# Patient Record
Sex: FEMALE | Race: WHITE | NOT HISPANIC OR LATINO
[De-identification: names, ages, dates, MRNs, and addresses within clinical notes are randomized per-mention and may not be internally consistent; named-entity substitution may affect disease eponyms.]

---

## 2018-06-21 ENCOUNTER — APPOINTMENT (OUTPATIENT)
Dept: GASTROENTEROLOGY | Facility: CLINIC | Age: 63
End: 2018-06-21
Payer: COMMERCIAL

## 2018-06-21 VITALS
BODY MASS INDEX: 35.56 KG/M2 | TEMPERATURE: 98 F | RESPIRATION RATE: 15 BRPM | WEIGHT: 216 LBS | HEIGHT: 65.2 IN | SYSTOLIC BLOOD PRESSURE: 120 MMHG | OXYGEN SATURATION: 98 % | DIASTOLIC BLOOD PRESSURE: 72 MMHG | HEART RATE: 85 BPM

## 2018-06-21 DIAGNOSIS — Z87.891 PERSONAL HISTORY OF NICOTINE DEPENDENCE: ICD-10-CM

## 2018-06-21 DIAGNOSIS — Z83.49 FAMILY HISTORY OF OTHER ENDOCRINE, NUTRITIONAL AND METABOLIC DISEASES: ICD-10-CM

## 2018-06-21 PROCEDURE — 99205 OFFICE O/P NEW HI 60 MIN: CPT

## 2018-06-22 ENCOUNTER — RX RENEWAL (OUTPATIENT)
Age: 63
End: 2018-06-22

## 2018-06-25 ENCOUNTER — RX RENEWAL (OUTPATIENT)
Age: 63
End: 2018-06-25

## 2018-07-03 LAB
ALBUMIN SERPL ELPH-MCNC: 3.3 G/DL
ALP BLD-CCNC: 282 U/L
ALT SERPL-CCNC: 55 U/L
ANION GAP SERPL CALC-SCNC: 15 MMOL/L
AST SERPL-CCNC: 64 U/L
BASOPHILS # BLD AUTO: 0.03 K/UL
BASOPHILS NFR BLD AUTO: 0.2 %
BILIRUB SERPL-MCNC: 1 MG/DL
BUN SERPL-MCNC: 17 MG/DL
CALCIUM SERPL-MCNC: 9.9 MG/DL
CHLORIDE SERPL-SCNC: 102 MMOL/L
CO2 SERPL-SCNC: 24 MMOL/L
CREAT SERPL-MCNC: 0.81 MG/DL
EOSINOPHIL # BLD AUTO: 0.27 K/UL
EOSINOPHIL NFR BLD AUTO: 1.9 %
HCT VFR BLD CALC: 44.8 %
HGB BLD-MCNC: 13.8 G/DL
IMM GRANULOCYTES NFR BLD AUTO: 0.3 %
INR PPP: 1.16 RATIO
LYMPHOCYTES # BLD AUTO: 4.26 K/UL
LYMPHOCYTES NFR BLD AUTO: 30 %
MAN DIFF?: NORMAL
MCHC RBC-ENTMCNC: 29.6 PG
MCHC RBC-ENTMCNC: 30.8 GM/DL
MCV RBC AUTO: 95.9 FL
MONOCYTES # BLD AUTO: 1.15 K/UL
MONOCYTES NFR BLD AUTO: 8.1 %
NEUTROPHILS # BLD AUTO: 8.46 K/UL
NEUTROPHILS NFR BLD AUTO: 59.5 %
PLATELET # BLD AUTO: 293 K/UL
POTASSIUM SERPL-SCNC: 4.9 MMOL/L
PROT SERPL-MCNC: 7.1 G/DL
PT BLD: 13.2 SEC
RBC # BLD: 4.67 M/UL
RBC # FLD: 15.8 %
SODIUM SERPL-SCNC: 141 MMOL/L
TACROLIMUS SERPL-MCNC: 9.3 NG/ML
TSH SERPL-ACNC: 0.32 UIU/ML
TTG IGA SER IA-ACNC: 6.7 UNITS
TTG IGA SER-ACNC: NEGATIVE
TTG IGG SER IA-ACNC: <5 UNITS
TTG IGG SER IA-ACNC: NEGATIVE
WBC # FLD AUTO: 14.21 K/UL

## 2018-07-27 ENCOUNTER — OUTPATIENT (OUTPATIENT)
Dept: OUTPATIENT SERVICES | Facility: HOSPITAL | Age: 63
LOS: 1 days | End: 2018-07-27
Payer: COMMERCIAL

## 2018-07-27 VITALS
HEART RATE: 101 BPM | RESPIRATION RATE: 16 BRPM | OXYGEN SATURATION: 99 % | TEMPERATURE: 99 F | SYSTOLIC BLOOD PRESSURE: 140 MMHG | WEIGHT: 214.95 LBS | HEIGHT: 62 IN | DIASTOLIC BLOOD PRESSURE: 80 MMHG

## 2018-07-27 DIAGNOSIS — G47.33 OBSTRUCTIVE SLEEP APNEA (ADULT) (PEDIATRIC): ICD-10-CM

## 2018-07-27 DIAGNOSIS — Z98.84 BARIATRIC SURGERY STATUS: Chronic | ICD-10-CM

## 2018-07-27 DIAGNOSIS — Z94.4 LIVER TRANSPLANT STATUS: Chronic | ICD-10-CM

## 2018-07-27 DIAGNOSIS — R60.9 EDEMA, UNSPECIFIED: ICD-10-CM

## 2018-07-27 DIAGNOSIS — I10 ESSENTIAL (PRIMARY) HYPERTENSION: ICD-10-CM

## 2018-07-27 DIAGNOSIS — Z95.828 PRESENCE OF OTHER VASCULAR IMPLANTS AND GRAFTS: Chronic | ICD-10-CM

## 2018-07-27 DIAGNOSIS — I48.91 UNSPECIFIED ATRIAL FIBRILLATION: ICD-10-CM

## 2018-07-27 DIAGNOSIS — K21.9 GASTRO-ESOPHAGEAL REFLUX DISEASE WITHOUT ESOPHAGITIS: ICD-10-CM

## 2018-07-27 DIAGNOSIS — Z94.4 LIVER TRANSPLANT STATUS: ICD-10-CM

## 2018-07-27 DIAGNOSIS — M11.20 OTHER CHONDROCALCINOSIS, UNSPECIFIED SITE: Chronic | ICD-10-CM

## 2018-07-27 DIAGNOSIS — Z98.890 OTHER SPECIFIED POSTPROCEDURAL STATES: Chronic | ICD-10-CM

## 2018-07-27 LAB
ALBUMIN SERPL ELPH-MCNC: 3.4 G/DL — SIGNIFICANT CHANGE UP (ref 3.3–5)
ALP SERPL-CCNC: 395 U/L — HIGH (ref 40–120)
ALT FLD-CCNC: 63 U/L — HIGH (ref 4–33)
AST SERPL-CCNC: 91 U/L — HIGH (ref 4–32)
BILIRUB SERPL-MCNC: 0.8 MG/DL — SIGNIFICANT CHANGE UP (ref 0.2–1.2)
BUN SERPL-MCNC: 17 MG/DL — SIGNIFICANT CHANGE UP (ref 7–23)
CALCIUM SERPL-MCNC: 9.6 MG/DL — SIGNIFICANT CHANGE UP (ref 8.4–10.5)
CHLORIDE SERPL-SCNC: 100 MMOL/L — SIGNIFICANT CHANGE UP (ref 98–107)
CO2 SERPL-SCNC: 24 MMOL/L — SIGNIFICANT CHANGE UP (ref 22–31)
CREAT SERPL-MCNC: 0.89 MG/DL — SIGNIFICANT CHANGE UP (ref 0.5–1.3)
GLUCOSE SERPL-MCNC: 111 MG/DL — HIGH (ref 70–99)
HCG SERPL-ACNC: 16.27 MIU/ML — SIGNIFICANT CHANGE UP
HCT VFR BLD CALC: 44 % — SIGNIFICANT CHANGE UP (ref 34.5–45)
HGB BLD-MCNC: 14.2 G/DL — SIGNIFICANT CHANGE UP (ref 11.5–15.5)
MCHC RBC-ENTMCNC: 30.3 PG — SIGNIFICANT CHANGE UP (ref 27–34)
MCHC RBC-ENTMCNC: 32.3 % — SIGNIFICANT CHANGE UP (ref 32–36)
MCV RBC AUTO: 93.8 FL — SIGNIFICANT CHANGE UP (ref 80–100)
NRBC # FLD: 0 — SIGNIFICANT CHANGE UP
PLATELET # BLD AUTO: 297 K/UL — SIGNIFICANT CHANGE UP (ref 150–400)
PMV BLD: 10.6 FL — SIGNIFICANT CHANGE UP (ref 7–13)
POTASSIUM SERPL-MCNC: 3.4 MMOL/L — LOW (ref 3.5–5.3)
POTASSIUM SERPL-SCNC: 3.4 MMOL/L — LOW (ref 3.5–5.3)
PROT SERPL-MCNC: 7.3 G/DL — SIGNIFICANT CHANGE UP (ref 6–8.3)
RBC # BLD: 4.69 M/UL — SIGNIFICANT CHANGE UP (ref 3.8–5.2)
RBC # FLD: 14 % — SIGNIFICANT CHANGE UP (ref 10.3–14.5)
SODIUM SERPL-SCNC: 138 MMOL/L — SIGNIFICANT CHANGE UP (ref 135–145)
WBC # BLD: 14.34 K/UL — HIGH (ref 3.8–10.5)
WBC # FLD AUTO: 14.34 K/UL — HIGH (ref 3.8–10.5)

## 2018-07-27 PROCEDURE — 93010 ELECTROCARDIOGRAM REPORT: CPT

## 2018-07-27 NOTE — H&P PST ADULT - NEGATIVE GASTROINTESTINAL SYMPTOMS
no change in bowel habits/no hematochezia/no hiccoughs/no melena/no jaundice/no nausea/no steatorrhea/no diarrhea/no flatulence/no vomiting/no constipation/no abdominal pain

## 2018-07-27 NOTE — H&P PST ADULT - FEMALE-SPECIFIC SYMPTOMS
irregular menses/LMP 1 month ago spotting/LMP 1 month ago, spotting between period stated/irregular menses

## 2018-07-27 NOTE — H&P PST ADULT - NEGATIVE FEMALE-SPECIFIC SYMPTOMS
no vaginal discharge/no menorrhagia/no abnormal vaginal bleeding/no spotting no vaginal discharge/no menorrhagia/no abnormal vaginal bleeding

## 2018-07-27 NOTE — H&P PST ADULT - NSANTHOSAYNRD_GEN_A_CORE
never tested/No. HA screening performed.  STOP BANG Legend: 0-2 = LOW Risk; 3-4 = INTERMEDIATE Risk; 5-8 = HIGH Risk

## 2018-07-27 NOTE — H&P PST ADULT - PROBLEM SELECTOR PLAN 1
Pt is scheduled for a colonoscopy, endoscopy anesthesia on 8/3/18. Preop instructions provided including NPO status, no NSAIDs or otc meds, continue ppi for gi prophylaxis. verbalized understanding.

## 2018-07-27 NOTE — H&P PST ADULT - NEGATIVE CARDIOVASCULAR SYMPTOMS
no palpitations/no dyspnea on exertion/no orthopnea/no chest pain/no claudication/no paroxysmal nocturnal dyspnea

## 2018-07-27 NOTE — H&P PST ADULT - NEGATIVE PSYCHIATRIC SYMPTOMS
no paranoia/no auditory hallucinations/no memory loss/no agitation/no visual hallucinations/no hyperactivity/no meds, sad about divorce./no mood swings/no suicidal ideation

## 2018-07-27 NOTE — H&P PST ADULT - PSH
[place] filter in place    H/O laparoscopic adjustable gastric banding  10 years ago  H/O prior ablation treatment  1/2018  Pseudogout  left shoulder and elbow  S/P liver transplant  9/2007

## 2018-07-27 NOTE — H&P PST ADULT - PMH
Afib    Autoimmune hepatitis    DVT (deep venous thrombosis)  LLE  Edema    GERD (gastroesophageal reflux disease)    HLD (hyperlipidemia)    HTN (hypertension)    Liver disease    Skin callus  SCC

## 2018-07-27 NOTE — H&P PST ADULT - PROBLEM SELECTOR PLAN 2
Pt on Eliquis , EP specialist Dr Byrd instructed to stop on 7/31/18, document in chart.  Dr Draper stated no other evaluation necessary.

## 2018-07-27 NOTE — H&P PST ADULT - NEGATIVE NEUROLOGICAL SYMPTOMS
no paresthesias/no syncope/no headache/no transient paralysis/no tremors/no vertigo/no loss of sensation/no facial palsy/no generalized seizures/no weakness/no confusion/no focal seizures/no loss of consciousness/no hemiparesis

## 2018-07-27 NOTE — H&P PST ADULT - NEGATIVE ALLERGY TYPES
no outdoor environmental allergies/no indoor environmental allergies/no reactions to animals/no reactions to insect bites/no reactions to medicines/no reactions to food

## 2018-07-27 NOTE — H&P PST ADULT - NEGATIVE GENERAL GENITOURINARY SYMPTOMS
no hematuria/no flank pain R/no bladder infections/normal libido/no flank pain L/no urine discoloration/no incontinence/no nocturia/no renal colic/no dysuria/no urinary hesitancy/no gas in urine

## 2018-07-27 NOTE — H&P PST ADULT - RS GEN PE MLT RESP DETAILS PC
no rhonchi/no wheezes/clear to auscultation bilaterally/no rales/airway patent/breath sounds equal/respirations non-labored/no chest wall tenderness/no subcutaneous emphysema/good air movement/no intercostal retractions

## 2018-07-27 NOTE — H&P PST ADULT - NEGATIVE BREAST SYMPTOMS
no nipple discharge L/no breast lump L/no breast tenderness L/no breast lump R/no nipple discharge R

## 2018-07-27 NOTE — H&P PST ADULT - HEALTH CARE MAINTENANCE
colonoscopy many years ago   sees ep specialist: every 3-6 months  sees pcp q 6 months and PE q year

## 2018-07-27 NOTE — H&P PST ADULT - NEGATIVE MUSCULOSKELETAL SYMPTOMS
no muscle cramps/no muscle weakness/no neck pain/no leg pain L/no joint swelling/no myalgia/no stiffness/no arm pain L/no arm pain R/no leg pain R

## 2018-07-27 NOTE — H&P PST ADULT - HISTORY OF PRESENT ILLNESS
64y/o female w/ a pmh of Afib htn, hld, liver disease S/P Liver transplant , GERD and edema. Presents to PST with a preop dx of liver transplant status ad to be evaluated for a scheduled colonoscopy endoscopy anesthesia on 8/3/18. Pt states planned procedures to be routine since liver transplant, will also check bands and if needed to be re adjusted will be perform. Denies any complaints at this time.

## 2018-07-27 NOTE — H&P PST ADULT - NEGATIVE GENERAL SYMPTOMS
no anorexia/no malaise/no weight loss/no polyphagia/no fever/no fatigue/no chills/no weight gain/no polyuria/no polydipsia

## 2018-08-01 DIAGNOSIS — Z94.4 LIVER TRANSPLANT STATUS: ICD-10-CM

## 2018-08-03 ENCOUNTER — APPOINTMENT (OUTPATIENT)
Dept: GASTROENTEROLOGY | Facility: HOSPITAL | Age: 63
End: 2018-08-03
Payer: COMMERCIAL

## 2018-08-03 ENCOUNTER — RESULT REVIEW (OUTPATIENT)
Age: 63
End: 2018-08-03

## 2018-08-03 ENCOUNTER — OUTPATIENT (OUTPATIENT)
Dept: OUTPATIENT SERVICES | Facility: HOSPITAL | Age: 63
LOS: 1 days | Discharge: HOME | End: 2018-08-03
Payer: COMMERCIAL

## 2018-08-03 DIAGNOSIS — Z95.828 PRESENCE OF OTHER VASCULAR IMPLANTS AND GRAFTS: Chronic | ICD-10-CM

## 2018-08-03 DIAGNOSIS — Z94.4 LIVER TRANSPLANT STATUS: ICD-10-CM

## 2018-08-03 DIAGNOSIS — M11.20 OTHER CHONDROCALCINOSIS, UNSPECIFIED SITE: Chronic | ICD-10-CM

## 2018-08-03 DIAGNOSIS — Z98.84 BARIATRIC SURGERY STATUS: Chronic | ICD-10-CM

## 2018-08-03 DIAGNOSIS — Z98.890 OTHER SPECIFIED POSTPROCEDURAL STATES: Chronic | ICD-10-CM

## 2018-08-03 DIAGNOSIS — Z94.4 LIVER TRANSPLANT STATUS: Chronic | ICD-10-CM

## 2018-08-03 PROCEDURE — 88305 TISSUE EXAM BY PATHOLOGIST: CPT | Mod: 26

## 2018-08-03 PROCEDURE — 45380 COLONOSCOPY AND BIOPSY: CPT | Mod: 59

## 2018-08-03 PROCEDURE — 45385 COLONOSCOPY W/LESION REMOVAL: CPT

## 2018-08-03 PROCEDURE — 43251 EGD REMOVE LESION SNARE: CPT

## 2018-08-09 LAB — SURGICAL PATHOLOGY STUDY: SIGNIFICANT CHANGE UP

## 2018-08-30 ENCOUNTER — CLINICAL ADVICE (OUTPATIENT)
Age: 63
End: 2018-08-30

## 2018-09-10 PROBLEM — I10 ESSENTIAL (PRIMARY) HYPERTENSION: Chronic | Status: ACTIVE | Noted: 2018-07-27

## 2018-09-10 PROBLEM — I82.409 ACUTE EMBOLISM AND THROMBOSIS OF UNSPECIFIED DEEP VEINS OF UNSPECIFIED LOWER EXTREMITY: Chronic | Status: ACTIVE | Noted: 2018-07-27

## 2018-09-10 PROBLEM — I48.91 UNSPECIFIED ATRIAL FIBRILLATION: Chronic | Status: ACTIVE | Noted: 2018-07-27

## 2018-09-20 ENCOUNTER — CLINICAL ADVICE (OUTPATIENT)
Age: 63
End: 2018-09-20

## 2018-09-27 ENCOUNTER — APPOINTMENT (OUTPATIENT)
Dept: HEPATOLOGY | Facility: CLINIC | Age: 63
End: 2018-09-27

## 2018-10-04 ENCOUNTER — APPOINTMENT (OUTPATIENT)
Dept: HEPATOLOGY | Facility: CLINIC | Age: 63
End: 2018-10-04
Payer: COMMERCIAL

## 2018-10-04 ENCOUNTER — LABORATORY RESULT (OUTPATIENT)
Age: 63
End: 2018-10-04

## 2018-10-04 VITALS
WEIGHT: 222.38 LBS | BODY MASS INDEX: 36.78 KG/M2 | RESPIRATION RATE: 16 BRPM | SYSTOLIC BLOOD PRESSURE: 120 MMHG | HEART RATE: 145 BPM | DIASTOLIC BLOOD PRESSURE: 84 MMHG | TEMPERATURE: 98.1 F

## 2018-10-04 DIAGNOSIS — Z87.19 PERSONAL HISTORY OF OTHER DISEASES OF THE DIGESTIVE SYSTEM: ICD-10-CM

## 2018-10-04 PROCEDURE — 99204 OFFICE O/P NEW MOD 45 MIN: CPT

## 2018-10-04 PROCEDURE — 99215 OFFICE O/P EST HI 40 MIN: CPT

## 2018-10-04 RX ORDER — OXYCODONE 10 MG/1
10 TABLET ORAL
Qty: 30 | Refills: 0 | Status: DISCONTINUED | COMMUNITY
Start: 2018-03-16 | End: 2018-10-04

## 2018-10-04 RX ORDER — COLCHICINE 0.6 MG/1
0.6 TABLET ORAL
Qty: 30 | Refills: 0 | Status: DISCONTINUED | COMMUNITY
Start: 2018-01-17 | End: 2018-10-04

## 2018-10-04 RX ORDER — SODIUM SULFATE, POTASSIUM SULFATE, MAGNESIUM SULFATE 17.5; 3.13; 1.6 G/ML; G/ML; G/ML
17.5-3.13-1.6 SOLUTION, CONCENTRATE ORAL
Qty: 1 | Refills: 0 | Status: DISCONTINUED | COMMUNITY
Start: 2018-06-21 | End: 2018-10-04

## 2018-10-04 RX ORDER — METOPROLOL SUCCINATE 100 MG/1
100 TABLET, EXTENDED RELEASE ORAL
Qty: 60 | Refills: 0 | Status: DISCONTINUED | COMMUNITY
Start: 2018-02-11 | End: 2018-10-04

## 2018-10-04 RX ORDER — CEPHALEXIN 500 MG/1
500 CAPSULE ORAL
Qty: 14 | Refills: 0 | Status: DISCONTINUED | COMMUNITY
Start: 2018-03-23 | End: 2018-10-04

## 2018-10-04 RX ORDER — ONDANSETRON 4 MG/1
4 TABLET, ORALLY DISINTEGRATING ORAL
Qty: 15 | Refills: 0 | Status: DISCONTINUED | COMMUNITY
Start: 2018-03-23 | End: 2018-10-04

## 2018-10-04 RX ORDER — OXYCODONE 5 MG/1
5 TABLET ORAL
Qty: 12 | Refills: 0 | Status: DISCONTINUED | COMMUNITY
Start: 2018-03-23 | End: 2018-10-04

## 2018-10-04 RX ORDER — ENOXAPARIN SODIUM 150 MG/ML
150 INJECTION SUBCUTANEOUS
Qty: 1 | Refills: 0 | Status: DISCONTINUED | COMMUNITY
Start: 2018-01-12 | End: 2018-10-04

## 2018-10-04 RX ORDER — BACLOFEN 20 MG/1
20 TABLET ORAL
Qty: 30 | Refills: 0 | Status: DISCONTINUED | COMMUNITY
Start: 2018-03-16 | End: 2018-10-04

## 2018-10-05 LAB
AFP-TM SERPL-MCNC: 2.7 NG/ML
ALBUMIN SERPL ELPH-MCNC: 3.4 G/DL
ALP BLD-CCNC: 378 U/L
ALT SERPL-CCNC: 78 U/L
ANION GAP SERPL CALC-SCNC: 13 MMOL/L
AST SERPL-CCNC: 82 U/L
BASOPHILS # BLD AUTO: 0.03 K/UL
BASOPHILS NFR BLD AUTO: 0.2 %
BILIRUB SERPL-MCNC: 0.9 MG/DL
BUN SERPL-MCNC: 16 MG/DL
CALCIUM SERPL-MCNC: 9.7 MG/DL
CHLORIDE SERPL-SCNC: 101 MMOL/L
CO2 SERPL-SCNC: 25 MMOL/L
CREAT SERPL-MCNC: 0.71 MG/DL
EBV DNA SERPL NAA+PROBE-ACNC: NOT DETECTED IU/ML
EBVPCR LOG: NOT DETECTED LOGIU/ML
EOSINOPHIL # BLD AUTO: 0.12 K/UL
EOSINOPHIL NFR BLD AUTO: 0.9 %
FERRITIN SERPL-MCNC: 145 NG/ML
GLUCOSE SERPL-MCNC: 131 MG/DL
HBV CORE IGG+IGM SER QL: NONREACTIVE
HBV SURFACE AB SER QL: NONREACTIVE
HBV SURFACE AG SER QL: NONREACTIVE
HCT VFR BLD CALC: 46.1 %
HCV AB SER QL: NONREACTIVE
HCV S/CO RATIO: 0.58 S/CO
HGB BLD-MCNC: 14.2 G/DL
IMM GRANULOCYTES NFR BLD AUTO: 0.2 %
IRON SATN MFR SERPL: 30 %
IRON SERPL-MCNC: 82 UG/DL
LYMPHOCYTES # BLD AUTO: 3.34 K/UL
LYMPHOCYTES NFR BLD AUTO: 24.5 %
MAN DIFF?: NORMAL
MCHC RBC-ENTMCNC: 29.6 PG
MCHC RBC-ENTMCNC: 30.8 GM/DL
MCV RBC AUTO: 96.2 FL
MONOCYTES # BLD AUTO: 0.89 K/UL
MONOCYTES NFR BLD AUTO: 6.5 %
MPO AB + PR3 PNL SER: NORMAL
NEUTROPHILS # BLD AUTO: 9.23 K/UL
NEUTROPHILS NFR BLD AUTO: 67.7 %
PLATELET # BLD AUTO: 315 K/UL
POTASSIUM SERPL-SCNC: 3.7 MMOL/L
PROT SERPL-MCNC: 7.2 G/DL
RBC # BLD: 4.79 M/UL
RBC # FLD: 14.7 %
SODIUM SERPL-SCNC: 139 MMOL/L
TACROLIMUS SERPL-MCNC: <2 NG/ML
TIBC SERPL-MCNC: 277 UG/DL
TTG IGA SER IA-ACNC: 6.1 UNITS
TTG IGA SER-ACNC: NEGATIVE
UIBC SERPL-MCNC: 195 UG/DL
WBC # FLD AUTO: 13.64 K/UL

## 2018-10-08 ENCOUNTER — RESULT REVIEW (OUTPATIENT)
Age: 63
End: 2018-10-08

## 2018-10-08 ENCOUNTER — MOBILE ON CALL (OUTPATIENT)
Age: 63
End: 2018-10-08

## 2018-10-09 LAB
A1AT SERPL-MCNC: 178 MG/DL
CMV DNA SPEC QL NAA+PROBE: NOT DETECTED IU/ML
CMVPCR LOG: NOT DETECTED LOGIU/ML
DEPRECATED KAPPA LC FREE/LAMBDA SER: 1.46 RATIO
HBV DNA # SERPL NAA+PROBE: NOT DETECTED
HEPB DNA PCR LOG: NOT DETECTED LOGIU/ML
HERPES SIMPLEX 1 DNA: NOT DETECTED
HERPES SIMPLEX 2 DNA: NOT DETECTED
HERPES SIMPLEX SPECIMEN SOURCE: NORMAL
IGA SER QL IEP: 342 MG/DL
IGG SER QL IEP: 1770 MG/DL
IGM SER QL IEP: 94 MG/DL
KAPPA LC CSF-MCNC: 3.36 MG/DL
KAPPA LC SERPL-MCNC: 4.9 MG/DL
LKM AB SER QL IF: 1.5 UNITS
MITOCHONDRIA AB SER IF-ACNC: NORMAL
SMOOTH MUSCLE AB SER QL IF: NORMAL

## 2018-10-10 LAB — HEV AB SER QL: POSITIVE

## 2018-10-11 LAB — HEPATITIS E IGM ABY: NORMAL

## 2018-10-12 LAB
ANA PAT FLD IF-IMP: ABNORMAL
ANA SER IF-ACNC: ABNORMAL
SOLUBLE LIVER IGG SER IA-ACNC: < 20.1 UNITS

## 2018-10-15 ENCOUNTER — FORM ENCOUNTER (OUTPATIENT)
Age: 63
End: 2018-10-15

## 2018-10-15 ENCOUNTER — OTHER (OUTPATIENT)
Age: 63
End: 2018-10-15

## 2018-10-16 ENCOUNTER — OUTPATIENT (OUTPATIENT)
Dept: OUTPATIENT SERVICES | Facility: HOSPITAL | Age: 63
LOS: 1 days | End: 2018-10-16
Payer: COMMERCIAL

## 2018-10-16 ENCOUNTER — APPOINTMENT (OUTPATIENT)
Dept: ULTRASOUND IMAGING | Facility: CLINIC | Age: 63
End: 2018-10-16
Payer: COMMERCIAL

## 2018-10-16 DIAGNOSIS — Z95.828 PRESENCE OF OTHER VASCULAR IMPLANTS AND GRAFTS: Chronic | ICD-10-CM

## 2018-10-16 DIAGNOSIS — Z98.84 BARIATRIC SURGERY STATUS: Chronic | ICD-10-CM

## 2018-10-16 DIAGNOSIS — Z94.4 LIVER TRANSPLANT STATUS: Chronic | ICD-10-CM

## 2018-10-16 DIAGNOSIS — Z00.8 ENCOUNTER FOR OTHER GENERAL EXAMINATION: ICD-10-CM

## 2018-10-16 DIAGNOSIS — M11.20 OTHER CHONDROCALCINOSIS, UNSPECIFIED SITE: Chronic | ICD-10-CM

## 2018-10-16 DIAGNOSIS — Z98.890 OTHER SPECIFIED POSTPROCEDURAL STATES: Chronic | ICD-10-CM

## 2018-10-16 PROCEDURE — 93975 VASCULAR STUDY: CPT | Mod: 26

## 2018-10-16 PROCEDURE — 93975 VASCULAR STUDY: CPT

## 2018-11-02 LAB
APTT BLD: 31.2 SEC
INR PPP: 1.22 RATIO
PT BLD: 13.8 SEC

## 2018-11-06 ENCOUNTER — APPOINTMENT (OUTPATIENT)
Dept: ULTRASOUND IMAGING | Facility: HOSPITAL | Age: 63
End: 2018-11-06
Payer: COMMERCIAL

## 2018-11-06 ENCOUNTER — OUTPATIENT (OUTPATIENT)
Dept: OUTPATIENT SERVICES | Facility: HOSPITAL | Age: 63
LOS: 1 days | End: 2018-11-06
Payer: COMMERCIAL

## 2018-11-06 ENCOUNTER — RESULT REVIEW (OUTPATIENT)
Age: 63
End: 2018-11-06

## 2018-11-06 DIAGNOSIS — Z95.828 PRESENCE OF OTHER VASCULAR IMPLANTS AND GRAFTS: Chronic | ICD-10-CM

## 2018-11-06 DIAGNOSIS — M11.20 OTHER CHONDROCALCINOSIS, UNSPECIFIED SITE: Chronic | ICD-10-CM

## 2018-11-06 DIAGNOSIS — Z98.84 BARIATRIC SURGERY STATUS: Chronic | ICD-10-CM

## 2018-11-06 DIAGNOSIS — Z94.4 LIVER TRANSPLANT STATUS: Chronic | ICD-10-CM

## 2018-11-06 DIAGNOSIS — R94.5 ABNORMAL RESULTS OF LIVER FUNCTION STUDIES: ICD-10-CM

## 2018-11-06 DIAGNOSIS — Z98.890 OTHER SPECIFIED POSTPROCEDURAL STATES: Chronic | ICD-10-CM

## 2018-11-06 PROCEDURE — 47000 NEEDLE BIOPSY OF LIVER PERQ: CPT

## 2018-11-06 PROCEDURE — 76942 ECHO GUIDE FOR BIOPSY: CPT

## 2018-11-06 PROCEDURE — 76942 ECHO GUIDE FOR BIOPSY: CPT | Mod: 26

## 2018-11-08 ENCOUNTER — MEDICATION RENEWAL (OUTPATIENT)
Age: 63
End: 2018-11-08

## 2018-11-08 RX ORDER — TACROLIMUS 1 MG/1
1 CAPSULE ORAL
Qty: 180 | Refills: 2 | Status: DISCONTINUED | COMMUNITY
Start: 2018-11-07 | End: 2018-11-08

## 2018-11-08 RX ORDER — TACROLIMUS 5 MG/1
5 CAPSULE ORAL TWICE DAILY
Qty: 60 | Refills: 0 | Status: DISCONTINUED | COMMUNITY
Start: 2018-02-07 | End: 2018-11-08

## 2018-11-08 RX ORDER — TACROLIMUS 5 MG/1
5 CAPSULE ORAL
Qty: 180 | Refills: 2 | Status: DISCONTINUED | COMMUNITY
Start: 2018-11-07 | End: 2018-11-08

## 2018-11-16 ENCOUNTER — APPOINTMENT (OUTPATIENT)
Dept: HEPATOLOGY | Facility: CLINIC | Age: 63
End: 2018-11-16
Payer: COMMERCIAL

## 2018-11-16 VITALS
RESPIRATION RATE: 14 BRPM | WEIGHT: 229 LBS | TEMPERATURE: 97.8 F | DIASTOLIC BLOOD PRESSURE: 94 MMHG | BODY MASS INDEX: 38.15 KG/M2 | HEIGHT: 65 IN | HEART RATE: 59 BPM | SYSTOLIC BLOOD PRESSURE: 148 MMHG

## 2018-11-16 DIAGNOSIS — Z85.828 PERSONAL HISTORY OF OTHER MALIGNANT NEOPLASM OF SKIN: ICD-10-CM

## 2018-11-16 DIAGNOSIS — M11.20 OTHER CHONDROCALCINOSIS, UNSPECIFIED SITE: ICD-10-CM

## 2018-11-16 PROCEDURE — 99214 OFFICE O/P EST MOD 30 MIN: CPT

## 2018-11-19 LAB
ALBUMIN SERPL ELPH-MCNC: 3.7 G/DL
ALP BLD-CCNC: 271 U/L
ALT SERPL-CCNC: 32 U/L
ANION GAP SERPL CALC-SCNC: 13 MMOL/L
AST SERPL-CCNC: 37 U/L
BASOPHILS # BLD AUTO: 0.02 K/UL
BASOPHILS NFR BLD AUTO: 0.1 %
BILIRUB SERPL-MCNC: 0.9 MG/DL
BUN SERPL-MCNC: 14 MG/DL
CALCIUM SERPL-MCNC: 9.7 MG/DL
CHLORIDE SERPL-SCNC: 103 MMOL/L
CO2 SERPL-SCNC: 24 MMOL/L
CREAT SERPL-MCNC: 0.63 MG/DL
EOSINOPHIL # BLD AUTO: 0.17 K/UL
EOSINOPHIL NFR BLD AUTO: 1.2 %
GGT SERPL-CCNC: 315 U/L
GLUCOSE SERPL-MCNC: 90 MG/DL
HCT VFR BLD CALC: 44.5 %
HGB BLD-MCNC: 14 G/DL
IGG SER QL IEP: 1583 MG/DL
IMM GRANULOCYTES NFR BLD AUTO: 0.4 %
LYMPHOCYTES # BLD AUTO: 3.82 K/UL
LYMPHOCYTES NFR BLD AUTO: 27.4 %
MAN DIFF?: NORMAL
MCHC RBC-ENTMCNC: 30.2 PG
MCHC RBC-ENTMCNC: 31.5 GM/DL
MCV RBC AUTO: 96.1 FL
MONOCYTES # BLD AUTO: 1.16 K/UL
MONOCYTES NFR BLD AUTO: 8.3 %
NEUTROPHILS # BLD AUTO: 8.72 K/UL
NEUTROPHILS NFR BLD AUTO: 62.6 %
PLATELET # BLD AUTO: 314 K/UL
POTASSIUM SERPL-SCNC: 4.2 MMOL/L
PROT SERPL-MCNC: 7.2 G/DL
RBC # BLD: 4.63 M/UL
RBC # FLD: 14.4 %
SODIUM SERPL-SCNC: 140 MMOL/L
TACROLIMUS SERPL-MCNC: 14.8 NG/ML
WBC # FLD AUTO: 13.94 K/UL

## 2018-11-19 RX ORDER — TACROLIMUS 1 MG/1
1 CAPSULE ORAL TWICE DAILY
Qty: 180 | Refills: 1 | Status: DISCONTINUED | COMMUNITY
Start: 2018-11-08 | End: 2018-11-19

## 2018-11-27 ENCOUNTER — CLINICAL ADVICE (OUTPATIENT)
Age: 63
End: 2018-11-27

## 2018-12-03 ENCOUNTER — CLINICAL ADVICE (OUTPATIENT)
Age: 63
End: 2018-12-03

## 2018-12-03 ENCOUNTER — RX RENEWAL (OUTPATIENT)
Age: 63
End: 2018-12-03

## 2018-12-06 LAB
ALBUMIN SERPL ELPH-MCNC: 3.7 G/DL
ALP BLD-CCNC: 335 U/L
ALT SERPL-CCNC: 42 U/L
ANION GAP SERPL CALC-SCNC: 11 MMOL/L
AST SERPL-CCNC: 47 U/L
BASOPHILS # BLD AUTO: 0.03 K/UL
BASOPHILS NFR BLD AUTO: 0.2 %
BILIRUB SERPL-MCNC: 1.2 MG/DL
BUN SERPL-MCNC: 11 MG/DL
CALCIUM SERPL-MCNC: 9.3 MG/DL
CHLORIDE SERPL-SCNC: 103 MMOL/L
CO2 SERPL-SCNC: 24 MMOL/L
CREAT SERPL-MCNC: 0.61 MG/DL
EOSINOPHIL # BLD AUTO: 0.14 K/UL
EOSINOPHIL NFR BLD AUTO: 0.8 %
GGT SERPL-CCNC: 339 U/L
GLUCOSE SERPL-MCNC: 101 MG/DL
HCT VFR BLD CALC: 45.2 %
HGB BLD-MCNC: 14.2 G/DL
IMM GRANULOCYTES NFR BLD AUTO: 0.4 %
LYMPHOCYTES # BLD AUTO: 4.54 K/UL
LYMPHOCYTES NFR BLD AUTO: 25.4 %
MAN DIFF?: NORMAL
MCHC RBC-ENTMCNC: 29.6 PG
MCHC RBC-ENTMCNC: 31.4 GM/DL
MCV RBC AUTO: 94.2 FL
MONOCYTES # BLD AUTO: 1.69 K/UL
MONOCYTES NFR BLD AUTO: 9.5 %
NEUTROPHILS # BLD AUTO: 11.41 K/UL
NEUTROPHILS NFR BLD AUTO: 63.7 %
PLATELET # BLD AUTO: 318 K/UL
POTASSIUM SERPL-SCNC: 4.4 MMOL/L
PROT SERPL-MCNC: 6.8 G/DL
RBC # BLD: 4.8 M/UL
RBC # FLD: 14.1 %
SODIUM SERPL-SCNC: 138 MMOL/L
TACROLIMUS SERPL-MCNC: 11.7 NG/ML
WBC # FLD AUTO: 17.88 K/UL

## 2018-12-07 ENCOUNTER — APPOINTMENT (OUTPATIENT)
Dept: GASTROENTEROLOGY | Facility: HOSPITAL | Age: 63
End: 2018-12-07

## 2018-12-07 ENCOUNTER — OUTPATIENT (OUTPATIENT)
Dept: OUTPATIENT SERVICES | Facility: HOSPITAL | Age: 63
LOS: 1 days | Discharge: HOME | End: 2018-12-07
Payer: COMMERCIAL

## 2018-12-07 ENCOUNTER — RESULT REVIEW (OUTPATIENT)
Age: 63
End: 2018-12-07

## 2018-12-07 DIAGNOSIS — M11.20 OTHER CHONDROCALCINOSIS, UNSPECIFIED SITE: Chronic | ICD-10-CM

## 2018-12-07 DIAGNOSIS — K31.7 POLYP OF STOMACH AND DUODENUM: ICD-10-CM

## 2018-12-07 DIAGNOSIS — Z94.4 LIVER TRANSPLANT STATUS: Chronic | ICD-10-CM

## 2018-12-07 DIAGNOSIS — Z98.890 OTHER SPECIFIED POSTPROCEDURAL STATES: Chronic | ICD-10-CM

## 2018-12-07 DIAGNOSIS — Z98.84 BARIATRIC SURGERY STATUS: Chronic | ICD-10-CM

## 2018-12-07 DIAGNOSIS — Z95.828 PRESENCE OF OTHER VASCULAR IMPLANTS AND GRAFTS: Chronic | ICD-10-CM

## 2018-12-07 LAB
APPEARANCE UR: SIGNIFICANT CHANGE UP
BACTERIA # UR AUTO: HIGH
BASOPHILS # BLD AUTO: 0.03 K/UL — SIGNIFICANT CHANGE UP (ref 0–0.2)
BASOPHILS NFR BLD AUTO: 0.2 % — SIGNIFICANT CHANGE UP (ref 0–2)
BILIRUB UR-MCNC: SIGNIFICANT CHANGE UP
BLOOD UR QL VISUAL: SIGNIFICANT CHANGE UP
COD CRY URNS QL: SIGNIFICANT CHANGE UP (ref 0–0)
COLOR SPEC: YELLOW — SIGNIFICANT CHANGE UP
EOSINOPHIL # BLD AUTO: 0.07 K/UL — SIGNIFICANT CHANGE UP (ref 0–0.5)
EOSINOPHIL NFR BLD AUTO: 0.6 % — SIGNIFICANT CHANGE UP (ref 0–6)
GLUCOSE UR-MCNC: NEGATIVE — SIGNIFICANT CHANGE UP
HCT VFR BLD CALC: 45.2 % — HIGH (ref 34.5–45)
HGB BLD-MCNC: 14.5 G/DL — SIGNIFICANT CHANGE UP (ref 11.5–15.5)
IMM GRANULOCYTES # BLD AUTO: 0.05 # — SIGNIFICANT CHANGE UP
IMM GRANULOCYTES NFR BLD AUTO: 0.4 % — SIGNIFICANT CHANGE UP (ref 0–1.5)
KETONES UR-MCNC: NEGATIVE — SIGNIFICANT CHANGE UP
LEUKOCYTE ESTERASE UR-ACNC: HIGH
LYMPHOCYTES # BLD AUTO: 2.55 K/UL — SIGNIFICANT CHANGE UP (ref 1–3.3)
LYMPHOCYTES # BLD AUTO: 20 % — SIGNIFICANT CHANGE UP (ref 13–44)
MCHC RBC-ENTMCNC: 29.7 PG — SIGNIFICANT CHANGE UP (ref 27–34)
MCHC RBC-ENTMCNC: 32.1 % — SIGNIFICANT CHANGE UP (ref 32–36)
MCV RBC AUTO: 92.6 FL — SIGNIFICANT CHANGE UP (ref 80–100)
MONOCYTES # BLD AUTO: 0.82 K/UL — SIGNIFICANT CHANGE UP (ref 0–0.9)
MONOCYTES NFR BLD AUTO: 6.4 % — SIGNIFICANT CHANGE UP (ref 2–14)
MUCOUS THREADS # UR AUTO: SIGNIFICANT CHANGE UP
NEUTROPHILS # BLD AUTO: 9.2 K/UL — HIGH (ref 1.8–7.4)
NEUTROPHILS NFR BLD AUTO: 72.4 % — SIGNIFICANT CHANGE UP (ref 43–77)
NITRITE UR-MCNC: NEGATIVE — SIGNIFICANT CHANGE UP
NRBC # FLD: 0 — SIGNIFICANT CHANGE UP
PH UR: 5.5 — SIGNIFICANT CHANGE UP (ref 5–8)
PLATELET # BLD AUTO: 328 K/UL — SIGNIFICANT CHANGE UP (ref 150–400)
PMV BLD: 10.3 FL — SIGNIFICANT CHANGE UP (ref 7–13)
PROT UR-MCNC: SIGNIFICANT CHANGE UP
RBC # BLD: 4.88 M/UL — SIGNIFICANT CHANGE UP (ref 3.8–5.2)
RBC # FLD: 13.4 % — SIGNIFICANT CHANGE UP (ref 10.3–14.5)
RBC CASTS # UR COMP ASSIST: SIGNIFICANT CHANGE UP (ref 0–?)
SP GR SPEC: 1.02 — SIGNIFICANT CHANGE UP (ref 1–1.04)
SQUAMOUS # UR AUTO: SIGNIFICANT CHANGE UP
UROBILINOGEN FLD QL: NORMAL — SIGNIFICANT CHANGE UP
WBC # BLD: 12.72 K/UL — HIGH (ref 3.8–10.5)
WBC # FLD AUTO: 12.72 K/UL — HIGH (ref 3.8–10.5)
WBC UR QL: SIGNIFICANT CHANGE UP (ref 0–?)

## 2018-12-07 PROCEDURE — 43239 EGD BIOPSY SINGLE/MULTIPLE: CPT | Mod: 59,GC

## 2018-12-07 PROCEDURE — 88305 TISSUE EXAM BY PATHOLOGIST: CPT | Mod: 26

## 2018-12-07 PROCEDURE — 43251 EGD REMOVE LESION SNARE: CPT | Mod: GC

## 2018-12-08 LAB — SPECIMEN SOURCE: SIGNIFICANT CHANGE UP

## 2018-12-09 LAB
-  AMIKACIN: SIGNIFICANT CHANGE UP
-  AMPICILLIN/SULBACTAM: SIGNIFICANT CHANGE UP
-  AMPICILLIN: SIGNIFICANT CHANGE UP
-  AZTREONAM: SIGNIFICANT CHANGE UP
-  CEFAZOLIN: SIGNIFICANT CHANGE UP
-  CEFEPIME: SIGNIFICANT CHANGE UP
-  CEFOXITIN: SIGNIFICANT CHANGE UP
-  CEFTAZIDIME: SIGNIFICANT CHANGE UP
-  CEFTRIAXONE: SIGNIFICANT CHANGE UP
-  CIPROFLOXACIN: SIGNIFICANT CHANGE UP
-  ERTAPENEM: SIGNIFICANT CHANGE UP
-  GENTAMICIN: SIGNIFICANT CHANGE UP
-  IMIPENEM: SIGNIFICANT CHANGE UP
-  LEVOFLOXACIN: SIGNIFICANT CHANGE UP
-  MEROPENEM: SIGNIFICANT CHANGE UP
-  NITROFURANTOIN: SIGNIFICANT CHANGE UP
-  PIPERACILLIN/TAZOBACTAM: SIGNIFICANT CHANGE UP
-  TIGECYCLINE: SIGNIFICANT CHANGE UP
-  TOBRAMYCIN: SIGNIFICANT CHANGE UP
-  TRIMETHOPRIM/SULFAMETHOXAZOLE: SIGNIFICANT CHANGE UP
BACTERIA UR CULT: SIGNIFICANT CHANGE UP
METHOD TYPE: SIGNIFICANT CHANGE UP
ORGANISM # SPEC MICROSCOPIC CNT: SIGNIFICANT CHANGE UP
ORGANISM # SPEC MICROSCOPIC CNT: SIGNIFICANT CHANGE UP

## 2018-12-12 LAB — SURGICAL PATHOLOGY STUDY: SIGNIFICANT CHANGE UP

## 2018-12-19 ENCOUNTER — RESULT REVIEW (OUTPATIENT)
Age: 63
End: 2018-12-19

## 2018-12-24 LAB
ALBUMIN SERPL ELPH-MCNC: 3.4 G/DL
ALP BLD-CCNC: 274 U/L
ALT SERPL-CCNC: 33 U/L
ANION GAP SERPL CALC-SCNC: 9 MMOL/L
APPEARANCE: ABNORMAL
AST SERPL-CCNC: 39 U/L
BACTERIA: ABNORMAL
BASOPHILS # BLD AUTO: 0.02 K/UL
BASOPHILS NFR BLD AUTO: 0.2 %
BILIRUB SERPL-MCNC: 0.8 MG/DL
BILIRUBIN URINE: NEGATIVE
BLOOD URINE: ABNORMAL
BUN SERPL-MCNC: 15 MG/DL
CALCIUM OXALATE CRYSTALS: ABNORMAL
CALCIUM SERPL-MCNC: 9.2 MG/DL
CHLORIDE SERPL-SCNC: 105 MMOL/L
CO2 SERPL-SCNC: 28 MMOL/L
COLOR: YELLOW
CREAT SERPL-MCNC: 0.76 MG/DL
EOSINOPHIL # BLD AUTO: 0.22 K/UL
EOSINOPHIL NFR BLD AUTO: 1.7 %
GGT SERPL-CCNC: 314 U/L
GLUCOSE QUALITATIVE U: NEGATIVE MG/DL
GLUCOSE SERPL-MCNC: 97 MG/DL
HCT VFR BLD CALC: 43.9 %
HGB BLD-MCNC: 13.8 G/DL
HYALINE CASTS: 2 /LPF
IMM GRANULOCYTES NFR BLD AUTO: 0.3 %
INR PPP: 1.08 RATIO
KETONES URINE: NEGATIVE
LEUKOCYTE ESTERASE URINE: ABNORMAL
LYMPHOCYTES # BLD AUTO: 4.02 K/UL
LYMPHOCYTES NFR BLD AUTO: 31 %
MAN DIFF?: NORMAL
MCHC RBC-ENTMCNC: 29.2 PG
MCHC RBC-ENTMCNC: 31.4 GM/DL
MCV RBC AUTO: 93 FL
MICROSCOPIC-UA: NORMAL
MONOCYTES # BLD AUTO: 1 K/UL
MONOCYTES NFR BLD AUTO: 7.7 %
NEUTROPHILS # BLD AUTO: 7.68 K/UL
NEUTROPHILS NFR BLD AUTO: 59.1 %
NITRITE URINE: NEGATIVE
PH URINE: 7
PLATELET # BLD AUTO: 292 K/UL
POTASSIUM SERPL-SCNC: 4.5 MMOL/L
PROT SERPL-MCNC: 6.6 G/DL
PROTEIN URINE: NEGATIVE MG/DL
PT BLD: 12.3 SEC
RBC # BLD: 4.72 M/UL
RBC # FLD: 14.3 %
RED BLOOD CELLS URINE: 3 /HPF
SODIUM SERPL-SCNC: 142 MMOL/L
SPECIFIC GRAVITY URINE: 1.02
SQUAMOUS EPITHELIAL CELLS: 7 /HPF
TACROLIMUS SERPL-MCNC: 10.2 NG/ML
URINE COMMENTS: NORMAL
UROBILINOGEN URINE: NEGATIVE MG/DL
WBC # FLD AUTO: 12.98 K/UL
WHITE BLOOD CELLS URINE: 47 /HPF

## 2018-12-26 LAB — BACTERIA UR CULT: ABNORMAL

## 2018-12-26 RX ORDER — SULFAMETHOXAZOLE AND TRIMETHOPRIM 800; 160 MG/1; MG/1
800-160 TABLET ORAL
Qty: 14 | Refills: 0 | Status: DISCONTINUED | COMMUNITY
Start: 2018-12-24 | End: 2018-12-26

## 2019-01-18 ENCOUNTER — APPOINTMENT (OUTPATIENT)
Dept: HEPATOLOGY | Facility: CLINIC | Age: 64
End: 2019-01-18

## 2019-02-04 ENCOUNTER — RX RENEWAL (OUTPATIENT)
Age: 64
End: 2019-02-04

## 2019-02-26 LAB
ALBUMIN SERPL ELPH-MCNC: 3.5 G/DL
ALP BLD-CCNC: 272 U/L
ALT SERPL-CCNC: 33 U/L
ANION GAP SERPL CALC-SCNC: 9 MMOL/L
AST SERPL-CCNC: 32 U/L
BASOPHILS # BLD AUTO: 0.04 K/UL
BASOPHILS NFR BLD AUTO: 0.3 %
BILIRUB DIRECT SERPL-MCNC: 0.3 MG/DL
BILIRUB SERPL-MCNC: 0.8 MG/DL
BUN SERPL-MCNC: 13 MG/DL
CALCIUM SERPL-MCNC: 9.4 MG/DL
CHLORIDE SERPL-SCNC: 105 MMOL/L
CO2 SERPL-SCNC: 27 MMOL/L
CREAT SERPL-MCNC: 0.66 MG/DL
EOSINOPHIL # BLD AUTO: 0.16 K/UL
EOSINOPHIL NFR BLD AUTO: 1.1 %
GGT SERPL-CCNC: 329 U/L
GLUCOSE SERPL-MCNC: 112 MG/DL
HCT VFR BLD CALC: 43.8 %
HEPATITIS A IGG ANTIBODY: REACTIVE
HGB BLD-MCNC: 13.4 G/DL
IMM GRANULOCYTES NFR BLD AUTO: 0.3 %
INR PPP: 1.09 RATIO
LYMPHOCYTES # BLD AUTO: 3.44 K/UL
LYMPHOCYTES NFR BLD AUTO: 23.9 %
MAN DIFF?: NORMAL
MCHC RBC-ENTMCNC: 29.5 PG
MCHC RBC-ENTMCNC: 30.6 GM/DL
MCV RBC AUTO: 96.3 FL
MONOCYTES # BLD AUTO: 0.97 K/UL
MONOCYTES NFR BLD AUTO: 6.7 %
NEUTROPHILS # BLD AUTO: 9.75 K/UL
NEUTROPHILS NFR BLD AUTO: 67.7 %
PLATELET # BLD AUTO: 296 K/UL
POTASSIUM SERPL-SCNC: 5.1 MMOL/L
PROT SERPL-MCNC: 6.3 G/DL
PT BLD: 12.5 SEC
RBC # BLD: 4.55 M/UL
RBC # FLD: 14.6 %
SODIUM SERPL-SCNC: 141 MMOL/L
TACROLIMUS SERPL-MCNC: 9 NG/ML
WBC # FLD AUTO: 14.41 K/UL

## 2019-04-05 ENCOUNTER — APPOINTMENT (OUTPATIENT)
Dept: HEPATOLOGY | Facility: CLINIC | Age: 64
End: 2019-04-05
Payer: COMMERCIAL

## 2019-04-12 ENCOUNTER — APPOINTMENT (OUTPATIENT)
Dept: HEPATOLOGY | Facility: CLINIC | Age: 64
End: 2019-04-12
Payer: COMMERCIAL

## 2019-04-12 VITALS
HEIGHT: 65 IN | BODY MASS INDEX: 39.32 KG/M2 | RESPIRATION RATE: 16 BRPM | SYSTOLIC BLOOD PRESSURE: 128 MMHG | HEART RATE: 64 BPM | WEIGHT: 236 LBS | DIASTOLIC BLOOD PRESSURE: 79 MMHG | TEMPERATURE: 98.2 F

## 2019-04-12 DIAGNOSIS — D72.9 DISORDER OF WHITE BLOOD CELLS, UNSPECIFIED: ICD-10-CM

## 2019-04-12 PROCEDURE — 99214 OFFICE O/P EST MOD 30 MIN: CPT

## 2019-04-12 NOTE — ASSESSMENT
[FreeTextEntry1] : Ms. Crisostomo is a 62 yo  F who is s/p DDLT for AIH cirrhosis in 2004, with biopsy evidence of alloimmune hepatitis without fibrosis (biopsy done on 11/6/18). Her additional medical problems include: Afib on chronic anticoagulation with Eliquis, peripheral edema, history of squamous cell skin cancers, and gastric polyps with focal low and high grade dysplasia.\par \par # Post-DDLT with alloimmune hepatitis:\par - Liver enzymes improved with increased CNI immunosuppression, though AP and GGT remain modestly elevated.\par - Her current immunosuppression regimen is: prednisone 5 mg po bid, Cellcept 500 mg po bid, and FK 5.5 mg po q12h.\par - Goal tacrolimus level is 8-12.\par - Re-check labs tomorrow and monthly.\par - Did not tolerate full dose Cellcept in the past; defer adding azathioprine for now given stable labs.\par - Had minimal fibrosis on liver biopsy in 11/2018. Will plan for FibroScan for surveillance of fibrosis at next visit.\par \par # Peripheral edema:\par - Given minimal hepatic fibrosis, do not suspect that this is related to liver / portal hypertension.\par - No proteinuria on urinalysis.\par - Suspect cardiac etiology as well as possible venous insufficiency.\par - Will obtain prior TTE report from her cardiologist for review. He is also planning for a repeat ablation.\par \par # Discomfort at site of prior incisional hernia repair:\par - Will obtain abdominal CT imaging once performed (she states this is planned for sometime next month to prepare for her cardiac ablation) and review with her Transplant Surgeons, Dr. Lord and Dr. Laughlin.\par - Will coordinate Transplant Surgery visit with her next Liver clinic visit for further evaluation.\par - No obstructive symptoms.\par \par # Post-LT health maintenance:\par - Ms. CRISOSTOMO was counseled to: abstain from alcohol and all illicit drugs; avoid use of herbal and dietary supplements due to potential hepatotoxicity; and limit use of acetaminophen to <2 grams per day.\par - She was advised to use sun protection measures daily (sunscreen, hat, and long sleeves) and to continue q6 month follow-up with her dermatologist for full skin evaluation and given her history of squamous cells cancers.\par - She is due for repeat EGD for gastric surveillance (due to focal low and high grade dysplasia) in 8/2019 and due for repeat colonoscopy (due to prior suboptimal bowel prep) in 12/2019. Will coordinate with Dr. Hoa Bangura to try to have these scheduled for the same date (8/2019).\par - She is overdue for Pap and mammogram surveillance and was advised to follow-up with her PCP and gynecologist ASAP for those tests.\par - At her next visit, we will need to discuss additional health maintenance concerns including vaccinations, bone density screening, and discussion about her obesity.\par \par She will return to clinic in 3 months.

## 2019-04-12 NOTE — HISTORY OF PRESENT ILLNESS
[de-identified] : 10/4/18 [de-identified] : Ms. Crisostomo is a 64 yo  F with AIH cirrhosis diagnosed at age 29, s/p DDLT at [facility] in 2004 (Dr. Smita Lord). She was last seen by the transplant team at [facility] in 2013 or 2014, then was lost to follow-up for >4 years (though she reports still taking her immunosuppression throughout that time, with prescriptions renewed by local non-transplant physicians), and then established Liver care here in 10/2018. At the time that she established care, she had been taking prednisone 5 mg po bid, Cellcept 500 mg po bid, and tacrolimus 5 mg po bid for many years.\par \par When she re-established care, she was noted to have abnormal liver tests dating back to at least 6/2018 (her prior [facility] records are not yet available for review), with elevated AP in the 200s-300s, AST 60s-80s, ALT 50s-70s, and GGT, with normal bilirubin and INR, as well as mild leukocytosis with WBC 13. Abdominal US (done on 10/16/18) showed patent transplant vessels and no biliary dilatation. Laboratory work-up revealed no evidence of acute or chronic viral infection (including HBV, HCV, HEV, HSV, EBV, or CMV), positive TERESA 1:320 with speckled pattern, negative ASMA, negative SLA, and negative LKM. Her FK level was <2 on 10/4/18; it was later discovered that she had been given the wrong dose of FK by her pharmacy for 1 month (was taking 0.5 mg tabs instead of 5 mg tabs). Due to concern for possible rejection vs alloimmune hepatitis, she underwent a percutaneous liver biopsy on 11/6/18. The results of the biopsy were consistent with alloimmune hepatitis with no fibrosis and no evidence of acute or chronic rejection. Based on the biopsy results \par \par Based on the biopsy results, the decision was made to increase her CNI immunosuppression. She has not been able to tolerate higher dose Cellcept in the past because of diarrhea/incontinence at the higher doses, therefore her Cellcept was kept at 500 mg po bid. Azathioprine has not been added yet and her prednisone was kept at 5 mg po bid. Her tacrolimus was increased on 11/7/18 from 5 mg po bid to 6 mg po bid, then decreased to 5.5 mg po bid on 11/19/18. She has remained on that dose since then. Her most recent FK trough level was 9.0 on 2/23/19 (at goal). Her liver enzymes improved with the increased CNI (to AST 32 and ALT 33 on most recent labs), though her AP and GGT have remained moderately elevated at 272 and 329, respectively. Her bilirubin and INR remain within normal limits.\par \par She also has a history notable for atrial fibrillation (with prior cardioversions and ablation, followed at [facility] by cardiologist Dr. Doron Byrd) on chronic anticoagulation with Eliquis. She has had prior excisions of squamous cell carcinomas of the skin of her right arm (2016, 2018), right neck (2013), and above the right lip (2018) and is followed by a Dermatologist for continued cancer surveillance every 6 months. She was seen by GI Dr. Hoa Bangura in 2018 and underwent EGD on 8/3/18 with small EV not amenable to band ligation and gastric polyp s/p resection and clipping (pathology: gastric fundic gland polyp with focal high-grade dysplasia). Repeat EGD on 12/7/18 showed fundic gland polyps with focal low grade dysplasia s/p resection and clipping, with plan for repeat surveillance in 1 year. Colonoscopy from 8/2018 had suboptimal prep, sigmoid and transverse colon diverticulosis, and sigmoid polyp s/p biopsy (pathology: focal cryptitis); DC and rectum biopsies had no signfiicant histopathologic findings. Plan was for repeat colonoscopy in 1 year due to the suboptimal bowel prep.\par \par Today, she reports that she has been under a lot of stress in recent months due to combination of factors including her father's hospitalization in 1/2019 (he remains in a nursing facility), helping with her mother's care (she is in a different nursing facility), dealing with her ex- and selling their home, and trying to deal with her medical problems while working and being out of sick days. She is tearful at times.\par \par She reports that she is supposed to see Dr. Byrd in 5/2019 and that he may be planning another ablation for her Afib after that. She did not bring her prior TTE results to clinic today as requested. She thinks she is planned to get CT imaging in preparation for the ablation.\par \par She reports a sensation of a "growing ball" in her epigastric region near her scar, at the site where she previous had an incisional hernia repaired by Dr. Lord with mesh. She feels that the site bothers her sometimes when she breathes deeply.\par \par She is overdue for Pap smear (has not had one for "many years"). She is overdue for mammogram (last done 2 years ago).\par \par She reports taking her medications as prescribed without missed doses.

## 2019-07-16 ENCOUNTER — APPOINTMENT (OUTPATIENT)
Dept: FAMILY MEDICINE | Facility: CLINIC | Age: 64
End: 2019-07-16
Payer: COMMERCIAL

## 2019-07-16 VITALS
SYSTOLIC BLOOD PRESSURE: 130 MMHG | WEIGHT: 228 LBS | HEIGHT: 65.5 IN | DIASTOLIC BLOOD PRESSURE: 82 MMHG | BODY MASS INDEX: 37.53 KG/M2

## 2019-07-16 DIAGNOSIS — B96.20 URINARY TRACT INFECTION, SITE NOT SPECIFIED: ICD-10-CM

## 2019-07-16 DIAGNOSIS — N39.0 URINARY TRACT INFECTION, SITE NOT SPECIFIED: ICD-10-CM

## 2019-07-16 DIAGNOSIS — M54.5 LOW BACK PAIN: ICD-10-CM

## 2019-07-16 PROCEDURE — 99204 OFFICE O/P NEW MOD 45 MIN: CPT

## 2019-07-16 RX ORDER — APIXABAN 5 MG/1
5 TABLET, FILM COATED ORAL
Qty: 180 | Refills: 0 | Status: DISCONTINUED | COMMUNITY
Start: 2018-02-11 | End: 2019-07-16

## 2019-07-16 RX ORDER — METOPROLOL SUCCINATE 50 MG/1
50 TABLET, EXTENDED RELEASE ORAL
Refills: 0 | Status: DISCONTINUED | COMMUNITY
End: 2019-07-16

## 2019-07-16 RX ORDER — METOPROLOL SUCCINATE 50 MG/1
50 TABLET, EXTENDED RELEASE ORAL
Qty: 60 | Refills: 0 | Status: DISCONTINUED | COMMUNITY
Start: 2018-01-17 | End: 2019-07-16

## 2019-07-16 RX ORDER — FUROSEMIDE 20 MG/1
20 TABLET ORAL TWICE DAILY
Qty: 180 | Refills: 2 | Status: DISCONTINUED | COMMUNITY
Start: 2018-02-12 | End: 2019-07-16

## 2019-07-17 ENCOUNTER — APPOINTMENT (OUTPATIENT)
Dept: HEPATOLOGY | Facility: CLINIC | Age: 64
End: 2019-07-17

## 2019-07-18 PROBLEM — N39.0 E. COLI URINARY TRACT INFECTION: Status: RESOLVED | Noted: 2018-12-24 | Resolved: 2019-07-18

## 2019-07-18 PROBLEM — M54.5 LOW BACK PAIN, NON-SPECIFIC: Status: ACTIVE | Noted: 2019-07-16

## 2019-07-18 NOTE — PHYSICAL EXAM
[Normal] : no acute distress, well nourished, well developed and well-appearing [Normal Sclera/Conjunctiva] : normal sclera/conjunctiva [Normal Outer Ear/Nose] : the outer ears and nose were normal in appearance [No Respiratory Distress] : no respiratory distress  [No Accessory Muscle Use] : no accessory muscle use [Clear to Auscultation] : lungs were clear to auscultation bilaterally [Normal Rate] : normal rate  [Regular Rhythm] : with a regular rhythm [Normal S1, S2] : normal S1 and S2 [Pedal Pulses Present] : the pedal pulses are present [No Extremity Clubbing/Cyanosis] : no extremity clubbing/cyanosis [Soft] : abdomen soft [Non Tender] : non-tender [Non-distended] : non-distended [Normal Bowel Sounds] : normal bowel sounds [Normal Affect] : the affect was normal [Alert and Oriented x3] : oriented to person, place, and time [Normal Insight/Judgement] : insight and judgment were intact [de-identified] : +2 pitting edema b/l LE [de-identified] : healing surgical scars anterior to medial and lateral malleoli [de-identified] : ambulates with rolling walker

## 2019-07-18 NOTE — ASSESSMENT
[FreeTextEntry1] : Will request records form previous PCP and Rehab facility to determine what is due for HCM.\par \par Return in 3 weeks.

## 2019-07-18 NOTE — HISTORY OF PRESENT ILLNESS
[FreeTextEntry8] : Camelia is a 64-year old female who presents as a new patient. She is s/p right ankle surgery due to septic joint in June 2019, she was recently discharged from a rehab facility. She is in need of home PT and OT. She has difficulty ambulating up and down stairs. She is currently living alone in an apartment that has stairs to go into it. She has noted increase in LE edema - will be seeing vascular soon (Dr. Leger? from [facility]).\par She is a liver transplant patient who is on chronic immunosuppressive therapy.\par \par \par Patient is here to establish care. pt would like to have a physical therapy going to her house. pt had septic ankle, an infection in her right ankle. Pt had to have an emergency surgery on June 17th.

## 2019-07-29 ENCOUNTER — RX RENEWAL (OUTPATIENT)
Age: 64
End: 2019-07-29

## 2019-08-06 ENCOUNTER — RX RENEWAL (OUTPATIENT)
Age: 64
End: 2019-08-06

## 2019-08-06 ENCOUNTER — APPOINTMENT (OUTPATIENT)
Dept: FAMILY MEDICINE | Facility: CLINIC | Age: 64
End: 2019-08-06

## 2019-08-09 ENCOUNTER — RX RENEWAL (OUTPATIENT)
Age: 64
End: 2019-08-09

## 2019-08-20 ENCOUNTER — APPOINTMENT (OUTPATIENT)
Dept: FAMILY MEDICINE | Facility: CLINIC | Age: 64
End: 2019-08-20
Payer: COMMERCIAL

## 2019-08-20 VITALS
SYSTOLIC BLOOD PRESSURE: 124 MMHG | BODY MASS INDEX: 37.2 KG/M2 | HEIGHT: 65.5 IN | DIASTOLIC BLOOD PRESSURE: 76 MMHG | WEIGHT: 226 LBS | HEART RATE: 80 BPM | OXYGEN SATURATION: 96 %

## 2019-08-20 PROCEDURE — 99214 OFFICE O/P EST MOD 30 MIN: CPT

## 2019-08-20 NOTE — HISTORY OF PRESENT ILLNESS
[FreeTextEntry1] : patient is here for a follow up  [de-identified] : Camelia is a 64-year old female who presents for follow up on multiple medical conditions.\par Atrial fibrillation - controlled. Compliant with AC and beta-blocker; has appt with electrophysiologist this month.\par Obesity - lost 2 pounds; slowly becoming more mobile with assistance of PT, will be starting outpatient PT soon.\par Peripheral edema - no improvement; has not seen vascular yet - will schedule an appt

## 2019-08-20 NOTE — PHYSICAL EXAM
[Normal] : no acute distress, well nourished, well developed and well-appearing [Normal Sclera/Conjunctiva] : normal sclera/conjunctiva [Normal Outer Ear/Nose] : the outer ears and nose were normal in appearance [No Respiratory Distress] : no respiratory distress  [No Accessory Muscle Use] : no accessory muscle use [Clear to Auscultation] : lungs were clear to auscultation bilaterally [Regular Rhythm] : with a regular rhythm [Normal S1, S2] : normal S1 and S2 [No Extremity Clubbing/Cyanosis] : no extremity clubbing/cyanosis [Normal Affect] : the affect was normal [Non-distended] : non-distended [Normal Insight/Judgement] : insight and judgment were intact [Alert and Oriented x3] : oriented to person, place, and time [de-identified] : +3 b/l LE edema [de-identified] : ambulating with cane

## 2019-09-17 ENCOUNTER — APPOINTMENT (OUTPATIENT)
Dept: FAMILY MEDICINE | Facility: CLINIC | Age: 64
End: 2019-09-17

## 2019-10-04 ENCOUNTER — RX RENEWAL (OUTPATIENT)
Age: 64
End: 2019-10-04

## 2019-10-08 ENCOUNTER — APPOINTMENT (OUTPATIENT)
Dept: FAMILY MEDICINE | Facility: CLINIC | Age: 64
End: 2019-10-08

## 2019-10-17 ENCOUNTER — APPOINTMENT (OUTPATIENT)
Dept: FAMILY MEDICINE | Facility: CLINIC | Age: 64
End: 2019-10-17
Payer: COMMERCIAL

## 2019-10-17 VITALS
HEIGHT: 65.5 IN | WEIGHT: 230 LBS | HEART RATE: 106 BPM | TEMPERATURE: 98.9 F | DIASTOLIC BLOOD PRESSURE: 82 MMHG | OXYGEN SATURATION: 96 % | BODY MASS INDEX: 37.86 KG/M2 | SYSTOLIC BLOOD PRESSURE: 120 MMHG

## 2019-10-17 DIAGNOSIS — R29.898 OTHER SYMPTOMS AND SIGNS INVOLVING THE MUSCULOSKELETAL SYSTEM: ICD-10-CM

## 2019-10-17 DIAGNOSIS — M79.671 PAIN IN RIGHT FOOT: ICD-10-CM

## 2019-10-17 DIAGNOSIS — Z23 ENCOUNTER FOR IMMUNIZATION: ICD-10-CM

## 2019-10-17 DIAGNOSIS — R03.0 ELEVATED BLOOD-PRESSURE READING, W/OUT DIAGNOSIS OF HYPERTENSION: ICD-10-CM

## 2019-10-17 PROCEDURE — 90686 IIV4 VACC NO PRSV 0.5 ML IM: CPT

## 2019-10-17 PROCEDURE — 99214 OFFICE O/P EST MOD 30 MIN: CPT | Mod: 25

## 2019-10-17 PROCEDURE — G0008: CPT

## 2019-10-17 RX ORDER — OXYCODONE 5 MG/1
5 TABLET ORAL
Refills: 0 | Status: DISCONTINUED | COMMUNITY
End: 2019-10-17

## 2019-10-17 RX ORDER — OXYCODONE 10 MG/1
10 TABLET ORAL
Refills: 0 | Status: DISCONTINUED | COMMUNITY
End: 2019-10-17

## 2019-10-17 NOTE — ASSESSMENT
[FreeTextEntry1] : Return to office in one month for followup.\par We'll address more healthcare maintenance at that time.

## 2019-10-17 NOTE — HISTORY OF PRESENT ILLNESS
[FreeTextEntry2] : Camelia is a 64-year old female with a PMH of atrial fibrillation (on AC and antiarrhythmic) who presents for followup after being discharged from rehabilitation on September 28, 2019. She was initially hospitalized at [facility] due to septic joint of her right wrist. She was placed on several different antibiotics given through a PICC line. And she is status post surgery. She has been home for the past several weeks receiving home nursing care and physical therapy. However she reports that she has not had any occupational therapy. She reports that she feels weakness in her right hand she is unable to write or type.\par She also feels significant weakness with end of the day which point she has difficulty holding onto things.\par \par She has not seen vascular surgery regarding her lower extremity edema.\par

## 2019-10-17 NOTE — PHYSICAL EXAM
[Normal Sclera/Conjunctiva] : normal sclera/conjunctiva [No Respiratory Distress] : no respiratory distress  [Normal Outer Ear/Nose] : the outer ears and nose were normal in appearance [Clear to Auscultation] : lungs were clear to auscultation bilaterally [No Accessory Muscle Use] : no accessory muscle use [Normal Rate] : normal rate  [Normal S1, S2] : normal S1 and S2 [No Extremity Clubbing/Cyanosis] : no extremity clubbing/cyanosis [No Rash] : no rash [Non-distended] : non-distended [Normal] : affect was normal and insight and judgment were intact [Coordination Grossly Intact] : coordination grossly intact [de-identified] : ambulating with walker [de-identified] : + b/l LE edema

## 2019-11-26 ENCOUNTER — LABORATORY RESULT (OUTPATIENT)
Age: 64
End: 2019-11-26

## 2019-11-27 ENCOUNTER — APPOINTMENT (OUTPATIENT)
Dept: FAMILY MEDICINE | Facility: CLINIC | Age: 64
End: 2019-11-27
Payer: COMMERCIAL

## 2019-11-27 VITALS
HEART RATE: 110 BPM | SYSTOLIC BLOOD PRESSURE: 122 MMHG | BODY MASS INDEX: 38.52 KG/M2 | DIASTOLIC BLOOD PRESSURE: 78 MMHG | WEIGHT: 234 LBS | OXYGEN SATURATION: 97 % | HEIGHT: 65.5 IN

## 2019-11-27 DIAGNOSIS — E66.01 MORBID (SEVERE) OBESITY DUE TO EXCESS CALORIES: ICD-10-CM

## 2019-11-27 PROCEDURE — 99214 OFFICE O/P EST MOD 30 MIN: CPT | Mod: 25

## 2019-11-27 PROCEDURE — 36415 COLL VENOUS BLD VENIPUNCTURE: CPT

## 2019-11-27 NOTE — PHYSICAL EXAM
[Normal Sclera/Conjunctiva] : normal sclera/conjunctiva [Normal Outer Ear/Nose] : the outer ears and nose were normal in appearance [No Respiratory Distress] : no respiratory distress  [No Accessory Muscle Use] : no accessory muscle use [Clear to Auscultation] : lungs were clear to auscultation bilaterally [Normal S1, S2] : normal S1 and S2 [Non-distended] : non-distended [Coordination Grossly Intact] : coordination grossly intact [Normal] : affect was normal and insight and judgment were intact [de-identified] : irregular [de-identified] : + 3 pitting edema b/l LE, mild clubbing [de-identified] : ambulate with cane

## 2019-11-27 NOTE — HISTORY OF PRESENT ILLNESS
[FreeTextEntry1] : patient is here for a follow up  [de-identified] : Camelia is a 64-year old female who presents for follow up on multiple medical conditions.\par Atrial fibrillation - controlled. Compliant with AC and beta-blocker; has appt with electrophysiologist this month.\par Obesity - uncontrolled, gained weight\par Peripheral edema - no improvement; reports that it has been worsening and that the lasix is not as effective as it has been previously; has not seen vascular yet - will schedule an appt before the end og the year

## 2019-11-27 NOTE — ASSESSMENT
[FreeTextEntry1] : RTO in 1 month to f.u on consults and HCM.\par Has script for mammo (will have it done today), and given card for GYN.

## 2019-12-01 LAB
ANION GAP SERPL CALC-SCNC: 16 MMOL/L
BASOPHILS # BLD AUTO: 0.05 K/UL
BASOPHILS NFR BLD AUTO: 0.3 %
BUN SERPL-MCNC: 24 MG/DL
CALCIUM SERPL-MCNC: 9.4 MG/DL
CHLORIDE SERPL-SCNC: 102 MMOL/L
CHOLEST SERPL-MCNC: 169 MG/DL
CHOLEST/HDLC SERPL: 2.5 RATIO
CO2 SERPL-SCNC: 23 MMOL/L
CREAT SERPL-MCNC: 0.88 MG/DL
EOSINOPHIL # BLD AUTO: 0.2 K/UL
EOSINOPHIL NFR BLD AUTO: 1.3 %
GLUCOSE SERPL-MCNC: 168 MG/DL
HCT VFR BLD CALC: 42 %
HDLC SERPL-MCNC: 67 MG/DL
HGB BLD-MCNC: 12.5 G/DL
IMM GRANULOCYTES NFR BLD AUTO: 0.4 %
LDLC SERPL CALC-MCNC: 88 MG/DL
LYMPHOCYTES # BLD AUTO: 3.45 K/UL
LYMPHOCYTES NFR BLD AUTO: 22.3 %
MAN DIFF?: NORMAL
MCHC RBC-ENTMCNC: 29.3 PG
MCHC RBC-ENTMCNC: 29.8 GM/DL
MCV RBC AUTO: 98.4 FL
MONOCYTES # BLD AUTO: 0.95 K/UL
MONOCYTES NFR BLD AUTO: 6.1 %
NEUTROPHILS # BLD AUTO: 10.78 K/UL
NEUTROPHILS NFR BLD AUTO: 69.6 %
PLATELET # BLD AUTO: 322 K/UL
POTASSIUM SERPL-SCNC: 5.3 MMOL/L
RBC # BLD: 4.27 M/UL
RBC # FLD: 14.5 %
SODIUM SERPL-SCNC: 141 MMOL/L
TRIGL SERPL-MCNC: 71 MG/DL
TSH SERPL-ACNC: 0.21 UIU/ML
WBC # FLD AUTO: 15.49 K/UL

## 2020-01-07 NOTE — CONSULT LETTER
BMI Within normal limits, continue current management. [Dear  ___] : Dear  [unfilled], [Courtesy Letter:] : I had the pleasure of seeing your patient, [unfilled], in my office today. [FreeTextEntry2] : Dr. Marielos Lewis [Please see my note below.] : Please see my note below. [FreeTextEntry3] : Sincerely,\par \par Kezia Foster M.D., Ph.D.\par [facility]\par [address]\[place] [address]\par Tel: [phone]\par Fax: [phone]\par Cell: [phone]\par E-mail: [email or web address]\par

## 2020-01-13 ENCOUNTER — APPOINTMENT (OUTPATIENT)
Dept: FAMILY MEDICINE | Facility: CLINIC | Age: 65
End: 2020-01-13

## 2020-01-22 ENCOUNTER — APPOINTMENT (OUTPATIENT)
Dept: OBGYN | Facility: CLINIC | Age: 65
End: 2020-01-22
Payer: COMMERCIAL

## 2020-01-22 VITALS
TEMPERATURE: 98 F | WEIGHT: 238 LBS | DIASTOLIC BLOOD PRESSURE: 80 MMHG | HEIGHT: 65.5 IN | SYSTOLIC BLOOD PRESSURE: 120 MMHG | BODY MASS INDEX: 39.17 KG/M2

## 2020-01-22 DIAGNOSIS — M41.9 SCOLIOSIS, UNSPECIFIED: ICD-10-CM

## 2020-01-22 DIAGNOSIS — N95.0 POSTMENOPAUSAL BLEEDING: ICD-10-CM

## 2020-01-22 DIAGNOSIS — Z12.4 ENCOUNTER FOR SCREENING FOR MALIGNANT NEOPLASM OF CERVIX: ICD-10-CM

## 2020-01-22 DIAGNOSIS — Z86.718 PERSONAL HISTORY OF OTHER VENOUS THROMBOSIS AND EMBOLISM: ICD-10-CM

## 2020-01-22 PROCEDURE — 99386 PREV VISIT NEW AGE 40-64: CPT

## 2020-01-22 RX ORDER — CHLORHEXIDINE GLUCONATE 4 %
LIQUID (ML) TOPICAL
Refills: 0 | Status: COMPLETED | COMMUNITY
End: 2020-01-22

## 2020-01-22 RX ORDER — SENNOSIDES 8.6 MG/1
CAPSULE, GELATIN COATED ORAL
Refills: 0 | Status: COMPLETED | COMMUNITY
End: 2020-01-22

## 2020-01-22 NOTE — CHIEF COMPLAINT
[Initial Visit] : initial GYN visit [FreeTextEntry1] : 63 yo  liver transplant female with vaginal spotting presents for new gyn visit.

## 2020-01-22 NOTE — PHYSICAL EXAM
[Acute Distress] : no acute distress [Awake] : awake [Alert] : alert [Thyroid Nodule] : no thyroid nodule [LAD] : no lymphadenopathy [Mass] : no breast mass [Goiter] : no goiter [Axillary LAD] : no axillary lymphadenopathy [Nipple Discharge] : no nipple discharge [Soft] : soft [Tender] : non tender [Distended] : not distended [Oriented x3] : oriented to person, place, and time [H/Smegaly] : no hepatosplenomegaly [Flat Affect] : affect not flat [Depressed Mood] : depressed mood [Normal] : uterus [Atrophy] : atrophy [Cystocele] : a cystocele [No Bleeding] : there was no active vaginal bleeding [Rectocele] : no rectocele [Occult Blood] : occult blood test from digital rectal exam was negative [Uterine Adnexae] : were not tender and not enlarged [Nl Sphincter Tone] : normal sphincter tone [Internal Hemorrhoid] : no internal hemorrhoids [External Hemorrhoid] : an external hemorrhoid [FreeTextEntry4] : bladder with smooth and dry skin protrudes to introitus, draping over cervix [FreeTextEntry5] : Parous, atrophic and descended to within 2 cm from introitus. Separation of transverse perineal muscle. Thin perineum.

## 2020-01-22 NOTE — HISTORY OF PRESENT ILLNESS
[Last Mammogram ___] : Last Mammogram was [unfilled] [Postmenopausal] : is postmenopausal [Last Pap ___] : Last cervical pap smear was [unfilled] [Sexually Active] : is not sexually active

## 2020-01-22 NOTE — REVIEW OF SYSTEMS
[Constipation] : no constipation [Diarrhea] : no diarrhea [Melena] : no melena [Dysuria] : no dysuria [Incontinence] : incontinence [Pelvic Pain] : pelvic pain [Abn Vag Bleeding] : abnormal vaginal bleeding [Urgency] : no urgency [Frequency] : no frequency [Anxiety] : anxiety [Depression] : depression [Nl] : Integumentary

## 2020-01-24 LAB — HPV HIGH+LOW RISK DNA PNL CVX: NOT DETECTED

## 2020-01-27 ENCOUNTER — RX RENEWAL (OUTPATIENT)
Age: 65
End: 2020-01-27

## 2020-01-29 ENCOUNTER — RX RENEWAL (OUTPATIENT)
Age: 65
End: 2020-01-29

## 2020-02-11 ENCOUNTER — RX RENEWAL (OUTPATIENT)
Age: 65
End: 2020-02-11

## 2020-02-14 ENCOUNTER — NON-APPOINTMENT (OUTPATIENT)
Age: 65
End: 2020-02-14

## 2020-02-21 ENCOUNTER — FORM ENCOUNTER (OUTPATIENT)
Age: 65
End: 2020-02-21

## 2020-02-22 ENCOUNTER — APPOINTMENT (OUTPATIENT)
Dept: ULTRASOUND IMAGING | Facility: CLINIC | Age: 65
End: 2020-02-22

## 2020-02-22 ENCOUNTER — OUTPATIENT (OUTPATIENT)
Dept: OUTPATIENT SERVICES | Facility: HOSPITAL | Age: 65
LOS: 1 days | End: 2020-02-22
Payer: COMMERCIAL

## 2020-02-22 DIAGNOSIS — Z98.84 BARIATRIC SURGERY STATUS: Chronic | ICD-10-CM

## 2020-02-22 DIAGNOSIS — Z95.828 PRESENCE OF OTHER VASCULAR IMPLANTS AND GRAFTS: Chronic | ICD-10-CM

## 2020-02-22 DIAGNOSIS — Z94.4 LIVER TRANSPLANT STATUS: Chronic | ICD-10-CM

## 2020-02-22 DIAGNOSIS — M11.20 OTHER CHONDROCALCINOSIS, UNSPECIFIED SITE: Chronic | ICD-10-CM

## 2020-02-22 DIAGNOSIS — Z98.890 OTHER SPECIFIED POSTPROCEDURAL STATES: Chronic | ICD-10-CM

## 2020-02-22 DIAGNOSIS — N95.0 POSTMENOPAUSAL BLEEDING: ICD-10-CM

## 2020-02-22 PROCEDURE — 76830 TRANSVAGINAL US NON-OB: CPT

## 2020-02-22 PROCEDURE — 76856 US EXAM PELVIC COMPLETE: CPT | Mod: 26

## 2020-02-22 PROCEDURE — 76856 US EXAM PELVIC COMPLETE: CPT

## 2020-02-22 PROCEDURE — 76830 TRANSVAGINAL US NON-OB: CPT | Mod: 26

## 2020-02-26 ENCOUNTER — APPOINTMENT (OUTPATIENT)
Dept: FAMILY MEDICINE | Facility: CLINIC | Age: 65
End: 2020-02-26
Payer: COMMERCIAL

## 2020-02-26 ENCOUNTER — APPOINTMENT (OUTPATIENT)
Dept: OBGYN | Facility: CLINIC | Age: 65
End: 2020-02-26
Payer: COMMERCIAL

## 2020-02-26 VITALS
BODY MASS INDEX: 39.51 KG/M2 | DIASTOLIC BLOOD PRESSURE: 64 MMHG | SYSTOLIC BLOOD PRESSURE: 130 MMHG | WEIGHT: 240 LBS | OXYGEN SATURATION: 97 % | HEART RATE: 88 BPM | HEIGHT: 65.5 IN

## 2020-02-26 VITALS
SYSTOLIC BLOOD PRESSURE: 130 MMHG | HEIGHT: 65.5 IN | WEIGHT: 235 LBS | BODY MASS INDEX: 38.68 KG/M2 | DIASTOLIC BLOOD PRESSURE: 85 MMHG

## 2020-02-26 DIAGNOSIS — Z86.39 PERSONAL HISTORY OF OTHER ENDOCRINE, NUTRITIONAL AND METABOLIC DISEASE: ICD-10-CM

## 2020-02-26 PROCEDURE — 99214 OFFICE O/P EST MOD 30 MIN: CPT

## 2020-02-26 RX ORDER — METOPROLOL SUCCINATE 100 MG/1
100 TABLET, EXTENDED RELEASE ORAL TWICE DAILY
Refills: 0 | Status: COMPLETED | COMMUNITY
End: 2020-02-26

## 2020-02-26 RX ORDER — OMEPRAZOLE 40 MG/1
40 CAPSULE, DELAYED RELEASE ORAL
Qty: 30 | Refills: 2 | Status: COMPLETED | COMMUNITY
End: 2020-02-26

## 2020-02-26 RX ORDER — TACROLIMUS 5 MG/1
5 CAPSULE ORAL
Qty: 180 | Refills: 0 | Status: COMPLETED | COMMUNITY
Start: 2018-11-08 | End: 2020-02-26

## 2020-02-26 RX ORDER — MYCOPHENOLATE MOFETIL 500 MG/1
500 TABLET ORAL
Qty: 180 | Refills: 1 | Status: COMPLETED | COMMUNITY
Start: 2018-02-07 | End: 2020-02-26

## 2020-02-26 NOTE — COUNSELING
[Use recommended devices] : Use recommended devices [Fall prevention counseling provided] : Fall prevention counseling provided [Behavioral health counseling provided] : Behavioral health counseling provided [Sleep ___ hours/day] : Sleep [unfilled] hours/day [Plan in advance] : Plan in advance [None] : None [Good understanding] : Patient has a good understanding of lifestyle changes and steps needed to achieve self management goal

## 2020-02-26 NOTE — HEALTH RISK ASSESSMENT
[Intercurrent hospitalizations] : was admitted to the hospital  [No] : No [No falls in past year] : Patient reported no falls in the past year [0] : 2) Feeling down, depressed, or hopeless: Not at all (0) [Patient reported mammogram was normal] : Patient reported mammogram was normal [Patient reported colonoscopy was normal] : Patient reported colonoscopy was normal [Patient reported PAP Smear was normal] : Patient reported PAP Smear was normal [With Patient/Caregiver] : With Patient/Caregiver [Designated Healthcare Proxy] : Designated healthcare proxy [Relationship: ___] : Relationship: [unfilled] [Name: ___] : Health Care Proxy's Name: [unfilled]  [Aggressive treatment] : aggressive treatment [I will adhere to the patient's wishes as expressed in the advance directive except as noted below.] : I will adhere to the patient's wishes as expressed in the advance directive except as noted below [] : No [de-identified] : sining transplant team on Friday and cards/ ep today  [de-identified] : none  [de-identified] : zev diet  [[id number]] : 0 [Change in mental status noted] : No change in mental status noted [MammogramDate] : 11/19/19 [PapSmearDate] : 2/22/20 [BoneDensityComments] : ostopina [ColonoscopyDate] : 2018 [HIVDate] : neg [HepatitisCDate] : neg [AdvancecareDate] : 2/26/20

## 2020-02-26 NOTE — HISTORY OF PRESENT ILLNESS
[Admitted on: ___] : The patient was admitted on [unfilled] [Discharged on ___] : discharged on [unfilled] [Post-hospitalization from ___ Hospital] : Post-hospitalization from [unfilled] Hospital [Discharge Summary] : discharge summary [Pertinent Labs] : pertinent labs [Radiology Findings] : radiology findings [Med Reconciliation] : medication reconciliation has been completed [FreeTextEntry2] : [facility]. Hyponatremia, proteinuria, ADAL, UTI, Hemarthrosis of ankle. septic shock and found surgical clips in abodomen .

## 2020-02-26 NOTE — REVIEW OF SYSTEMS
[Recent Wt Gain ___ Lbs] : recent [unfilled] ~Ulb weight gain [Joint Swelling] : joint swelling [Nl] : Hematologic/Lymphatic

## 2020-02-26 NOTE — ASSESSMENT
[FreeTextEntry1] : imp: 65 y/o F with multiple medical problems seen post hospital visit ( see scanned documents) found to be dehydrated with low bp had med adjustment and is now doing well beta blocker stopped. \par Plan: continue current tx plan \par keep your speciality apt today and Transplant team visit on Friday f/u here for HCM annually and as needed

## 2020-02-26 NOTE — PHYSICAL EXAM
[Well Nourished] : well nourished [No Acute Distress] : no acute distress [Well Developed] : well developed [Well-Appearing] : well-appearing [Normal Sclera/Conjunctiva] : normal sclera/conjunctiva [PERRL] : pupils equal round and reactive to light [EOMI] : extraocular movements intact [Normal Oropharynx] : the oropharynx was normal [Normal Outer Ear/Nose] : the outer ears and nose were normal in appearance [No Lymphadenopathy] : no lymphadenopathy [No JVD] : no jugular venous distention [No Respiratory Distress] : no respiratory distress  [Thyroid Normal, No Nodules] : the thyroid was normal and there were no nodules present [Supple] : supple [Clear to Auscultation] : lungs were clear to auscultation bilaterally [No Accessory Muscle Use] : no accessory muscle use [Normal Rate] : normal rate  [Regular Rhythm] : with a regular rhythm [Normal S1, S2] : normal S1 and S2 [No Carotid Bruits] : no carotid bruits [No Murmur] : no murmur heard [No Abdominal Bruit] : a ~M bruit was not heard ~T in the abdomen [No Varicosities] : no varicosities [Pedal Pulses Present] : the pedal pulses are present [No Edema] : there was no peripheral edema [No Extremity Clubbing/Cyanosis] : no extremity clubbing/cyanosis [No Palpable Aorta] : no palpable aorta [Soft] : abdomen soft [Non Tender] : non-tender [Non-distended] : non-distended [No HSM] : no HSM [Normal Bowel Sounds] : normal bowel sounds [Normal Posterior Cervical Nodes] : no posterior cervical lymphadenopathy [Normal Anterior Cervical Nodes] : no anterior cervical lymphadenopathy [No Spinal Tenderness] : no spinal tenderness [No CVA Tenderness] : no CVA  tenderness [Grossly Normal Strength/Tone] : grossly normal strength/tone [No Joint Swelling] : no joint swelling [No Rash] : no rash [Coordination Grossly Intact] : coordination grossly intact [No Focal Deficits] : no focal deficits [Normal Gait] : normal gait [Deep Tendon Reflexes (DTR)] : deep tendon reflexes were 2+ and symmetric [Normal Insight/Judgement] : insight and judgment were intact [Normal Affect] : the affect was normal [de-identified] : obese well healed surgical scar

## 2020-02-28 ENCOUNTER — APPOINTMENT (OUTPATIENT)
Dept: HEPATOLOGY | Facility: CLINIC | Age: 65
End: 2020-02-28
Payer: COMMERCIAL

## 2020-02-28 VITALS
SYSTOLIC BLOOD PRESSURE: 144 MMHG | RESPIRATION RATE: 16 BRPM | WEIGHT: 228 LBS | TEMPERATURE: 97.7 F | HEART RATE: 120 BPM | DIASTOLIC BLOOD PRESSURE: 97 MMHG | HEIGHT: 65.5 IN | BODY MASS INDEX: 37.53 KG/M2

## 2020-02-28 DIAGNOSIS — Z29.8 ENCOUNTER FOR OTHER SPECIFIED PROPHYLACTIC MEASURES: ICD-10-CM

## 2020-02-28 PROCEDURE — 99215 OFFICE O/P EST HI 40 MIN: CPT

## 2020-02-28 RX ORDER — TACROLIMUS 0.5 MG/1
0.5 CAPSULE ORAL
Qty: 180 | Refills: 0 | Status: DISCONTINUED | COMMUNITY
Start: 2018-11-19 | End: 2020-02-28

## 2020-02-28 RX ORDER — TRAMADOL HYDROCHLORIDE 50 MG/1
50 TABLET, COATED ORAL
Qty: 2 | Refills: 0 | Status: COMPLETED | COMMUNITY
Start: 2020-02-28 | End: 2020-02-29

## 2020-02-28 NOTE — ASSESSMENT
[FreeTextEntry1] : # Post-DDLT with alloimmune hepatitis:\par - Had minimal fibrosis on liver biopsy in 11/2018.\par - Liver enzymes remain elevated (mainly ALP and GGT, with minimal AST elevation), but stable compared to 1 year ago. Graft function remains preserved.\par - Given multiple recent severe infections when on higher immunosuppression, will aim for much lower goal tacrolimus trough level of ~4-6 ng/mL and will remain off Cellcept for now.\par - Continue prednisone 5 mg po bid.\par - Increase tacrolimus to 2 mg po q12h.\par - Re-check labs monthly.\par - Ordered FibroScan for next visit for surveillance of hepatic fibrosis to ensure not worsening.\par \par # Peripheral edema:\par - Most likely secondary to CHF as well as chronic venous stasis.\par - Advised to try using compression stockings again.\par - Increase furosemide to 40 mg po daily as needed, otherwise 20 mg po daily.\par - No proteinuria on prior urinalysis to suggest nephrotic syndrome.\par - Will follow-up repeat TTE when done by her Cardiologist to see whether heart function has declined.\par \par # Post-LT health maintenance:\par - Ms. MCLEAN was counseled to: abstain from alcohol and all illicit drugs; avoid use of herbal and dietary supplements due to potential hepatotoxicity; and limit use of acetaminophen to <2 grams per day.\par - She was advised to use sun protection measures daily (sunscreen, hat, and long sleeves) and to continue q6 month follow-up with her dermatologist for full skin evaluation and given her history of squamous cells cancers.\par - She was due for repeat EGD for gastric surveillance (due to focal low and high grade dysplasia) in 8/2019 and due for repeat colonoscopy (due to prior suboptimal bowel prep) in 12/2019. Will reach out to her GI DrFrantz Bangura to try to have these scheduled.\par - Last Pap normal on 1/22/20. Needs EMB per her gynecologist - ok for doxycycline for pre-procedure prophylaxis but would prefer to avoid NSAIDs given her anticoagulation as well as risk of ADAL, therefore prescribing tramadol 50 mg po q12h prn moderate/severe pain (#2, no refill).\par - Last MMG 11/2019 BIRAD 2.\par - At her next visit, we will need to discuss additional health maintenance concerns including vaccinations, bone density screening, and discussion about her obesity.\par \par She will return for follow-up in 3 months.

## 2020-02-28 NOTE — CONSULT LETTER
[Dear  ___] : Dear  [unfilled], [Courtesy Letter:] : I had the pleasure of seeing your patient, [unfilled], in my office today. [Please see my note below.] : Please see my note below. [FreeTextEntry2] : Dr. Lubna Kennedy [Consult Closing:] : Thank you very much for allowing me to participate in the care of this patient.  If you have any questions, please do not hesitate to contact me. [FreeTextEntry3] : Sincerely,\par \par Kezia Foster M.D., Ph.D.\par  of Medicine\par [facility]\par [address] [place] [address]\[place] [address]\par Tel: [phone]\par Fax: [phone]\par Cell: [phone]\par E-mail: [email or web address]\par

## 2020-02-28 NOTE — REVIEW OF SYSTEMS
[As Noted in HPI] : as noted in HPI [Lower Ext Edema] : lower extremity edema [Abn Vaginal Bleeding] : unexplained vaginal bleeding [Arthralgias] : arthralgias [Negative] : Heme/Lymph

## 2020-02-28 NOTE — HISTORY OF PRESENT ILLNESS
[de-identified] : Ms. Crisostomo is a 65 yo  F with AIH cirrhosis diagnosed at age 29, s/p DDLT at [facility] (Dr. Smita Lord) in 2004, now with alloimmune hepatitis (with percutaneous liver biopsy on 11/6/18 that showed alloimmune hepatitis with no fibrosis). Her post-transplant medical history is also notable for: morbid obesity, a history of DVTs s/p IVC filter placement, chronic Afib (s/p prior cardioversions and ablation, on chronic anticoagulation with Eliquis), HFrEF (with TTE in 1/2018 with LVEF 46%, moderate diastolic dysfunction, moderate LVH, and severe LAE), excisions of squamous cell carcinomas of the skin of her right arm (2016, 2018), right neck (2013), and above the right lip (2018), gastric fundic gland polyps with focal high-grade vs low-grade dysplasia (EGD 2018), and postmenopausal vaginal bleeding.\par \par PCP: Dr. Silvia Nash\par Cardiology: Dr. Doron Byrd\par GI: Dr. Hoa Bangura\par Gyn: Dr. Lubna Kennedy\par \par She was last seen in this office on 4/12/19. At that visit, her tacrolimus was slightly increased to 5.5 mg po q12h and she remained on prednisone 5 mg po bid and  mg po bid.\par \par She has had multiple missed/re-scheduled visits since then due to multiple hospitalizations (all at [place]) in the past 10 months:\par \par She was admitted from 6/21/19-7/10/19 with left ankle septic arthritis vs pseudogout, s/p OR washout, s/p IV antibiotics (completed on 7/7/19). Her MMF was held during her admission and later resumed on 7/16/19.\par \par She was admitted from 9/4/19-9/13/19 with right hand/wrist infection, s/p OR washout, s/p IV antibiotics. Again, her MMF was held during her admission and then later resumed. She was discharged to a rehab facility and then returned home.\par \par Most recently, she was admitted from 2/14/20-2/18/20 due to hypotension, ADAL, and hyponatremia secondary to dehydration vs urosepsis. Her FK and MMF were both held initially during her admission, then her FK was resumed at a much lower dose of 0.5 mg po q12h as of 2/17/20. She remains off MMF currently.\par \par Today, she reports that she is feeling "better than in years", with great energy level and improved mental clarity. She thinks the positive changes may be related to her lower immunosuppression or to the change in her metoprolol dosing. She says even her co-workers noted that she looks better than usual in the past week. She is still having a lot of swelling in her lower legs. She has not tried compression stockings recently. She sometimes takes 40 mg/day instead of 20 mg/day of the furosemide for relief.\par \par She had lab work done recently at [facility] (2/22/20):\par BMP unremarkable except Gluc 105, with Na 138, K 4.5, and Cr 0.79; Mg 1.6, Phos 2.8\par Liver panel: , TP 6.3, Alb 3.7, AST 33, ALT 30, TB 0.9,  -- all stable compared to 2/2019\par CBC: WBC 13.4 with increased neutrophils, Hb 12.4, Plt 360\par Vitamin D 25OH: 23.5\par Tacrolimus trough level: 1.2 ng/mL (low) [de-identified] : 10/4/18

## 2020-05-20 ENCOUNTER — RX RENEWAL (OUTPATIENT)
Age: 65
End: 2020-05-20

## 2020-05-26 ENCOUNTER — RECORD ABSTRACTING (OUTPATIENT)
Age: 65
End: 2020-05-26

## 2020-07-24 ENCOUNTER — APPOINTMENT (OUTPATIENT)
Dept: HEPATOLOGY | Facility: CLINIC | Age: 65
End: 2020-07-24

## 2020-07-29 ENCOUNTER — APPOINTMENT (OUTPATIENT)
Dept: OBGYN | Facility: CLINIC | Age: 65
End: 2020-07-29
Payer: COMMERCIAL

## 2020-07-29 VITALS
SYSTOLIC BLOOD PRESSURE: 130 MMHG | HEIGHT: 65.5 IN | WEIGHT: 250 LBS | TEMPERATURE: 99.4 F | DIASTOLIC BLOOD PRESSURE: 98 MMHG | BODY MASS INDEX: 41.15 KG/M2

## 2020-07-29 PROCEDURE — 58100 BIOPSY OF UTERUS LINING: CPT

## 2020-07-29 NOTE — PROCEDURE
[Endometrial Biopsy] : Endometrial biopsy [Risks] : risks [Post-Menop. Bleeding] : post-menopausal bleeding [Benefits] : benefits [Alternatives] : alternatives [Infection] : infection [Patient] : patient [Bleeding] : bleeding [Allergic Reaction] : allergic reaction [Uterine Perforation] : uterine perforation [Pain] : pain [None] : none [Easy Passage] : allowed easy passage of a uterine sound without dilation [CONSENT OBTAINED] : written consent was obtained prior to the procedure. [Sounded to ___ cm] : sounded to [unfilled] ~Ucm [Mid Position] : mid position [Pipelle] : a Pipelle endometrial suction curette [Abundant] : an abundant [Sent to Histology] : the specimen was place in buffered formalin and sent for pathlogy [Tolerated Well] : the patient tolerated the procedure well [de-identified] : US ECHO was 18 mm [No Complications] : there were no complications [de-identified] : Allis clamp to anterior cervix [de-identified] : doxycycline 100 mg last night and this am. Tramadol per patient's MD

## 2020-08-07 ENCOUNTER — APPOINTMENT (OUTPATIENT)
Dept: OBGYN | Facility: CLINIC | Age: 65
End: 2020-08-07
Payer: COMMERCIAL

## 2020-08-07 PROCEDURE — 99441: CPT

## 2020-08-12 ENCOUNTER — APPOINTMENT (OUTPATIENT)
Dept: GYNECOLOGIC ONCOLOGY | Facility: CLINIC | Age: 65
End: 2020-08-12
Payer: COMMERCIAL

## 2020-08-12 PROCEDURE — 93976 VASCULAR STUDY: CPT | Mod: 59

## 2020-08-12 PROCEDURE — 76830 TRANSVAGINAL US NON-OB: CPT | Mod: 59

## 2020-08-12 PROCEDURE — 99204 OFFICE O/P NEW MOD 45 MIN: CPT | Mod: 25

## 2020-08-12 PROCEDURE — 76857 US EXAM PELVIC LIMITED: CPT | Mod: 59

## 2020-08-12 RX ORDER — DOXYCYCLINE HYCLATE 100 MG/1
100 CAPSULE ORAL
Qty: 5 | Refills: 0 | Status: DISCONTINUED | COMMUNITY
Start: 2020-02-28 | End: 2020-08-12

## 2020-08-18 NOTE — END OF VISIT
[FreeTextEntry3] : Written by Monae AMEZCUA, acting as a scribe for Dr. Rogers Paredes.\par This note accurately reflects the work and decisions made by me.\par

## 2020-08-18 NOTE — HISTORY OF PRESENT ILLNESS
[FreeTextEntry1] : This 66yo ,  x 4, referred by Dr. Kennedy for newly diagnosed endometrial adenocarcinoma. Pt reports she never went through menopause and has persistent vaginal staining for years. Reports that over the last few years the vaginal staining is minimal and occurring once weekly. Denies pelvic pain or cramping. Pelvic US on 20 revealed thickened endometrial lining at 18mm with simple appearing right adnexal cyst. Endometrial biopsy on 20 revealed FIGO grade 2 endometrioid adenocarcinoma, MSI intact. History of liver transplant in  due to autoimmune hepatitis. She is also on Eliquis for A-fib, history of DVT many years ago. \par \par Pap smear-20-neg\par Mammo-2019-wnl\par Colonoscopy-2018-polyps removed\par [id number]\par

## 2020-08-18 NOTE — CHIEF COMPLAINT
[FreeTextEntry1] : [facility]\par \par [facility] [phone] at [address] [place] [address]\par

## 2020-08-18 NOTE — PHYSICAL EXAM
[Nonpitting Edema] : nonpitting edema present [Obese] : obese [Abnormal] : Uterus: Abnormal [Normal] : Parametria: Normal [de-identified] : Patient was interviewed and examined with chaperone present. Name of chaperone: Moane Rey  [de-identified] : + prolapse

## 2020-08-18 NOTE — ASSESSMENT
[FreeTextEntry1] : 66yo female with newly diagnosed endometrial adenocarcinoma FIGO grade 2 with uterine prolapse on exam today. History of liver transplant in 2004 with abdominal mesh in place. Also on anticoagulants for A-fib. She has chronic lower leg edema and history of DVT. I would recommend a pet ct scan to evaluate for metastatic disease and LE venous dopplers. Will regroup in 1 week to discuss surgery which would likely be a a vaginal hysterectomy due to her prolapse.

## 2020-08-19 ENCOUNTER — APPOINTMENT (OUTPATIENT)
Dept: NUCLEAR MEDICINE | Facility: CLINIC | Age: 65
End: 2020-08-19
Payer: COMMERCIAL

## 2020-08-19 ENCOUNTER — APPOINTMENT (OUTPATIENT)
Dept: ULTRASOUND IMAGING | Facility: CLINIC | Age: 65
End: 2020-08-19
Payer: COMMERCIAL

## 2020-08-19 ENCOUNTER — RESULT REVIEW (OUTPATIENT)
Age: 65
End: 2020-08-19

## 2020-08-19 ENCOUNTER — OUTPATIENT (OUTPATIENT)
Dept: OUTPATIENT SERVICES | Facility: HOSPITAL | Age: 65
LOS: 1 days | End: 2020-08-19

## 2020-08-19 DIAGNOSIS — Z98.84 BARIATRIC SURGERY STATUS: Chronic | ICD-10-CM

## 2020-08-19 DIAGNOSIS — Z98.890 OTHER SPECIFIED POSTPROCEDURAL STATES: Chronic | ICD-10-CM

## 2020-08-19 DIAGNOSIS — C54.1 MALIGNANT NEOPLASM OF ENDOMETRIUM: ICD-10-CM

## 2020-08-19 DIAGNOSIS — M11.20 OTHER CHONDROCALCINOSIS, UNSPECIFIED SITE: Chronic | ICD-10-CM

## 2020-08-19 DIAGNOSIS — Z94.4 LIVER TRANSPLANT STATUS: Chronic | ICD-10-CM

## 2020-08-19 DIAGNOSIS — Z95.828 PRESENCE OF OTHER VASCULAR IMPLANTS AND GRAFTS: Chronic | ICD-10-CM

## 2020-08-19 PROCEDURE — 93970 EXTREMITY STUDY: CPT | Mod: 26

## 2020-08-19 PROCEDURE — 78815 PET IMAGE W/CT SKULL-THIGH: CPT | Mod: 26,PI

## 2020-08-21 ENCOUNTER — APPOINTMENT (OUTPATIENT)
Dept: GYNECOLOGIC ONCOLOGY | Facility: CLINIC | Age: 65
End: 2020-08-21
Payer: COMMERCIAL

## 2020-08-21 VITALS — HEIGHT: 65.5 IN | WEIGHT: 250 LBS | BODY MASS INDEX: 41.15 KG/M2

## 2020-08-21 PROCEDURE — 99214 OFFICE O/P EST MOD 30 MIN: CPT

## 2020-08-24 ENCOUNTER — RX RENEWAL (OUTPATIENT)
Age: 65
End: 2020-08-24

## 2020-08-26 NOTE — REASON FOR VISIT
[FreeTextEntry1] : [facility]\par \par [facility] [phone] at [address] [place] [id number]\par

## 2020-08-26 NOTE — END OF VISIT
[FreeTextEntry3] : Written by Helen Curry, acting as a scribe for Dr. Rogers Paredes.\par This note accurately reflects the work and decisions made by me\par

## 2020-08-26 NOTE — PHYSICAL EXAM
[Normal] : No focal neurologic defects observed [de-identified] : Helen Curry MA was present the entire time of results and discussion

## 2020-08-26 NOTE — HISTORY OF PRESENT ILLNESS
[FreeTextEntry1] : 66 y/o with newly diagnosed endometrial adenocarcinoma s/p EMB on 7/29/20 that revealed FIGO grade 2 endometrioid adenocarcinoma, MSI intact. She was seen on 8/12/2020 for a consultation. On exam she had a uterine prolapse. She has a history of liver transplant in 2004 with abdominal mesh in place. She is on Eliquis for A-fib. Patient returns today to review pet/ct and lower extremity dopplers. \par \par Pet/ct 8/19/2020- endometrial FDG avidity consistent with known malignancy. FDG avidity of the terminal ileum which is nonspecific and possibly physiologic. Additional difficult to delineate focal FDG avidity hepatic flexure. FDG avid 0.7 cm right intraparotid soft tissue nodule is indeterminate and may represent benign or malignant entities. Enlarged left thyroid lobe with small calcifications.\par US DPLX LWR EXT doppler no evidence of DVT.\par \par Mammogram 11/2019, scattered fibroglandular elements, otherwise normal.

## 2020-08-26 NOTE — ASSESSMENT
[FreeTextEntry1] : I discussed at length with the patient the nature, purpose, risks, benefits, and alternatives of total vaginal hysterectomy and bilateral salpingo-oophorectomy.  The patient understands the risks to include (but not be limited to) bowel injury, bleeding (with the possible need for transfusion), bladder or ureteral injury, infections, protracted wound closure, deep venous thrombosis, and milla-operative death.  She is also aware of  the possibility of  a unrecognized surgical complication with need for subsequent re-exploration.  She agrees to proceed.  She asked numerous questions which were answered to her satisfaction.  \par \par I discussed with patient that sentinel lymph node dissection is routinely part of the staging process. Given her uterine prolapse and recent pet/ct that was reassuring I believe the risk of lymph node metastases is low. I feel confident with proceeding with a vaginal approach.

## 2020-08-27 ENCOUNTER — APPOINTMENT (OUTPATIENT)
Dept: HEPATOLOGY | Facility: CLINIC | Age: 65
End: 2020-08-27
Payer: COMMERCIAL

## 2020-08-27 ENCOUNTER — LABORATORY RESULT (OUTPATIENT)
Age: 65
End: 2020-08-27

## 2020-08-27 VITALS
HEIGHT: 65.5 IN | TEMPERATURE: 97.5 F | BODY MASS INDEX: 38.19 KG/M2 | RESPIRATION RATE: 15 BRPM | HEART RATE: 101 BPM | SYSTOLIC BLOOD PRESSURE: 169 MMHG | DIASTOLIC BLOOD PRESSURE: 99 MMHG | WEIGHT: 232 LBS | OXYGEN SATURATION: 100 %

## 2020-08-27 DIAGNOSIS — L98.9 DISORDER OF THE SKIN AND SUBCUTANEOUS TISSUE, UNSPECIFIED: ICD-10-CM

## 2020-08-27 PROCEDURE — 99215 OFFICE O/P EST HI 40 MIN: CPT

## 2020-08-27 RX ORDER — METOPROLOL TARTRATE 50 MG/1
50 TABLET ORAL
Refills: 0 | Status: DISCONTINUED | COMMUNITY
End: 2020-08-27

## 2020-08-27 NOTE — ASSESSMENT
[FreeTextEntry1] : # Post-DDLT with alloimmune hepatitis:\par - Had minimal fibrosis on liver biopsy in 11/2018.\par - Planned for FibroScan for routine surveillance, delayed due to COVID-19 pandemic, and will obtain in coming weeks (likely after hysterectomy).\par - Liver enzymes remain elevated (mainly ALP and GGT, with minimal AST elevation), but were stable over the past year on last labs and graft function remained preserved. Will repeat labs today.\par - Continue prednisone 5 mg po bid.\par - Continue tacrolimus, currently 2 mg po q12h; will adjust by trough level with goal of ~4-6 ng/mL.\par - Off MMF due to multiple infections requiring admission in 2019, will continue to hold for now.\par \par # Pre- and milla-operative recommendations:\par - No contraindication to proceeding with hysterectomy from hepatology standpoint given stable hepatic allograft function, though will follow-up labs today.\par - She should continue her home immunosuppression regimen.\par - Many antibiotics and other commonly used medications have drug-drug interactions with tacrolimus; encourage surgical team to reach out to myself or transplant pharmacist Umer Blakely with any questions or concerns.\par - Prefer to avoid NSAIDs given her chronic anticoagulation as well as risk of ADAL given chronic CNI use.\par \par # Right hand 3rd MCP skin lesion:\par - Appearance more suggestive of insect bite than cellulitis, but given her immunocompromised state as well as multiple infections in the past year, will have low threshold to start antibiotics or refer to Transplant ID for evaluation if worsening or if any signs of infection based on labs being done today.\par \par # Ventral hernia with mesh: Palpable mesh in epigastrium from prior hernia repair causes her some discomfort, and plan will be for eventual re-evaluation by her transplant surgeon Dr. Smita Lord to be coordinated with a future visit, but we will defer this until after her hysterectomy.\par \par # Peripheral edema:\par - Most likely secondary to CHF as well as chronic venous stasis.\par - Previously advised compression stockings.\par - Uses furosemide at 20-40 mg po daily on prn basis.\par - No proteinuria on prior urinalysis to suggest nephrotic syndrome, but repeat urinalysis and urine protein/Cr ratio ordered today, especially given her report of darkened urine recently.\par - Will arrange Cardiology evaluation as per patient request, also needed for clearance prior to hysterectomy.\par \par # Post-LT health maintenance:\par - BP: Elevated, above goal of <130/80 mmHg. Needs increased antihypertensive medications, will discuss with Cardiology. Would consider addition of nifedipine (vs increased dosage of metoprolol).\par - Glycemic control: No post-transplant DM. Will re-check HbA1c with labs today.\par - Lipids: Previously at goal when checked on 11/27/19, will re-check with labs today. Goal LDL <100 mg/dL and goal Tg <250 mg/dL.\par - BMI: Morbidly obese with BMI 38.0 kg/m2, and will continue to encourage gradual weight loss with diet and exercise as tolerated.\par - Renal function: Stable on last labs, will continue to trend.\par - Bone health: DEXA ordered today. Continue vitamin D supplementation and will check vitamin D 25OH with labs today.\par - Dermatology: She was advised to use sun protection measures daily (sunscreen, hat, and long sleeves) and to continue q6 month follow-up with her dermatologist (overdue) for full skin evaluation and given her history of squamous cells cancers, as well as recent skin changes that she has noted.\par - Cancer screening/surveillance: Overdue for repeat EGD for gastric surveillance (due to focal low and high grade dysplasia), was due in 8/2019. Overdue for repeat colonoscopy (due to prior suboptimal bowel prep), was due in 12/2019. Advised to reach out again to her GI DrFrantz Bangura to try to have these scheduled, though can defer until after her hysterectomy. Last MMG 11/2019 with scattered fibroglandular elements, otherwise normal.\par - Vaccinations: Will address at future visit.\par \par She will return for follow-up in 2 months.

## 2020-08-27 NOTE — CONSULT LETTER
[Dear  ___] : Dear  [unfilled], [Courtesy Letter:] : I had the pleasure of seeing your patient, [unfilled], in my office today. [Please see my note below.] : Please see my note below. [FreeTextEntry2] : Dr. Silvia Nash [FreeTextEntry3] : Sincerely,\par \par Kezia Foster M.D., Ph.D.\par  of Medicine\par [facility]\par [address] [place] [address]\[place] [address]\par Tel: [phone]\par Fax: [phone]\par Cell: [phone]\par E-mail: [email or web address]\par

## 2020-08-27 NOTE — REVIEW OF SYSTEMS
[Lower Ext Edema] : lower extremity edema [Arthralgias] : arthralgias [Negative] : Heme/Lymph [Feeling Tired] : feeling tired [SOB on Exertion] : shortness of breath during exertion [As Noted in HPI] : as noted in HPI [Skin Lesions] : skin lesion

## 2020-08-27 NOTE — HISTORY OF PRESENT ILLNESS
[de-identified] : Ms. Crisostomo is a 66 yo  F with AIH (diagnosed at age 29) with cirrhosis, s/p DDLT at [facility] (Dr. Smita Lord) in 2004, now with alloimmune hepatitis (with percutaneous liver biopsy on 11/6/18 that showed alloimmune hepatitis with no fibrosis), who is being seen for post-liver transplant follow-up.\par \par Her post-transplant medical history is also notable for: morbid obesity, a history of DVTs (s/p IVC filter placement), chronic Afib (s/p prior cardioversions and ablation, on chronic anticoagulation with Eliquis), HFrEF (with TTE in 1/2018 with LVEF 46%, moderate diastolic dysfunction, moderate LVH, and severe LAE), excisions of squamous cell carcinomas of the skin (right arm 2016 and 2018, right neck 2013, and above the right lip 2018), gastric fundic gland polyps with focal high-grade vs low-grade dysplasia (on EGD 2018), left ankle septic arthritis vs pseudogout (hospitalized 6/21/19-7/10/19), right hand/wrist infection (hospitalized 9/4/19-9/13/19), and urosepsis (hospitalized 2/14/20-2/18/20).\par \par PCP: Dr. Silvia Nash\par Cardiology (EP): Dr. Doron Byrd\par GI: Dr. Hoa Bangura\par Gynecology: Dr. Lubna Kennedy\par Gynecology Oncology: Dr. Rogers Paredes\par \par She was last seen in this office on 2/28/20 and subsequently also had a phone check-in with me on 3/27/20 due to the COVID-19 pandemic.\par \par Due to postmenopausal vaginal bleeding, she underwent EMB on 7/29/20 that revealed endometrial adenocarcinoma, and she is now tentatively scheduled to undergo hysterectomy with Dr. Paredes on 9/8/20. PET/CT done on 8/19/20 showed FDG avidity of the terminal ileum, hepatic flexure, and right intraparotid soft tissue nodule, of unclear significance. She is scheduled to get an US of the parotid gland.\par \par She is feeling anxious about getting all of her pre-operative clearances done in time for her surgery, particularly her cardiology evaluation as she has been unable to schedule an appointment with a general cardiologist at [facility] despite reaching out recently to her EP Dr. Byrd.\par \par She has not had any labs done since 2/2020 because of the COVID-19 pandemic, as she was very fearful about going to any medical offices or laboratory facilities until just recently. She has been taking her immunosuppression as prescribed (tacrolimus 2 mg po q12h and prednisone 5 mg po bid), but has been only using furosemide sparingly on a PRN basis because she is worried about her kidney function. She has had significant BLE swelling recently but no orthopnea. She has chronic exertional dyspnea.\par \par She has had some redness, swelling, and mild pain of her right hand 3rd MCP for the last 2 days. She thinks it might be an insect bite but has not been outdoors much recently. Her grandsons do play outdoors, though. She is a little worried that it might be the start of an infection given her prior hospitalizations last year, but it also is not nearly as painful as those were.\par \par She has also noticed darkening of her urine recently, but no dysuria.\par \par She has noticed some "spots coming back" on her face but has not seen her dermatologist for >6 months due to the COVID-19 pandemic.\par \par She reached out to GI Dr. Hoa Bangura's office recently re: repeating her EGD and colonoscopy (overdue for both), but has not heard back about scheduling yet and prefers to wait until after her hysterectomy.\par \par In addition to her listed prescribed medications, she has been taking "Balance of Nature" fruits and vegetables supplements (3 pills twice daily) for a long time, >1 year, and feels that they help her hair and nails as well as overall energy level.

## 2020-08-28 ENCOUNTER — NON-APPOINTMENT (OUTPATIENT)
Age: 65
End: 2020-08-28

## 2020-08-28 ENCOUNTER — OUTPATIENT (OUTPATIENT)
Dept: OUTPATIENT SERVICES | Facility: HOSPITAL | Age: 65
LOS: 1 days | End: 2020-08-28
Payer: COMMERCIAL

## 2020-08-28 ENCOUNTER — RESULT REVIEW (OUTPATIENT)
Age: 65
End: 2020-08-28

## 2020-08-28 ENCOUNTER — APPOINTMENT (OUTPATIENT)
Dept: CARDIOLOGY | Facility: CLINIC | Age: 65
End: 2020-08-28
Payer: COMMERCIAL

## 2020-08-28 VITALS
DIASTOLIC BLOOD PRESSURE: 100 MMHG | BODY MASS INDEX: 38.35 KG/M2 | SYSTOLIC BLOOD PRESSURE: 163 MMHG | OXYGEN SATURATION: 93 % | WEIGHT: 234 LBS | HEART RATE: 82 BPM | TEMPERATURE: 96.7 F

## 2020-08-28 VITALS — DIASTOLIC BLOOD PRESSURE: 80 MMHG | SYSTOLIC BLOOD PRESSURE: 130 MMHG

## 2020-08-28 DIAGNOSIS — Z98.84 BARIATRIC SURGERY STATUS: Chronic | ICD-10-CM

## 2020-08-28 DIAGNOSIS — C54.1 MALIGNANT NEOPLASM OF ENDOMETRIUM: ICD-10-CM

## 2020-08-28 DIAGNOSIS — Z94.4 LIVER TRANSPLANT STATUS: Chronic | ICD-10-CM

## 2020-08-28 DIAGNOSIS — Z98.890 OTHER SPECIFIED POSTPROCEDURAL STATES: Chronic | ICD-10-CM

## 2020-08-28 DIAGNOSIS — M11.20 OTHER CHONDROCALCINOSIS, UNSPECIFIED SITE: Chronic | ICD-10-CM

## 2020-08-28 DIAGNOSIS — Z95.828 PRESENCE OF OTHER VASCULAR IMPLANTS AND GRAFTS: Chronic | ICD-10-CM

## 2020-08-28 LAB
25(OH)D3 SERPL-MCNC: 27.6 NG/ML
ALBUMIN SERPL ELPH-MCNC: 3.4 G/DL
ALP BLD-CCNC: 334 U/L
ALT SERPL-CCNC: 37 U/L
ANION GAP SERPL CALC-SCNC: 16 MMOL/L
APPEARANCE: ABNORMAL
APTT BLD: 32.2 SEC
AST SERPL-CCNC: 49 U/L
BACTERIA: ABNORMAL
BILIRUB DIRECT SERPL-MCNC: 0.5 MG/DL
BILIRUB SERPL-MCNC: 1.4 MG/DL
BILIRUBIN URINE: NEGATIVE
BLOOD URINE: NEGATIVE
BUN SERPL-MCNC: 13 MG/DL
CALCIUM SERPL-MCNC: 9.3 MG/DL
CHLORIDE SERPL-SCNC: 105 MMOL/L
CHOLEST SERPL-MCNC: 159 MG/DL
CHOLEST/HDLC SERPL: 2.3 RATIO
CO2 SERPL-SCNC: 19 MMOL/L
COLOR: YELLOW
CREAT SERPL-MCNC: 0.69 MG/DL
CREAT SPEC-SCNC: 79 MG/DL
CREAT/PROT UR: 0.3 RATIO
ESTIMATED AVERAGE GLUCOSE: 148 MG/DL
FERRITIN SERPL-MCNC: 68 NG/ML
GGT SERPL-CCNC: 378 U/L
GLUCOSE QUALITATIVE U: NEGATIVE
GLUCOSE SERPL-MCNC: 90 MG/DL
HBA1C MFR BLD HPLC: 6.8 %
HDLC SERPL-MCNC: 69 MG/DL
HYALINE CASTS: 0 /LPF
IGG SER QL IEP: 1701 MG/DL
INR PPP: 1.45 RATIO
IRON SATN MFR SERPL: 28 %
IRON SERPL-MCNC: 77 UG/DL
KETONES URINE: NEGATIVE
LDLC SERPL CALC-MCNC: 75 MG/DL
LEUKOCYTE ESTERASE URINE: ABNORMAL
MAGNESIUM SERPL-MCNC: 1.5 MG/DL
MICROSCOPIC-UA: NORMAL
NITRITE URINE: NEGATIVE
PH URINE: 7
PHOSPHATE SERPL-MCNC: 2.6 MG/DL
POTASSIUM SERPL-SCNC: 4.4 MMOL/L
PROT SERPL-MCNC: 6.3 G/DL
PROT UR-MCNC: 23 MG/DL
PROTEIN URINE: NORMAL
PT BLD: 16.8 SEC
RED BLOOD CELLS URINE: 1 /HPF
SODIUM SERPL-SCNC: 140 MMOL/L
SPECIFIC GRAVITY URINE: 1.02
SQUAMOUS EPITHELIAL CELLS: 3 /HPF
TACROLIMUS SERPL-MCNC: 4.3 NG/ML
TIBC SERPL-MCNC: 278 UG/DL
TRIGL SERPL-MCNC: 78 MG/DL
TSH SERPL-ACNC: 0.04 UIU/ML
UIBC SERPL-MCNC: 201 UG/DL
UROBILINOGEN URINE: ABNORMAL
WHITE BLOOD CELLS URINE: 13 /HPF

## 2020-08-28 PROCEDURE — 99205 OFFICE O/P NEW HI 60 MIN: CPT

## 2020-08-28 PROCEDURE — 93000 ELECTROCARDIOGRAM COMPLETE: CPT

## 2020-08-28 PROCEDURE — 88321 CONSLTJ&REPRT SLD PREP ELSWR: CPT

## 2020-08-29 DIAGNOSIS — Z01.818 ENCOUNTER FOR OTHER PREPROCEDURAL EXAMINATION: ICD-10-CM

## 2020-08-31 ENCOUNTER — OUTPATIENT (OUTPATIENT)
Dept: OUTPATIENT SERVICES | Facility: HOSPITAL | Age: 65
LOS: 1 days | End: 2020-08-31
Payer: COMMERCIAL

## 2020-08-31 VITALS
HEIGHT: 65 IN | DIASTOLIC BLOOD PRESSURE: 100 MMHG | RESPIRATION RATE: 16 BRPM | WEIGHT: 233.91 LBS | SYSTOLIC BLOOD PRESSURE: 147 MMHG | HEART RATE: 63 BPM | TEMPERATURE: 97 F

## 2020-08-31 DIAGNOSIS — Z98.890 OTHER SPECIFIED POSTPROCEDURAL STATES: Chronic | ICD-10-CM

## 2020-08-31 DIAGNOSIS — M11.20 OTHER CHONDROCALCINOSIS, UNSPECIFIED SITE: Chronic | ICD-10-CM

## 2020-08-31 DIAGNOSIS — Z98.84 BARIATRIC SURGERY STATUS: Chronic | ICD-10-CM

## 2020-08-31 DIAGNOSIS — Z87.798 PERSONAL HISTORY OF OTHER (CORRECTED) CONGENITAL MALFORMATIONS: Chronic | ICD-10-CM

## 2020-08-31 DIAGNOSIS — Z01.818 ENCOUNTER FOR OTHER PREPROCEDURAL EXAMINATION: ICD-10-CM

## 2020-08-31 DIAGNOSIS — I48.91 UNSPECIFIED ATRIAL FIBRILLATION: ICD-10-CM

## 2020-08-31 DIAGNOSIS — Z95.828 PRESENCE OF OTHER VASCULAR IMPLANTS AND GRAFTS: Chronic | ICD-10-CM

## 2020-08-31 DIAGNOSIS — Z94.4 LIVER TRANSPLANT STATUS: ICD-10-CM

## 2020-08-31 DIAGNOSIS — Z29.9 ENCOUNTER FOR PROPHYLACTIC MEASURES, UNSPECIFIED: ICD-10-CM

## 2020-08-31 DIAGNOSIS — I10 ESSENTIAL (PRIMARY) HYPERTENSION: ICD-10-CM

## 2020-08-31 DIAGNOSIS — Z94.4 LIVER TRANSPLANT STATUS: Chronic | ICD-10-CM

## 2020-08-31 DIAGNOSIS — C54.1 MALIGNANT NEOPLASM OF ENDOMETRIUM: ICD-10-CM

## 2020-08-31 LAB — BLD GP AB SCN SERPL QL: SIGNIFICANT CHANGE UP

## 2020-08-31 PROCEDURE — G0463: CPT

## 2020-08-31 NOTE — PATIENT PROFILE ADULT - [id number]
individual instruction/verbal instruction/Pre op teaching surgical scrub pain management instructions given to pt    Covid swab to be done Sept 5/written material

## 2020-08-31 NOTE — H&P PST ADULT - ASSESSMENT
CAPRINI SCORE [CLOT]    AGE RELATED RISK FACTORS                                                       MOBILITY RELATED FACTORS  [ ] Age 41-60 years                                            (1 Point)                  [ ] Bed rest                                                        (1 Point)  [x] Age: 61-74 years                                           (2 Points)                 [ ] Plaster cast                                                   (2 Points)  [ ] Age= 75 years                                              (3 Points)                 [ ] Bed bound for more than 72 hours                 (2 Points)    DISEASE RELATED RISK FACTORS                                               GENDER SPECIFIC FACTORS  [x] Edema in the lower extremities                       (1 Point)                  [ ] Pregnancy                                                     (1 Point)  [ x] Varicose veins                                               (1 Point)                  [ ] Post-partum < 6 weeks                                   (1 Point)             [x ] BMI > 25 Kg/m2                                            (1 Point)                  [ ] Hormonal therapy  or oral contraception          (1 Point)                 [ ] Sepsis (in the previous month)                        (1 Point)                  [ ] History of pregnancy complications                 (1 point)  [ ] Pneumonia or serious lung disease                                               [ ] Unexplained or recurrent                     (1 Point)           (in the previous month)                               (1 Point)  [ ] Abnormal pulmonary function test                     (1 Point)                 SURGERY RELATED RISK FACTORS  [ ] Acute myocardial infarction                              (1 Point)                 [ ]  Section                                             (1 Point)  [ ] Congestive heart failure (in the previous month)  (1 Point)               [ ] Minor surgery                                                  (1 Point)   [ ] Inflammatory bowel disease                             (1 Point)                 [ ] Arthroscopic surgery                                        (2 Points)  [ ] Central venous access                                      (2 Points)                [x ] General surgery lasting more than 45 minutes   (2 Points)       [ ] Stroke (in the previous month)                          (5 Points)               [ ] Elective arthroplasty                                         (5 Points)            [ x] Malignancy                                                        (2 points)                                                                                                                                  HEMATOLOGY RELATED FACTORS                                                 TRAUMA RELATED RISK FACTORS  [x ] Prior episodes of VTE                                     (3 Points)                [ ] Fracture of the hip, pelvis, or leg                       (5 Points)  [ ] Positive family history for VTE                         (3 Points)                 [ ] Acute spinal cord injury (in the previous month)  (5 Points)  [ ] Prothrombin 12376 A                                     (3 Points)                 [ ] Paralysis  (less than 1 month)                             (5 Points)  [ ] Factor V Leiden                                             (3 Points)                  [ ] Multiple Trauma within 1 month                        (5 Points)  [ ] Lupus anticoagulants                                     (3 Points)                                                           [ ] Anticardiolipin antibodies                               (3 Points)                                                       [ ] High homocysteine in the blood                      (3 Points)                                             [ ] Other congenital or acquired thrombophilia      (3 Points)                                                [ ] Heparin induced thrombocytopenia                  (3 Points)                                          Total Score [  12   ]    Caprini Score 0 - 2:  Low Risk, No VTE Prophylaxis required for most patients, encourage ambulation  Caprini Score 3 - 6:  At Risk, pharmacologic VTE prophylaxis is indicated for most patients (in the absence of a contraindication)  Caprini Score Greater than or = 7:  High Risk, pharmacologic VTE prophylaxis is indicated for most patients (in the absence of a contraindication)    65 year old female with h/o A fib on Eliquis, autoimmune hepatitis with cirrhosis ( dx at age 29) s/p liver transplant in  present to PST with c/o newly diagnosed endometrial adenocarcinoma s/p EMB on 20 that revealed FIGO grade 2 endometrioid adenocarcinoma.  She is now schedule for total vaginal hysterectomy bilateral salpingo oophorectomy, cystoscopy.

## 2020-08-31 NOTE — H&P PST ADULT - NSICDXPASTSURGICALHX_GEN_ALL_CORE_FT
PAST SURGICAL HISTORY:  [place] filter in place     H/O laparoscopic adjustable gastric banding 10 years ago    H/O prior ablation treatment 1/2018    H/O wrist surgery     Pseudogout left shoulder and elbow    S/P liver transplant 9/2007    S/P shoulder surgery debribdredment FEb 2017 PAST SURGICAL HISTORY:  [place] filter in place     H/O laparoscopic adjustable gastric banding 10 years ago    H/O prior ablation treatment 1/2018    H/O wrist surgery     Pseudogout left shoulder and elbow    S/P liver transplant 9/2007    S/P shoulder surgery debribdredment FEb 2017 PAST SURGICAL HISTORY:  [place] filter in place     H/O laparoscopic adjustable gastric banding 10 years ago    H/O prior ablation treatment 1/2018    H/O wrist surgery     History of thyroglossal duct cyst removal     Pseudogout left shoulder and elbow    S/P liver transplant 9/2007    S/P shoulder surgery debribdredment FEb 2017

## 2020-08-31 NOTE — PATIENT PROFILE ADULT - NSPREOP1_ABLETOREACHPT_GEN_A_NUR
[phone]    Denies domestic or international travel in the past 3 weeks yes/[phone]    Denies domestic or international travel in the past 3 weeks

## 2020-08-31 NOTE — H&P PST ADULT - NSICDXPROBLEM_GEN_ALL_CORE_FT
PROBLEM DIAGNOSES  Problem: Need for prophylactic measure  Assessment and Plan: high risk, caprini score 12,  the surgical team will order appropriate VTE prophylaxis     Problem: HTN (hypertension)  Assessment and Plan: routine labs and ekg  continue medication as directed   medical clearance     Problem: Afib  Assessment and Plan: cardiac clearance  hold Eliquis as directed by cardiology    Problem: History of liver transplant  Assessment and Plan: Hep. clearance obtained  and in chart     Problem: Endometrial cancer  Assessment and Plan:  total vaginal hysterectomy bilateral salpingo oophorectomy, cystoscopy.

## 2020-08-31 NOTE — H&P PST ADULT - NSICDXPASTMEDICALHX_GEN_ALL_CORE_FT
PAST MEDICAL HISTORY:  Afib     Autoimmune hepatitis     DVT (deep venous thrombosis) LLE    Edema     GERD (gastroesophageal reflux disease)     HLD (hyperlipidemia)     HTN (hypertension)     Liver disease     Lymph edema b/l legs    Scoliosis     Squamous cell skin cancer neck face arms PAST MEDICAL HISTORY:  Afib     Autoimmune hepatitis diagnosied    DVT (deep venous thrombosis) LLE    Edema     GERD (gastroesophageal reflux disease)     HLD (hyperlipidemia)     HTN (hypertension)     Liver disease     Lymphedema of leg     Scoliosis     Squamous cell skin cancer neck face arms PAST MEDICAL HISTORY:  Afib     Anemia     Autoimmune hepatitis diagnosed age 29    DVT (deep venous thrombosis) LLE    Edema     GERD (gastroesophageal reflux disease)     History of pseudogout     HLD (hyperlipidemia)     HTN (hypertension)     Liver disease     Lymphedema of leg     Scoliosis     Squamous cell skin cancer neck face arms

## 2020-08-31 NOTE — H&P PST ADULT - RS GEN PE MLT RESP DETAILS PC
airway patent/clear to auscultation bilaterally/respirations non-labored/breath sounds equal/good air movement/no chest wall tenderness

## 2020-08-31 NOTE — H&P PST ADULT - MUSCULOSKELETAL COMMENTS
legs  are hevey legs  are heavy, walks with cane moving all extremities  with good strength legs  are heavy make walking difficult, walks with cane

## 2020-08-31 NOTE — H&P PST ADULT - HISTORY OF PRESENT ILLNESS
65 year old female with h/o A fib on Eliquis, autoimmune hepatitis with cirrhosis ( dx at age 29) s/p liver transplant in 2004 present to PST with c/o newly diagnosed endometrial adenocarcinoma s/p EMB on 7/29/20 that revealed FIGO grade 2 endometrioid adenocarcinoma, MSI intact.  Patient state she has a prolapse  uterus and occasionally she would notice spotting once a month.  She went to the GYN had a  pap smear which was noted with abnormal cells as well. She state she also had EMB, pet/ct and lower extremity dopplers.  She is now schedule for total vaginal hysterectomy bilateral salpingo oophorectomy, cystoscopy.     Pet/ct 8/19/2020- endometrial FDG avidity consistent with known malignancy. FDG avidity of the terminal ileum which is nonspecific and possibly physiologic. Additional difficult to delineate focal FDG avidity hepatic flexure. FDG avid 0.7 cm right intraparotid soft tissue nodule is indeterminate and may represent benign or malignant entities. Enlarged left thyroid lobe with small calcifications.  US DPLX LWR EXT doppler no evidence of DVT.

## 2020-09-01 ENCOUNTER — APPOINTMENT (OUTPATIENT)
Dept: CARDIOLOGY | Facility: CLINIC | Age: 65
End: 2020-09-01
Payer: COMMERCIAL

## 2020-09-01 LAB
BACTERIA BLD CULT: NORMAL
BASOPHILS # BLD AUTO: 0.05 K/UL
BASOPHILS NFR BLD AUTO: 0.4 %
EOSINOPHIL # BLD AUTO: 0.15 K/UL
EOSINOPHIL NFR BLD AUTO: 1.2 %
HCT VFR BLD CALC: 43.8 %
HGB BLD-MCNC: 13.3 G/DL
IMM GRANULOCYTES NFR BLD AUTO: 0.3 %
LYMPHOCYTES # BLD AUTO: 3.41 K/UL
LYMPHOCYTES NFR BLD AUTO: 26.6 %
MAN DIFF?: NORMAL
MCHC RBC-ENTMCNC: 27.6 PG
MCHC RBC-ENTMCNC: 30.4 GM/DL
MCV RBC AUTO: 90.9 FL
MONOCYTES # BLD AUTO: 0.89 K/UL
MONOCYTES NFR BLD AUTO: 6.9 %
NEUTROPHILS # BLD AUTO: 8.29 K/UL
NEUTROPHILS NFR BLD AUTO: 64.6 %
PLATELET # BLD AUTO: 310 K/UL
RBC # BLD: 4.82 M/UL
RBC # FLD: 16.8 %
WBC # FLD AUTO: 12.83 K/UL

## 2020-09-01 PROCEDURE — 93306 TTE W/DOPPLER COMPLETE: CPT

## 2020-09-03 ENCOUNTER — APPOINTMENT (OUTPATIENT)
Dept: FAMILY MEDICINE | Facility: CLINIC | Age: 65
End: 2020-09-03
Payer: COMMERCIAL

## 2020-09-03 VITALS
WEIGHT: 234 LBS | SYSTOLIC BLOOD PRESSURE: 134 MMHG | DIASTOLIC BLOOD PRESSURE: 80 MMHG | BODY MASS INDEX: 38.52 KG/M2 | OXYGEN SATURATION: 98 % | HEART RATE: 91 BPM | HEIGHT: 65.5 IN | TEMPERATURE: 96.4 F

## 2020-09-03 PROBLEM — M41.9 SCOLIOSIS, UNSPECIFIED: Chronic | Status: ACTIVE | Noted: 2020-08-31

## 2020-09-03 PROBLEM — Z87.39 PERSONAL HISTORY OF OTHER DISEASES OF THE MUSCULOSKELETAL SYSTEM AND CONNECTIVE TISSUE: Chronic | Status: ACTIVE | Noted: 2020-08-31

## 2020-09-03 PROBLEM — K75.4 AUTOIMMUNE HEPATITIS: Chronic | Status: ACTIVE | Noted: 2018-07-27

## 2020-09-03 PROBLEM — D64.9 ANEMIA, UNSPECIFIED: Chronic | Status: ACTIVE | Noted: 2020-08-31

## 2020-09-03 PROBLEM — C44.92 SQUAMOUS CELL CARCINOMA OF SKIN, UNSPECIFIED: Chronic | Status: ACTIVE | Noted: 2020-08-31

## 2020-09-03 PROCEDURE — 99214 OFFICE O/P EST MOD 30 MIN: CPT

## 2020-09-03 NOTE — DISCUSSION/SUMMARY
[FreeTextEntry1] : Patient is a 65 year-old liver transplant recipient with cardiovascular history as above including a history of heart failure with reduced ejection fraction, chronic LE edema, atrial fibrillation on anticoagulation, and DVT (after trauma) status post IVC filter. \par \par Patient is without acute coronary syndrome, decompensated heart failure, dangerous arrhythmia, or critical valvular stenosis. \par Will repeat echocardiogram to evaluate LV function and filling pressures.\par If LVEF seen to be reduced, would recommend initiating Entresto.\par In light of significant peripheral edema on exam, would continue furosemide for diuresis. Consider Vascular Cardiology evaluation for retrieval of IVC filter and/or evaluation of anasarca/lymphedema which could be obstructive (from filter, or from endometrial ca.\par \par Preop evaluation to be finalized after reviewing echocardiogram next week.

## 2020-09-03 NOTE — PHYSICAL EXAM
[Conjunctiva] : the conjunctiva were normal in both eyes [General Appearance - In No Acute Distress] : no acute distress [PERRL] : pupils were equal in size, round, and reactive to light [EOM Intact] : extraocular movements were intact [Normal] : the eyelids were normal bilaterally [Normal Oral Mucosa] : normal oral mucosa [No Oral Pallor] : no oral pallor [No Oral Cyanosis] : no oral cyanosis [Normal Oropharynx] : normal oropharynx [Normal Jugular Venous V Waves Present] : normal jugular venous V waves present [Respiration, Rhythm And Depth] : normal respiratory rhythm and effort [Exaggerated Use Of Accessory Muscles For Inspiration] : no accessory muscle use [Auscultation Breath Sounds / Voice Sounds] : lungs were clear to auscultation bilaterally [] : no respiratory distress [Not Palpable] : not palpable [Tachycardia] : tachycardic [Normal S1] : normal S1 [Irregularly Irregular] : irregularly irregular [Normal S2] : normal S2 [Anasarca] : anasarca edema present [No Murmur] : no murmurs heard [Abdomen Soft] : soft [Bowel Sounds] : normal bowel sounds [Nail Clubbing] : no clubbing of the fingernails [Abdomen Tenderness] : non-tender [Cyanosis, Localized] : no localized cyanosis [No Xanthoma] : no  xanthoma was observed [Skin Color & Pigmentation] : normal skin color and pigmentation [Impaired Insight] : insight and judgment were intact [Oriented To Time, Place, And Person] : oriented to person, place, and time [Affect] : the affect was normal [No Anxiety] : not feeling anxious [Mood] : the mood was normal [Yellow Sclera (Icteric)] : no scleral icterus was seen [FreeTextEntry1] : ambulates with cane for assist

## 2020-09-03 NOTE — HISTORY OF PRESENT ILLNESS
[Preoperative Visit] : for a medical evaluation prior to surgery [Date of Surgery ___] : on [unfilled] [Scheduled Procedure ___] : a [unfilled] [Surgeon Name ___] : surgeon: [unfilled] [Prior Anesthesia] : Prior anesthesia [Electrocardiogram] : ~T an ECG ~C was performed [Echocardiogram] : ~T an echocardiogram ~C was performed [Prev Anesthesia Reaction] : no previous anesthesia reaction [FreeTextEntry1] : Patient is a 65 year-old woman known history of acute autoimmune hepatitis at age 30 with cirrhosis, status post liver transplant at [facility] in 2004, known cardiovascular risk factors of morbid obesity, DVT status post IVC filter, atrial fibrillation status post DCCV and catheter ablations, maintained on anticoagulation with apixaban, history of heart failure with reduced ejection fraction, LVEF 46% on TTE in January 2018, maintained on beta-blocker and loop diuretic, but not on ACEi/ARB/ARNI, now presents for cardiovascular evaluation prior to hysterectomy after a biopsy showed endometrial adenocarcinoma.\par \par Patient sleeps with one pillow at night. She does not have paroxysmal nocturnal dyspnea.\par Chronic lower extremity edema vs. lymphedema.\par \par PMD: Silvia Nash DO and Nayan Lee MD [phone]\par EP: Doron Byrd MD [phone]\par GI: Hoa Bangura MD [phone]\par Hepatologist: Kezia Foster MD [phone]\par Gyn: Lubna Kennedy MD [phone]\par Gyn-Onc: Rogers Paredes MD [phone]

## 2020-09-05 ENCOUNTER — OUTPATIENT (OUTPATIENT)
Dept: OUTPATIENT SERVICES | Facility: HOSPITAL | Age: 65
LOS: 1 days | End: 2020-09-05
Payer: COMMERCIAL

## 2020-09-05 ENCOUNTER — APPOINTMENT (OUTPATIENT)
Dept: ULTRASOUND IMAGING | Facility: CLINIC | Age: 65
End: 2020-09-05
Payer: COMMERCIAL

## 2020-09-05 ENCOUNTER — APPOINTMENT (OUTPATIENT)
Dept: DISASTER EMERGENCY | Facility: CLINIC | Age: 65
End: 2020-09-05

## 2020-09-05 DIAGNOSIS — K11.8 OTHER DISEASES OF SALIVARY GLANDS: ICD-10-CM

## 2020-09-05 DIAGNOSIS — Z87.798 PERSONAL HISTORY OF OTHER (CORRECTED) CONGENITAL MALFORMATIONS: Chronic | ICD-10-CM

## 2020-09-05 DIAGNOSIS — Z98.890 OTHER SPECIFIED POSTPROCEDURAL STATES: Chronic | ICD-10-CM

## 2020-09-05 DIAGNOSIS — Z94.4 LIVER TRANSPLANT STATUS: Chronic | ICD-10-CM

## 2020-09-05 DIAGNOSIS — Z00.8 ENCOUNTER FOR OTHER GENERAL EXAMINATION: ICD-10-CM

## 2020-09-05 DIAGNOSIS — Z98.84 BARIATRIC SURGERY STATUS: Chronic | ICD-10-CM

## 2020-09-05 DIAGNOSIS — M11.20 OTHER CHONDROCALCINOSIS, UNSPECIFIED SITE: Chronic | ICD-10-CM

## 2020-09-05 DIAGNOSIS — Z95.828 PRESENCE OF OTHER VASCULAR IMPLANTS AND GRAFTS: Chronic | ICD-10-CM

## 2020-09-05 LAB — SARS-COV-2 N GENE NPH QL NAA+PROBE: NOT DETECTED

## 2020-09-05 PROCEDURE — 76536 US EXAM OF HEAD AND NECK: CPT

## 2020-09-05 PROCEDURE — 76536 US EXAM OF HEAD AND NECK: CPT | Mod: 26

## 2020-09-07 NOTE — PHYSICAL EXAM
[No Carotid Bruits] : no carotid bruits [Normal] : no respiratory distress, lungs were clear to auscultation bilaterally and no accessory muscle use [Normal Affect] : the affect was normal [Alert and Oriented x3] : oriented to person, place, and time [Coordination Grossly Intact] : coordination grossly intact [Normal Insight/Judgement] : insight and judgment were intact

## 2020-09-07 NOTE — HISTORY OF PRESENT ILLNESS
[Atrial Fibrillation] : atrial fibrillation [Coronary Artery Disease] : coronary artery disease [No Adverse Anesthesia Reaction] : no adverse anesthesia reaction in self or family member [Chronic Anticoagulation] : chronic anticoagulation [(Patient denies any chest pain, claudication, dyspnea on exertion, orthopnea, palpitations or syncope)] : Patient denies any chest pain, claudication, dyspnea on exertion, orthopnea, palpitations or syncope [Aortic Stenosis] : no aortic stenosis [Recent Myocardial Infarction] : no recent myocardial infarction [Implantable Device/Pacemaker] : no implantable device/pacemaker [Chronic Kidney Disease] : no chronic kidney disease [Diabetes] : no diabetes [FreeTextEntry1] : hysterectomy [FreeTextEntry2] : 9/8/2020 [FreeTextEntry3] : Dr. Paredes [FreeTextEntry4] : Patient has PST's done 8/31/2020 [FreeTextEntry5] : sSEE CARDIOLOGY NOTE,  [FreeTextEntry7] : SEE CARDIOLOGY NOTE

## 2020-09-07 NOTE — ASSESSMENT
[Patient Optimized for Surgery] : Patient optimized for surgery [FreeTextEntry4] : PT HAS BEEN CLEARED BY CARDIOLOGY AND GI

## 2020-09-08 ENCOUNTER — RESULT REVIEW (OUTPATIENT)
Age: 65
End: 2020-09-08

## 2020-09-08 ENCOUNTER — TRANSCRIPTION ENCOUNTER (OUTPATIENT)
Age: 65
End: 2020-09-08

## 2020-09-08 ENCOUNTER — INPATIENT (INPATIENT)
Facility: HOSPITAL | Age: 65
LOS: 0 days | Discharge: HOME | DRG: 740 | End: 2020-09-09
Attending: OBSTETRICS & GYNECOLOGY | Admitting: OBSTETRICS & GYNECOLOGY
Payer: COMMERCIAL

## 2020-09-08 VITALS
TEMPERATURE: 97 F | WEIGHT: 233.91 LBS | RESPIRATION RATE: 16 BRPM | DIASTOLIC BLOOD PRESSURE: 77 MMHG | OXYGEN SATURATION: 97 % | HEIGHT: 65 IN | SYSTOLIC BLOOD PRESSURE: 127 MMHG | HEART RATE: 71 BPM

## 2020-09-08 DIAGNOSIS — Z98.890 OTHER SPECIFIED POSTPROCEDURAL STATES: Chronic | ICD-10-CM

## 2020-09-08 DIAGNOSIS — Z98.84 BARIATRIC SURGERY STATUS: Chronic | ICD-10-CM

## 2020-09-08 DIAGNOSIS — C54.1 MALIGNANT NEOPLASM OF ENDOMETRIUM: ICD-10-CM

## 2020-09-08 DIAGNOSIS — M11.20 OTHER CHONDROCALCINOSIS, UNSPECIFIED SITE: Chronic | ICD-10-CM

## 2020-09-08 DIAGNOSIS — Z94.4 LIVER TRANSPLANT STATUS: Chronic | ICD-10-CM

## 2020-09-08 DIAGNOSIS — Z95.828 PRESENCE OF OTHER VASCULAR IMPLANTS AND GRAFTS: Chronic | ICD-10-CM

## 2020-09-08 DIAGNOSIS — Z87.798 PERSONAL HISTORY OF OTHER (CORRECTED) CONGENITAL MALFORMATIONS: Chronic | ICD-10-CM

## 2020-09-08 LAB
BLD GP AB SCN SERPL QL: SIGNIFICANT CHANGE UP
GLUCOSE BLDC GLUCOMTR-MCNC: 101 MG/DL — HIGH (ref 70–99)
GLUCOSE BLDC GLUCOMTR-MCNC: 108 MG/DL — HIGH (ref 70–99)
GLUCOSE BLDC GLUCOMTR-MCNC: 134 MG/DL — HIGH (ref 70–99)

## 2020-09-08 PROCEDURE — 88309 TISSUE EXAM BY PATHOLOGIST: CPT | Mod: 26

## 2020-09-08 PROCEDURE — 58262 VAG HYST INCLUDING T/O: CPT

## 2020-09-08 PROCEDURE — 88305 TISSUE EXAM BY PATHOLOGIST: CPT | Mod: 26

## 2020-09-08 PROCEDURE — 58262 VAG HYST INCLUDING T/O: CPT | Mod: 80

## 2020-09-08 RX ORDER — ENOXAPARIN SODIUM 100 MG/ML
40 INJECTION SUBCUTANEOUS DAILY
Refills: 0 | Status: DISCONTINUED | OUTPATIENT
Start: 2020-09-08 | End: 2020-09-09

## 2020-09-08 RX ORDER — HYDROMORPHONE HYDROCHLORIDE 2 MG/ML
1 INJECTION INTRAMUSCULAR; INTRAVENOUS; SUBCUTANEOUS
Refills: 0 | Status: DISCONTINUED | OUTPATIENT
Start: 2020-09-08 | End: 2020-09-09

## 2020-09-08 RX ORDER — HYDROMORPHONE HYDROCHLORIDE 2 MG/ML
2 INJECTION INTRAMUSCULAR; INTRAVENOUS; SUBCUTANEOUS EVERY 6 HOURS
Refills: 0 | Status: DISCONTINUED | OUTPATIENT
Start: 2020-09-08 | End: 2020-09-09

## 2020-09-08 RX ORDER — SACUBITRIL AND VALSARTAN 24; 26 MG/1; MG/1
1 TABLET, FILM COATED ORAL
Refills: 0 | Status: DISCONTINUED | OUTPATIENT
Start: 2020-09-08 | End: 2020-09-09

## 2020-09-08 RX ORDER — SODIUM CHLORIDE 9 MG/ML
1000 INJECTION, SOLUTION INTRAVENOUS
Refills: 0 | Status: DISCONTINUED | OUTPATIENT
Start: 2020-09-08 | End: 2020-09-08

## 2020-09-08 RX ORDER — SODIUM CHLORIDE 9 MG/ML
3 INJECTION INTRAMUSCULAR; INTRAVENOUS; SUBCUTANEOUS EVERY 8 HOURS
Refills: 0 | Status: DISCONTINUED | OUTPATIENT
Start: 2020-09-08 | End: 2020-09-08

## 2020-09-08 RX ORDER — PANTOPRAZOLE SODIUM 20 MG/1
40 TABLET, DELAYED RELEASE ORAL
Refills: 0 | Status: DISCONTINUED | OUTPATIENT
Start: 2020-09-08 | End: 2020-09-09

## 2020-09-08 RX ORDER — CEFOTETAN DISODIUM 1 G
2 VIAL (EA) INJECTION ONCE
Refills: 0 | Status: COMPLETED | OUTPATIENT
Start: 2020-09-08 | End: 2020-09-08

## 2020-09-08 RX ORDER — DEXTROSE MONOHYDRATE, SODIUM CHLORIDE, AND POTASSIUM CHLORIDE 50; .745; 4.5 G/1000ML; G/1000ML; G/1000ML
1000 INJECTION, SOLUTION INTRAVENOUS
Refills: 0 | Status: DISCONTINUED | OUTPATIENT
Start: 2020-09-08 | End: 2020-09-08

## 2020-09-08 RX ORDER — ONDANSETRON 8 MG/1
4 TABLET, FILM COATED ORAL ONCE
Refills: 0 | Status: COMPLETED | OUTPATIENT
Start: 2020-09-08 | End: 2020-09-08

## 2020-09-08 RX ORDER — TACROLIMUS 5 MG/1
3 CAPSULE ORAL EVERY 12 HOURS
Refills: 0 | Status: DISCONTINUED | OUTPATIENT
Start: 2020-09-08 | End: 2020-09-09

## 2020-09-08 RX ORDER — INFLUENZA VIRUS VACCINE 15; 15; 15; 15 UG/.5ML; UG/.5ML; UG/.5ML; UG/.5ML
0.5 SUSPENSION INTRAMUSCULAR ONCE
Refills: 0 | Status: DISCONTINUED | OUTPATIENT
Start: 2020-09-08 | End: 2020-09-09

## 2020-09-08 RX ORDER — KETOROLAC TROMETHAMINE 30 MG/ML
30 SYRINGE (ML) INJECTION EVERY 6 HOURS
Refills: 0 | Status: DISCONTINUED | OUTPATIENT
Start: 2020-09-08 | End: 2020-09-09

## 2020-09-08 RX ORDER — ACETAMINOPHEN 500 MG
975 TABLET ORAL EVERY 6 HOURS
Refills: 0 | Status: DISCONTINUED | OUTPATIENT
Start: 2020-09-08 | End: 2020-09-09

## 2020-09-08 RX ORDER — HYDROMORPHONE HYDROCHLORIDE 2 MG/ML
1 INJECTION INTRAMUSCULAR; INTRAVENOUS; SUBCUTANEOUS
Qty: 20 | Refills: 0
Start: 2020-09-08 | End: 2020-09-12

## 2020-09-08 RX ORDER — HYDROMORPHONE HYDROCHLORIDE 2 MG/ML
4 INJECTION INTRAMUSCULAR; INTRAVENOUS; SUBCUTANEOUS EVERY 6 HOURS
Refills: 0 | Status: DISCONTINUED | OUTPATIENT
Start: 2020-09-08 | End: 2020-09-09

## 2020-09-08 RX ORDER — DEXTROSE MONOHYDRATE, SODIUM CHLORIDE, AND POTASSIUM CHLORIDE 50; .745; 4.5 G/1000ML; G/1000ML; G/1000ML
1000 INJECTION, SOLUTION INTRAVENOUS
Refills: 0 | Status: DISCONTINUED | OUTPATIENT
Start: 2020-09-08 | End: 2020-09-09

## 2020-09-08 RX ORDER — HYDROMORPHONE HYDROCHLORIDE 2 MG/ML
0.5 INJECTION INTRAMUSCULAR; INTRAVENOUS; SUBCUTANEOUS
Refills: 0 | Status: DISCONTINUED | OUTPATIENT
Start: 2020-09-08 | End: 2020-09-09

## 2020-09-08 RX ORDER — ONDANSETRON 8 MG/1
4 TABLET, FILM COATED ORAL EVERY 6 HOURS
Refills: 0 | Status: DISCONTINUED | OUTPATIENT
Start: 2020-09-08 | End: 2020-09-09

## 2020-09-08 RX ORDER — METOPROLOL TARTRATE 50 MG
25 TABLET ORAL
Refills: 0 | Status: DISCONTINUED | OUTPATIENT
Start: 2020-09-08 | End: 2020-09-09

## 2020-09-08 RX ORDER — ACETAMINOPHEN 500 MG
975 TABLET ORAL EVERY 6 HOURS
Refills: 0 | Status: DISCONTINUED | OUTPATIENT
Start: 2020-09-08 | End: 2020-09-08

## 2020-09-08 RX ORDER — CEFOTETAN DISODIUM 1 G
1 VIAL (EA) INJECTION ONCE
Refills: 0 | Status: COMPLETED | OUTPATIENT
Start: 2020-09-08 | End: 2020-09-08

## 2020-09-08 RX ORDER — FENTANYL CITRATE 50 UG/ML
25 INJECTION INTRAVENOUS
Refills: 0 | Status: DISCONTINUED | OUTPATIENT
Start: 2020-09-08 | End: 2020-09-08

## 2020-09-08 RX ORDER — FUROSEMIDE 40 MG
20 TABLET ORAL DAILY
Refills: 0 | Status: DISCONTINUED | OUTPATIENT
Start: 2020-09-08 | End: 2020-09-09

## 2020-09-08 RX ADMIN — HYDROMORPHONE HYDROCHLORIDE 1 MILLIGRAM(S): 2 INJECTION INTRAMUSCULAR; INTRAVENOUS; SUBCUTANEOUS at 15:37

## 2020-09-08 RX ADMIN — ONDANSETRON 4 MILLIGRAM(S): 8 TABLET, FILM COATED ORAL at 23:21

## 2020-09-08 RX ADMIN — SACUBITRIL AND VALSARTAN 1 TABLET(S): 24; 26 TABLET, FILM COATED ORAL at 18:45

## 2020-09-08 RX ADMIN — HYDROMORPHONE HYDROCHLORIDE 0.5 MILLIGRAM(S): 2 INJECTION INTRAMUSCULAR; INTRAVENOUS; SUBCUTANEOUS at 15:47

## 2020-09-08 RX ADMIN — Medication 100 GRAM(S): at 19:12

## 2020-09-08 RX ADMIN — FENTANYL CITRATE 25 MICROGRAM(S): 50 INJECTION INTRAVENOUS at 14:50

## 2020-09-08 RX ADMIN — Medication 100 GRAM(S): at 13:00

## 2020-09-08 RX ADMIN — FENTANYL CITRATE 25 MICROGRAM(S): 50 INJECTION INTRAVENOUS at 14:39

## 2020-09-08 RX ADMIN — FENTANYL CITRATE 25 MICROGRAM(S): 50 INJECTION INTRAVENOUS at 14:56

## 2020-09-08 RX ADMIN — Medication 25 MILLIGRAM(S): at 18:44

## 2020-09-08 RX ADMIN — ONDANSETRON 4 MILLIGRAM(S): 8 TABLET, FILM COATED ORAL at 16:21

## 2020-09-08 RX ADMIN — FENTANYL CITRATE 25 MICROGRAM(S): 50 INJECTION INTRAVENOUS at 14:45

## 2020-09-08 RX ADMIN — HYDROMORPHONE HYDROCHLORIDE 0.5 MILLIGRAM(S): 2 INJECTION INTRAMUSCULAR; INTRAVENOUS; SUBCUTANEOUS at 15:48

## 2020-09-08 RX ADMIN — Medication 30 MILLIGRAM(S): at 15:05

## 2020-09-08 RX ADMIN — Medication 30 MILLIGRAM(S): at 15:36

## 2020-09-08 RX ADMIN — ENOXAPARIN SODIUM 40 MILLIGRAM(S): 100 INJECTION SUBCUTANEOUS at 22:09

## 2020-09-08 RX ADMIN — Medication 15 MILLIGRAM(S): at 18:45

## 2020-09-08 RX ADMIN — FENTANYL CITRATE 25 MICROGRAM(S): 50 INJECTION INTRAVENOUS at 14:55

## 2020-09-08 RX ADMIN — FENTANYL CITRATE 25 MICROGRAM(S): 50 INJECTION INTRAVENOUS at 14:35

## 2020-09-08 RX ADMIN — TACROLIMUS 3 MILLIGRAM(S): 5 CAPSULE ORAL at 18:46

## 2020-09-08 RX ADMIN — FENTANYL CITRATE 25 MICROGRAM(S): 50 INJECTION INTRAVENOUS at 14:33

## 2020-09-08 RX ADMIN — FENTANYL CITRATE 25 MICROGRAM(S): 50 INJECTION INTRAVENOUS at 14:25

## 2020-09-08 RX ADMIN — HYDROMORPHONE HYDROCHLORIDE 2 MILLIGRAM(S): 2 INJECTION INTRAMUSCULAR; INTRAVENOUS; SUBCUTANEOUS at 23:21

## 2020-09-08 RX ADMIN — HYDROMORPHONE HYDROCHLORIDE 1 MILLIGRAM(S): 2 INJECTION INTRAMUSCULAR; INTRAVENOUS; SUBCUTANEOUS at 15:30

## 2020-09-08 RX ADMIN — Medication 30 MILLIGRAM(S): at 23:21

## 2020-09-08 NOTE — DISCHARGE NOTE PROVIDER - NSDCMRMEDTOKEN_GEN_ALL_CORE_FT
Eliquis 5 mg oral tablet: 1 tab(s) orally 2 times a day  Entresto 24 mg-26 mg oral tablet: 1 tab(s) orally 2 times a day  furosemide 20 mg oral tablet: 1 tab(s) orally once a day  Metoprolol Succinate ER 25 mg oral tablet, extended release: 1 tab(s) orally 2 times a day  Multiple Vitamins oral tablet: 1 tab(s) orally once a day  naproxen 500 mg oral tablet: 1 tab(s) orally 2 times a day  omeprazole 20 mg oral delayed release capsule: 1 cap(s) orally once a day  predniSONE 5 mg oral tablet: 1 tab(s) orally once a day  tacrolimus 1 mg oral capsule: 3 cap(s) orally every 12 hours  Vitamin D3 2000 intl units (50 mcg) oral tablet: 1  orally once a day Dilaudid 2 mg oral tablet: 1 tab(s) orally every 6 hours, As Needed -for severe pain MDD:4 tabs  Entresto 24 mg-26 mg oral tablet: 1 tab(s) orally 2 times a day  furosemide 20 mg oral tablet: 1 tab(s) orally once a day  Metoprolol Succinate ER 25 mg oral tablet, extended release: 1 tab(s) orally 2 times a day  Multiple Vitamins oral tablet: 1 tab(s) orally once a day  naproxen 500 mg oral tablet: 1 tab(s) orally 2 times a day  omeprazole 20 mg oral delayed release capsule: 1 cap(s) orally once a day  predniSONE 5 mg oral tablet: Please take three pills twice a day for two days, then two pills twice a day for two days, then one pill twice a day for two days.   tacrolimus 1 mg oral capsule: 3 cap(s) orally every 12 hours  Vitamin D3 2000 intl units (50 mcg) oral tablet: 1  orally once a day Dilaudid 2 mg oral tablet: 1 tab(s) orally every 6 hours, As Needed -for severe pain MDD:4 tabs  enoxaparin 100 mg/mL injectable solution: 100 milligram(s) subcutaneously every 12 hours   Entresto 24 mg-26 mg oral tablet: 1 tab(s) orally 2 times a day  furosemide 20 mg oral tablet: 1 tab(s) orally once a day  Lovenox 40 mg/0.4 mL injectable solution: 40 milligram(s) subcutaneously once a day   Metoprolol Succinate ER 25 mg oral tablet, extended release: 1 tab(s) orally 2 times a day  Multiple Vitamins oral tablet: 1 tab(s) orally once a day  naproxen 500 mg oral tablet: 1 tab(s) orally 2 times a day  omeprazole 20 mg oral delayed release capsule: 1 cap(s) orally once a day  predniSONE 5 mg oral tablet: Please take three pills twice a day for two days, then two pills twice a day for two days, then one pill twice a day for two days.   tacrolimus 1 mg oral capsule: 3 cap(s) orally every 12 hours  Vitamin D3 2000 intl units (50 mcg) oral tablet: 1  orally once a day Dilaudid 2 mg oral tablet: 1 tab(s) orally every 6 hours, As Needed -for severe pain MDD:4 tabs  enoxaparin 100 mg/mL injectable solution: 100 milligram(s) subcutaneously every 12 hours   Entresto 24 mg-26 mg oral tablet: 1 tab(s) orally 2 times a day  furosemide 20 mg oral tablet: 1 tab(s) orally once a day  Lovenox 40 mg/0.4 mL injectable solution: 40 milligram(s) subcutaneously once a day   Metoprolol Succinate ER 25 mg oral tablet, extended release: 1 tab(s) orally 2 times a day  Multiple Vitamins oral tablet: 1 tab(s) orally once a day  omeprazole 20 mg oral delayed release capsule: 1 cap(s) orally once a day  predniSONE 5 mg oral tablet: Please take three pills twice a day for two days, then two pills twice a day for two days, then one pill twice a day for two days.   Senna 8.6 mg oral tablet: 2 tab(s) orally once a day (at bedtime)   tacrolimus 1 mg oral capsule: 3 cap(s) orally every 12 hours  Vitamin D3 2000 intl units (50 mcg) oral tablet: 1  orally once a day  Zofran 4 mg oral tablet: 1 tab(s) orally every 6 hours, As Needed -for nausea

## 2020-09-08 NOTE — DISCHARGE NOTE PROVIDER - NSDCFUADDINST_GEN_ALL_CORE_FT
May walk and climb stairs as often as you would like, no vigorous activity, do not lift anything greater than 10lbs, nothing per vagina x 6 weeks, do not drive while on pain medication.     Please contact your provider for any pain uncontrolled by medication, excessive bleeding or Fever>100.4  Please take Naprosyn 1 tablet every 12 hours x 3 days. May walk and climb stairs as often as you would like, no vigorous activity, do not lift anything greater than 10lbs, nothing per vagina x 6 weeks, do not drive while on pain medication.     May resume regular dose for prednisone once script sent by us is completed.     Please contact your provider for any pain uncontrolled by medication, excessive bleeding or Fever>100.4  Please take Naprosyn 1 tablet every 12 hours x 3 days. May take dilaudid as needed for severe pain. May resume regular dose of prednisone once script sent by us (Prednisone taper) is completed.     Please take script for lovenox 40mg on Thursday 9/10, please take script for lovenox 100mg on Friday 9/11. May resume eliquis on 9/12/20    Please contact your provider for any pain uncontrolled by medication, excessive bleeding or Fever>100.4  Please take Naprosyn 1 tablet every 12 hours x 3 days. May take dilaudid as needed for severe pain.

## 2020-09-08 NOTE — ASU DISCHARGE PLAN (ADULT/PEDIATRIC) - ASU DC SPECIAL INSTRUCTIONSFT
Please follow-up with PA in one week for post-op visit via TELEHEALTH.  Follow-up with Dr. Paredes in two weeks to review pathology report.

## 2020-09-08 NOTE — CONSULT NOTE ADULT - SUBJECTIVE AND OBJECTIVE BOX
HPI:  65 year old female with h/o A fib on Eliquis, autoimmune hepatitis with cirrhosis ( dx at age 29) s/p liver transplant in 2004 present to PST with c/o newly diagnosed endometrial adenocarcinoma s/p EMB on 7/29/20 that revealed FIGO grade 2 endometrioid adenocarcinoma, MSI intact.  Patient state she has a prolapse  uterus and occasionally she would notice spotting once a month.  She went to the GYN had a  pap smear which was noted with abnormal cells as well. She state she also had EMB, pet/ct and lower extremity dopplers.  She is now schedule for total vaginal hysterectomy bilateral salpingo oophorectomy, cystoscopy.     Pet/ct 8/19/2020- endometrial FDG avidity consistent with known malignancy. FDG avidity of the terminal ileum which is nonspecific and possibly physiologic. Additional difficult to delineate focal FDG avidity hepatic flexure. FDG avid 0.7 cm right intraparotid soft tissue nodule is indeterminate and may represent benign or malignant entities. Enlarged left thyroid lobe with small calcifications.  US DPLX LWR EXT doppler no evidence of DVT. (31 Aug 2020 14:33)    Home Medications:   · 	omeprazole 20 mg oral delayed release capsule: Last Dose Taken:  , 1 cap(s) orally once a day  · 	predniSONE 5 mg oral tablet: Last Dose Taken:  , 1 tab(s) orally once a BID  · 	tacrolimus 1 mg oral capsule: Last Dose Taken:  , 3 cap(s) orally every 12 hours  · 	Eliquis 5 mg oral tablet: Last Dose Taken:  , 1 tab(s) orally 2 times a day  · 	Metoprolol Succinate ER 25 mg oral tablet, extended release: Last Dose Taken:  , 1 tab(s) orally 2 times a day  · 	Multiple Vitamins oral tablet: Last Dose Taken:  , 1 tab(s) orally once a day          · 	Vitamin D3 2000 intl units (50 mcg) oral tablet: Last Dose Taken:  , 1  orally once a day    PAST MEDICAL & SURGICAL HISTORY:  Anemia  History of pseudogout  Lymphedema of leg  Scoliosis  Squamous cell skin cancer: neck face arms  DVT (deep venous thrombosis): LLE  Autoimmune hepatitis: diagnosed age 29  Liver disease  Edema  GERD (gastroesophageal reflux disease)  HLD (hyperlipidemia)  HTN (hypertension)  Afib  History of thyroglossal duct cyst removal  H/O wrist surgery  S/P shoulder surgery: debribdredment FEb 2017  Iza filter in place  H/O laparoscopic adjustable gastric banding: 10 years ago  Pseudogout: left shoulder and elbow  H/O prior ablation treatment: 1/2018  S/P liver transplant: 9/2007    acetaminophen   Tablet .. 975 milliGRAM(s) Oral every 6 hours PRN  cefoTEtan  IVPB 1 Gram(s) IV Intermittent once  dextrose 5% + sodium chloride 0.45% with potassium chloride 20 mEq/L 1000 milliLiter(s) IV Continuous <Continuous>  enoxaparin Injectable 40 milliGRAM(s) SubCutaneous daily  fentaNYL    Injectable 25 MICROGram(s) IV Push every 10 minutes PRN  furosemide    Tablet 20 milliGRAM(s) Oral daily  HYDROmorphone   Tablet 2 milliGRAM(s) Oral every 6 hours PRN  HYDROmorphone   Tablet 4 milliGRAM(s) Oral every 6 hours PRN  HYDROmorphone  Injectable 1 milliGRAM(s) IV Push every 30 minutes PRN  HYDROmorphone  Injectable 0.5 milliGRAM(s) IV Push every 15 minutes PRN  ketorolac   Injectable 30 milliGRAM(s) IV Push every 6 hours  metoprolol succinate ER 25 milliGRAM(s) Oral two times a day  ondansetron Injectable 4 milliGRAM(s) IV Push every 6 hours PRN  pantoprazole    Tablet 40 milliGRAM(s) Oral before breakfast  predniSONE   Tablet 15 milliGRAM(s) Oral two times a day  sacubitril 24 mG/valsartan 26 mG 1 Tablet(s) Oral two times a day  tacrolimus 3 milliGRAM(s) Oral every 12 hours    MEDICATIONS  (STANDING):  cefoTEtan  IVPB 1 Gram(s) IV Intermittent once  dextrose 5% + sodium chloride 0.45% with potassium chloride 20 mEq/L 1000 milliLiter(s) (125 mL/Hr) IV Continuous <Continuous>  enoxaparin Injectable 40 milliGRAM(s) SubCutaneous daily  furosemide    Tablet 20 milliGRAM(s) Oral daily  ketorolac   Injectable 30 milliGRAM(s) IV Push every 6 hours  metoprolol succinate ER 25 milliGRAM(s) Oral two times a day  pantoprazole    Tablet 40 milliGRAM(s) Oral before breakfast  predniSONE   Tablet 15 milliGRAM(s) Oral two times a day  sacubitril 24 mG/valsartan 26 mG 1 Tablet(s) Oral two times a day  tacrolimus 3 milliGRAM(s) Oral every 12 hours    MEDICATIONS  (PRN):  acetaminophen   Tablet .. 975 milliGRAM(s) Oral every 6 hours PRN Mild Pain (1 - 3)  fentaNYL    Injectable 25 MICROGram(s) IV Push every 10 minutes PRN Moderate Pain (4 - 6)  HYDROmorphone   Tablet 2 milliGRAM(s) Oral every 6 hours PRN Moderate Pain (4 - 6)  HYDROmorphone   Tablet 4 milliGRAM(s) Oral every 6 hours PRN Severe Pain (7 - 10)  HYDROmorphone  Injectable 1 milliGRAM(s) IV Push every 30 minutes PRN Severe Pain (7 - 10)  HYDROmorphone  Injectable 0.5 milliGRAM(s) IV Push every 15 minutes PRN Severe Pain (7 - 10)  ondansetron Injectable 4 milliGRAM(s) IV Push every 6 hours PRN Nausea and/or Vomiting      Allergies    No Known Allergies    Intolerances    Cheese (Vomiting)  codeine (Vomiting; Nausea)      SOCIAL HISTORY:  No S/D/ IVDU      FAMILY HISTORY:  FH: leukemia  FH: breast cancer: grandmother  FH: diabetes mellitus      ROS  - Headache  - Neck Stiffness  - Chest Pain  - SOB  - Abd pain  - Pelvic Pain  - Leg Pain  Mild Pelvic pain      Vital Signs Last 24 Hrs  T(C): 36.1 (08 Sep 2020 16:45), Max: 36.6 (08 Sep 2020 14:15)  T(F): 97 (08 Sep 2020 16:45), Max: 97.8 (08 Sep 2020 14:15)  HR: 92 (08 Sep 2020 17:00) (71 - 105)  BP: 133/91 (08 Sep 2020 17:00) (111/68 - 137/88)  BP(mean): --  RR: 25 (08 Sep 2020 17:00) (13 - 27)  SpO2: 96% (08 Sep 2020 17:00) (95% - 99%)  HEENT: PEARLA  Neck: Supple  Cardio: S1 S2 No Murmur Irregular  Pulm: CTA No Rales or Ronchi  Abd: Soft NT ND BS+  Rectal - refused  Ext: No DCT Dharmesh Edema  Skin: No Rash  Neuro: Awake Pleasant    Endometrial Ca - hysterotomy, pain  control   Anemia - follow hb  Squamous cell skin cancer: neck face arms - out pt follow up   DVT (deep venous thrombosis): LLE  HTN (hypertension) - Diet metoprolol   Afib - rate control metroprolol, eloquis once surgically acceptable   DVT  - Iza filter in place  Autoimmune hepatitis:  / Liver transplant: 9/2007 -  restart immunosuppressant once surgically acceptable, low stress dose of streiods total 30 qd

## 2020-09-08 NOTE — ASU DISCHARGE PLAN (ADULT/PEDIATRIC) - CALL YOUR DOCTOR IF YOU HAVE ANY OF THE FOLLOWING:
Bleeding that does not stop/Inability to tolerate liquids or foods/Nausea and vomiting that does not stop/Fever greater than (need to indicate Fahrenheit or Celsius)

## 2020-09-08 NOTE — BRIEF OPERATIVE NOTE - NSICDXBRIEFPOSTOP_GEN_ALL_CORE_FT
POST-OP DIAGNOSIS:  Uterovaginal prolapse 08-Sep-2020 14:15:07  Evy Fernandez  Endometrial cancer 08-Sep-2020 14:15:00  Evy Fernandez

## 2020-09-08 NOTE — DISCHARGE NOTE PROVIDER - HOSPITAL COURSE
Patient post-operatively had an uncomplicated hospital course. Her pain was well controlled. She is tolerating a regular diet. She is ambulating independently. She was able to void after removal of licona. Patient with flatus. Labs and Vitals WNL upon discharge.

## 2020-09-08 NOTE — BRIEF OPERATIVE NOTE - NSICDXBRIEFPROCEDURE_GEN_ALL_CORE_FT
PROCEDURES:  Cystoscopy 08-Sep-2020 14:14:03  Evy Fernandez  Total vaginal hysterectomy with salpingo-oophorectomy for uterus 250 grams or less 08-Sep-2020 14:13:56  Evy Fernandez

## 2020-09-08 NOTE — DISCHARGE NOTE PROVIDER - NSDCFUSCHEDAPPT_GEN_ALL_CORE_FT
JAYE MCLEAN ; 09/21/2020 ; [facility] Card[facility] JAYE MCLEAN ; 09/21/2020 ; [facility] Cardio [facility] JAYE MCLEAN ; 09/21/2020 ; [facility] Cardio [facility] JAYE MCLEAN ; 09/21/2020 ; NPP Cardio [facility]

## 2020-09-08 NOTE — PROGRESS NOTE ADULT - PROBLEM SELECTOR PLAN 1
Patient recovering well  Continue dilaudid and toradol for pain control  SCD's and ppx Lovenox for DVT ppx  Ambulate as tolerated  Encourage IS use  IVF at 75cc/hr  Will remove licona in AM for TOV if output remains adequate overnight following removal of vaginal packing  F/u AM labs  Home meds resumed, Medicine consult placed for management of comorbidities post-op  Otherwise will continue routine post-operative care Patient recovering well  Continue dilaudid and toradol for pain control  SCD's and ppx Lovenox for DVT ppx  Ambulate as tolerated  Encourage IS use  IVF at 100cc/hr  Will remove licona in AM for TOV if output remains adequate overnight following removal of vaginal packing  F/u AM labs  Home meds resumed, Medicine consult placed for management of comorbidities post-op  Otherwise will continue routine post-operative care

## 2020-09-08 NOTE — DISCHARGE NOTE PROVIDER - CARE PROVIDER_API CALL
Rogers Paredes  GYNECOLOGIC ONCOLOGY  [address] [place] [address]  Phone: [phone]  Fax: [phone]  Follow Up Time:

## 2020-09-08 NOTE — DISCHARGE NOTE PROVIDER - NSDCFUADDAPPT_GEN_ALL_CORE_FT
Please follow-up with PA in one week for post-op visit via TELEHEALTH.   Follow-up with Dr. Paredes in two weeks to review pathology report. Please follow-up with PA in one week for post-op visit via TELEHEALTH.   Follow-up with Dr. Paredes in two weeks to review pathology report.    ACTIVITY  May walk and climb stairs as often as you would like, no vigorous activity, do not lift anything greater than 10lbs, nothing per vagina x 6 weeks, do not drive while on pain medication.

## 2020-09-08 NOTE — PROGRESS NOTE ADULT - SUBJECTIVE AND OBJECTIVE BOX
GYNECOLOGIC ONCOLOGY PROGRESS NOTE    POD#0    PROBLEMS:  Need for prophylactic measure  HTN (hypertension)  Afib  History of liver transplant  Endometrial cancer  GERD  HLD  HTN  Hx DVT  HF with reduced EF    Pt seen and examined at bedside.     SUBJECTIVE:    Patient is without complaints.  Pain well-controlled.  Denies Nausea, Vomiting or Diarrhea.  Denies shortness of breath, chest pain or dyspnea on exertion.  Tolerating diet.    OBJECTIVE:     VITALS:  T(F): 97.8 (09-08-20 @ 14:15), Max: 97.8 (09-08-20 @ 14:15)  HR: 92 (09-08-20 @ 16:45) (71 - 105)  BP: 135/88 (09-08-20 @ 16:45) (111/68 - 137/88)  RR: 20 (09-08-20 @ 16:45) (13 - 27)  SpO2: 97% (09-08-20 @ 16:45) (95% - 99%)    I&O's Summary  licona-cc's output overnight    MEDICATIONS  (STANDING):  cefoTEtan  IVPB 1 Gram(s) IV Intermittent once  dextrose 5% + sodium chloride 0.45% with potassium chloride 20 mEq/L 1000 milliLiter(s) (125 mL/Hr) IV Continuous <Continuous>  enoxaparin Injectable 40 milliGRAM(s) SubCutaneous daily  furosemide    Tablet 20 milliGRAM(s) Oral daily  ketorolac   Injectable 30 milliGRAM(s) IV Push every 6 hours  metoprolol succinate ER 25 milliGRAM(s) Oral two times a day  pantoprazole    Tablet 40 milliGRAM(s) Oral before breakfast  predniSONE   Tablet 5 milliGRAM(s) Oral daily  sacubitril 24 mG/valsartan 26 mG 1 Tablet(s) Oral two times a day  tacrolimus 3 milliGRAM(s) Oral every 12 hours    MEDICATIONS  (PRN):  acetaminophen   Tablet .. 975 milliGRAM(s) Oral every 6 hours PRN Mild Pain (1 - 3)  fentaNYL    Injectable 25 MICROGram(s) IV Push every 10 minutes PRN Moderate Pain (4 - 6)  HYDROmorphone   Tablet 2 milliGRAM(s) Oral every 6 hours PRN Moderate Pain (4 - 6)  HYDROmorphone   Tablet 4 milliGRAM(s) Oral every 6 hours PRN Severe Pain (7 - 10)  HYDROmorphone  Injectable 1 milliGRAM(s) IV Push every 30 minutes PRN Severe Pain (7 - 10)  HYDROmorphone  Injectable 0.5 milliGRAM(s) IV Push every 15 minutes PRN Severe Pain (7 - 10)  ondansetron Injectable 4 milliGRAM(s) IV Push every 6 hours PRN Nausea and/or Vomiting      Physical Exam:  Constitutional: NAD  Pulmonary: clear to auscultation bilaterally   Cardiovascular: Regular rate and rhythm   Abdomen: soft, non-tender, non-distended, normal bowel sounds  Extremities: no lower extremity edema or calve tenderness, Rod's sign negative.  : Pad Clean and dry. Without signs of infection.      LABS: AM Labs ordered GYNECOLOGIC ONCOLOGY PROGRESS NOTE    POD#0    PROBLEMS:  Need for prophylactic measure  HTN (hypertension)  Afib  History of liver transplant  Endometrial cancer  GERD  HLD  HTN  Hx DVT  HF with reduced EF    Pt seen and examined at bedside.     SUBJECTIVE:    Patient is without complaints.  Pain well-controlled.  Denies Nausea, Vomiting or Diarrhea.  Denies shortness of breath, chest pain or dyspnea on exertion.  Tolerating diet.    OBJECTIVE:     VITALS:  T(F): 97.8 (09-08-20 @ 14:15), Max: 97.8 (09-08-20 @ 14:15)  HR: 92 (09-08-20 @ 16:45) (71 - 105)  BP: 135/88 (09-08-20 @ 16:45) (111/68 - 137/88)  RR: 20 (09-08-20 @ 16:45) (13 - 27)  SpO2: 97% (09-08-20 @ 16:45) (95% - 99%)    I&O's Summary  licona-cc's output overnight    MEDICATIONS  (STANDING):  cefoTEtan  IVPB 1 Gram(s) IV Intermittent once  dextrose 5% + sodium chloride 0.45% with potassium chloride 20 mEq/L 1000 milliLiter(s) (125 mL/Hr) IV Continuous <Continuous>  enoxaparin Injectable 40 milliGRAM(s) SubCutaneous daily  furosemide    Tablet 20 milliGRAM(s) Oral daily  ketorolac   Injectable 30 milliGRAM(s) IV Push every 6 hours  metoprolol succinate ER 25 milliGRAM(s) Oral two times a day  pantoprazole    Tablet 40 milliGRAM(s) Oral before breakfast  predniSONE   Tablet 5 milliGRAM(s) Oral daily  sacubitril 24 mG/valsartan 26 mG 1 Tablet(s) Oral two times a day  tacrolimus 3 milliGRAM(s) Oral every 12 hours    MEDICATIONS  (PRN):  acetaminophen   Tablet .. 975 milliGRAM(s) Oral every 6 hours PRN Mild Pain (1 - 3)  fentaNYL    Injectable 25 MICROGram(s) IV Push every 10 minutes PRN Moderate Pain (4 - 6)  HYDROmorphone   Tablet 2 milliGRAM(s) Oral every 6 hours PRN Moderate Pain (4 - 6)  HYDROmorphone   Tablet 4 milliGRAM(s) Oral every 6 hours PRN Severe Pain (7 - 10)  HYDROmorphone  Injectable 1 milliGRAM(s) IV Push every 30 minutes PRN Severe Pain (7 - 10)  HYDROmorphone  Injectable 0.5 milliGRAM(s) IV Push every 15 minutes PRN Severe Pain (7 - 10)  ondansetron Injectable 4 milliGRAM(s) IV Push every 6 hours PRN Nausea and/or Vomiting      Physical Exam:  Constitutional: NAD  Pulmonary: clear to auscultation bilaterally   Cardiovascular: Regular rate and rhythm   Abdomen: soft, non-tender, non-distended, normal bowel sounds  Extremities: chronic lower extremity edema noted, no calve tenderness, Rod's sign negative.  : Pad Clean and dry. Without signs of infection.      LABS: AM Labs ordered GYNECOLOGIC ONCOLOGY PROGRESS NOTE    POD#0    PROBLEMS:  Need for prophylactic measure  HTN (hypertension)  Afib  History of liver transplant  Endometrial cancer  GERD  HLD  HTN  Hx DVT  HF with reduced EF    Pt seen and examined at bedside.     SUBJECTIVE:    Patient is without complaints.  Pain well-controlled.  Denies Nausea, Vomiting or Diarrhea.  Denies shortness of breath, chest pain or dyspnea on exertion.  Tolerating diet.    OBJECTIVE:     VITALS:  T(F): 97.8 (09-08-20 @ 14:15), Max: 97.8 (09-08-20 @ 14:15)  HR: 92 (09-08-20 @ 16:45) (71 - 105)  BP: 135/88 (09-08-20 @ 16:45) (111/68 - 137/88)  RR: 20 (09-08-20 @ 16:45) (13 - 27)  SpO2: 97% (09-08-20 @ 16:45) (95% - 99%)    I&O's Summary  licona-100cc's output over past 3 hours    MEDICATIONS  (STANDING):  cefoTEtan  IVPB 1 Gram(s) IV Intermittent once  dextrose 5% + sodium chloride 0.45% with potassium chloride 20 mEq/L 1000 milliLiter(s) (125 mL/Hr) IV Continuous <Continuous>  enoxaparin Injectable 40 milliGRAM(s) SubCutaneous daily  furosemide    Tablet 20 milliGRAM(s) Oral daily  ketorolac   Injectable 30 milliGRAM(s) IV Push every 6 hours  metoprolol succinate ER 25 milliGRAM(s) Oral two times a day  pantoprazole    Tablet 40 milliGRAM(s) Oral before breakfast  predniSONE   Tablet 5 milliGRAM(s) Oral daily  sacubitril 24 mG/valsartan 26 mG 1 Tablet(s) Oral two times a day  tacrolimus 3 milliGRAM(s) Oral every 12 hours    MEDICATIONS  (PRN):  acetaminophen   Tablet .. 975 milliGRAM(s) Oral every 6 hours PRN Mild Pain (1 - 3)  fentaNYL    Injectable 25 MICROGram(s) IV Push every 10 minutes PRN Moderate Pain (4 - 6)  HYDROmorphone   Tablet 2 milliGRAM(s) Oral every 6 hours PRN Moderate Pain (4 - 6)  HYDROmorphone   Tablet 4 milliGRAM(s) Oral every 6 hours PRN Severe Pain (7 - 10)  HYDROmorphone  Injectable 1 milliGRAM(s) IV Push every 30 minutes PRN Severe Pain (7 - 10)  HYDROmorphone  Injectable 0.5 milliGRAM(s) IV Push every 15 minutes PRN Severe Pain (7 - 10)  ondansetron Injectable 4 milliGRAM(s) IV Push every 6 hours PRN Nausea and/or Vomiting      Physical Exam:  Constitutional: NAD  Pulmonary: clear to auscultation bilaterally   Cardiovascular: Regular rate and rhythm   Abdomen: soft, non-tender, non-distended, normal bowel sounds  Extremities: chronic lower extremity edema noted, no calve tenderness, Rod's sign negative.  : Pad Clean and dry. Without signs of infection.      LABS: AM Labs ordered

## 2020-09-08 NOTE — BRIEF OPERATIVE NOTE - OPERATION/FINDINGS
Uterovaginal prolapse. Uterus, b/l fallopian tubes and ovaries wnl. Jets seen from b/l UOs on cystoscopy.

## 2020-09-08 NOTE — ASU DISCHARGE PLAN (ADULT/PEDIATRIC) - CARE PROVIDER_API CALL
Rogers Paredes  GYNECOLOGIC ONCOLOGY  [address] [place] [address]  Phone: [phone]  Fax: [phone]  Follow Up Time:

## 2020-09-08 NOTE — BRIEF OPERATIVE NOTE - NSICDXBRIEFPREOP_GEN_ALL_CORE_FT
PRE-OP DIAGNOSIS:  Uterovaginal prolapse 08-Sep-2020 14:14:51  Evy Fernandez  Endometrial cancer 08-Sep-2020 14:14:34  Evy Fernandez

## 2020-09-09 ENCOUNTER — TRANSCRIPTION ENCOUNTER (OUTPATIENT)
Age: 65
End: 2020-09-09

## 2020-09-09 VITALS
OXYGEN SATURATION: 95 % | SYSTOLIC BLOOD PRESSURE: 108 MMHG | DIASTOLIC BLOOD PRESSURE: 80 MMHG | TEMPERATURE: 97 F | HEART RATE: 70 BPM | RESPIRATION RATE: 18 BRPM

## 2020-09-09 LAB
ANION GAP SERPL CALC-SCNC: 8 MMOL/L — SIGNIFICANT CHANGE UP (ref 5–17)
BASOPHILS # BLD AUTO: 0.01 K/UL — SIGNIFICANT CHANGE UP (ref 0–0.2)
BASOPHILS NFR BLD AUTO: 0.1 % — SIGNIFICANT CHANGE UP (ref 0–2)
BUN SERPL-MCNC: 19 MG/DL — SIGNIFICANT CHANGE UP (ref 8–20)
CALCIUM SERPL-MCNC: 8.6 MG/DL — SIGNIFICANT CHANGE UP (ref 8.6–10.2)
CHLORIDE SERPL-SCNC: 101 MMOL/L — SIGNIFICANT CHANGE UP (ref 98–107)
CO2 SERPL-SCNC: 24 MMOL/L — SIGNIFICANT CHANGE UP (ref 22–29)
CREAT SERPL-MCNC: 0.8 MG/DL — SIGNIFICANT CHANGE UP (ref 0.5–1.3)
EOSINOPHIL # BLD AUTO: 0 K/UL — SIGNIFICANT CHANGE UP (ref 0–0.5)
EOSINOPHIL NFR BLD AUTO: 0 % — SIGNIFICANT CHANGE UP (ref 0–6)
GLUCOSE SERPL-MCNC: 205 MG/DL — HIGH (ref 70–99)
HCT VFR BLD CALC: 39 % — SIGNIFICANT CHANGE UP (ref 34.5–45)
HGB BLD-MCNC: 11.8 G/DL — SIGNIFICANT CHANGE UP (ref 11.5–15.5)
IMM GRANULOCYTES NFR BLD AUTO: 0.5 % — SIGNIFICANT CHANGE UP (ref 0–1.5)
LYMPHOCYTES # BLD AUTO: 1.32 K/UL — SIGNIFICANT CHANGE UP (ref 1–3.3)
LYMPHOCYTES # BLD AUTO: 10.2 % — LOW (ref 13–44)
MCHC RBC-ENTMCNC: 27.7 PG — SIGNIFICANT CHANGE UP (ref 27–34)
MCHC RBC-ENTMCNC: 30.3 GM/DL — LOW (ref 32–36)
MCV RBC AUTO: 91.5 FL — SIGNIFICANT CHANGE UP (ref 80–100)
MONOCYTES # BLD AUTO: 0.81 K/UL — SIGNIFICANT CHANGE UP (ref 0–0.9)
MONOCYTES NFR BLD AUTO: 6.3 % — SIGNIFICANT CHANGE UP (ref 2–14)
NEUTROPHILS # BLD AUTO: 10.73 K/UL — HIGH (ref 1.8–7.4)
NEUTROPHILS NFR BLD AUTO: 82.9 % — HIGH (ref 43–77)
PLATELET # BLD AUTO: 243 K/UL — SIGNIFICANT CHANGE UP (ref 150–400)
POTASSIUM SERPL-MCNC: 5.4 MMOL/L — HIGH (ref 3.5–5.3)
POTASSIUM SERPL-SCNC: 5.4 MMOL/L — HIGH (ref 3.5–5.3)
RBC # BLD: 4.26 M/UL — SIGNIFICANT CHANGE UP (ref 3.8–5.2)
RBC # FLD: 16.1 % — HIGH (ref 10.3–14.5)
SODIUM SERPL-SCNC: 133 MMOL/L — LOW (ref 135–145)
WBC # BLD: 12.94 K/UL — HIGH (ref 3.8–10.5)
WBC # FLD AUTO: 12.94 K/UL — HIGH (ref 3.8–10.5)

## 2020-09-09 PROCEDURE — 86850 RBC ANTIBODY SCREEN: CPT

## 2020-09-09 PROCEDURE — 88309 TISSUE EXAM BY PATHOLOGIST: CPT

## 2020-09-09 PROCEDURE — 36415 COLL VENOUS BLD VENIPUNCTURE: CPT

## 2020-09-09 PROCEDURE — 80048 BASIC METABOLIC PNL TOTAL CA: CPT

## 2020-09-09 PROCEDURE — 86900 BLOOD TYPING SEROLOGIC ABO: CPT

## 2020-09-09 PROCEDURE — 85025 COMPLETE CBC W/AUTO DIFF WBC: CPT

## 2020-09-09 PROCEDURE — 86901 BLOOD TYPING SEROLOGIC RH(D): CPT

## 2020-09-09 PROCEDURE — 88305 TISSUE EXAM BY PATHOLOGIST: CPT

## 2020-09-09 PROCEDURE — 82962 GLUCOSE BLOOD TEST: CPT

## 2020-09-09 RX ORDER — ONDANSETRON 8 MG/1
1 TABLET, FILM COATED ORAL
Qty: 12 | Refills: 0
Start: 2020-09-09 | End: 2020-09-11

## 2020-09-09 RX ORDER — ENOXAPARIN SODIUM 100 MG/ML
100 INJECTION SUBCUTANEOUS
Qty: 200 | Refills: 0
Start: 2020-09-09 | End: 2020-09-09

## 2020-09-09 RX ORDER — APIXABAN 2.5 MG/1
1 TABLET, FILM COATED ORAL
Qty: 0 | Refills: 0 | DISCHARGE

## 2020-09-09 RX ORDER — BENZOCAINE AND MENTHOL 5; 1 G/100ML; G/100ML
1 LIQUID ORAL THREE TIMES A DAY
Refills: 0 | Status: DISCONTINUED | OUTPATIENT
Start: 2020-09-09 | End: 2020-09-09

## 2020-09-09 RX ORDER — ENOXAPARIN SODIUM 100 MG/ML
40 INJECTION SUBCUTANEOUS
Qty: 40 | Refills: 0
Start: 2020-09-09 | End: 2020-09-09

## 2020-09-09 RX ORDER — SENNA PLUS 8.6 MG/1
2 TABLET ORAL
Qty: 10 | Refills: 0
Start: 2020-09-09 | End: 2020-09-13

## 2020-09-09 RX ADMIN — Medication 20 MILLIGRAM(S): at 07:05

## 2020-09-09 RX ADMIN — Medication 30 MILLIGRAM(S): at 07:14

## 2020-09-09 RX ADMIN — ENOXAPARIN SODIUM 40 MILLIGRAM(S): 100 INJECTION SUBCUTANEOUS at 11:17

## 2020-09-09 RX ADMIN — TACROLIMUS 3 MILLIGRAM(S): 5 CAPSULE ORAL at 07:05

## 2020-09-09 RX ADMIN — Medication 30 MILLIGRAM(S): at 07:36

## 2020-09-09 RX ADMIN — Medication 25 MILLIGRAM(S): at 07:05

## 2020-09-09 RX ADMIN — Medication 30 MILLIGRAM(S): at 11:15

## 2020-09-09 RX ADMIN — Medication 15 MILLIGRAM(S): at 07:05

## 2020-09-09 RX ADMIN — PANTOPRAZOLE SODIUM 40 MILLIGRAM(S): 20 TABLET, DELAYED RELEASE ORAL at 07:15

## 2020-09-09 NOTE — PROGRESS NOTE ADULT - SUBJECTIVE AND OBJECTIVE BOX
GYNECOLOGIC ONCOLOGY PROGRESS NOTE    Patient is a 65 y.o s/p vaginal hysterectomy BSO, cystoscopy 2/2 to endometrioid adenomacarcinoma (FIGO grade 2), now POD#1    PROBLEMS:  HTN (hypertension)  Afib  History of liver transplant  Endometrial cancer      SUBJECTIVE:    Pt seen and examined at bedside this AM.  Patient overall feeling well.   Pain is well controlled with PRN pain medications.  She is tolerating regular diet.  Denies Nausea, Vomiting or Diarrhea.  Reports passage of flatus. Denies bowel movement  Pierce still in place  Denies shortness of breath, chest pain or dyspnea on exertion.  Otherwise, no additional complaints at this time.      OBJECTIVE:     VITALS:  T(F): 97.5 (09-09-20 @ 05:03), Max: 97.8 (09-08-20 @ 14:15)  HR: 77 (09-09-20 @ 05:03) (71 - 105)  BP: 119/81 (09-09-20 @ 05:03) (111/68 - 137/88)  RR: 18 (09-09-20 @ 05:03) (13 - 27)  SpO2: 96% (09-09-20 @ 05:03) (95% - 99%)    I&O's Summary    08 Sep 2020 07:01  -  09 Sep 2020 07:00  --------------------------------------------------------  IN: 0 mL / OUT: 475 mL / NET: -475 mL        MEDICATIONS  (STANDING):  dextrose 5% + sodium chloride 0.45% with potassium chloride 20 mEq/L 1000 milliLiter(s) (100 mL/Hr) IV Continuous <Continuous>  enoxaparin Injectable 40 milliGRAM(s) SubCutaneous daily  furosemide    Tablet 20 milliGRAM(s) Oral daily  influenza   Vaccine 0.5 milliLiter(s) IntraMuscular once  ketorolac   Injectable 30 milliGRAM(s) IV Push every 6 hours  metoprolol succinate ER 25 milliGRAM(s) Oral two times a day  pantoprazole    Tablet 40 milliGRAM(s) Oral before breakfast  predniSONE   Tablet 15 milliGRAM(s) Oral two times a day  sacubitril 24 mG/valsartan 26 mG 1 Tablet(s) Oral two times a day  tacrolimus 3 milliGRAM(s) Oral every 12 hours    MEDICATIONS  (PRN):  acetaminophen   Tablet .. 975 milliGRAM(s) Oral every 6 hours PRN Mild Pain (1 - 3)  HYDROmorphone   Tablet 2 milliGRAM(s) Oral every 6 hours PRN Moderate Pain (4 - 6)  HYDROmorphone   Tablet 4 milliGRAM(s) Oral every 6 hours PRN Severe Pain (7 - 10)  HYDROmorphone  Injectable 1 milliGRAM(s) IV Push every 30 minutes PRN Severe Pain (7 - 10)  HYDROmorphone  Injectable 0.5 milliGRAM(s) IV Push every 15 minutes PRN Severe Pain (7 - 10)  ondansetron Injectable 4 milliGRAM(s) IV Push every 6 hours PRN Nausea and/or Vomiting      Physical Exam:  Constitutional: NAD  Pulmonary: clear to auscultation bilaterally   Cardiovascular: Regular rate and rhythm   Abdomen: +BS. Softly distended, Appropriately tender along RLQ/LLQ. No rebound or guarding appreciated  Extremities: Bilateral chronic lower extremity lymphadenoma. NO visible ulcers, erythema, or lesions present. B/L anterior shin tenderness on palpation.  Rod's sign negative.  VE: Vaginal packing removed. Minimal vaginal bleeding noted.    LABS:                        11.8   12.94 )-----------( 243      ( 09 Sep 2020 06:19 )             39.0     09-09    133<L>  |  101  |  19.0  ----------------------------<  205<H>  5.4<H>   |  24.0  |  0.80    Ca    8.6      09 Sep 2020 06:19            RADIOLOGY & ADDITIONAL TESTS:  < from: NM PET/CT Onc FDG Skull to Thigh, Inital (08.19.20 @ 15:32) >    EXAM:  PETCT DEEPAK ONC FDG INIT        PROCEDURE DATE:  08/19/2020           INTERPRETATION:  PROCEDURE:  PET/CT SKULL BASE-MID THIGH IMAGING    RADIOPHARMACEUTICAL:  12.0 mCi F-18, FDG, I.V.    CLINICAL INFORMATION: 65-year-old female with newly diagnosed endometrioid endometrial adenocarcinoma. PET/CT is done as part of the initial treatment strategy evaluation.    TECHNIQUE:  Fasting blood sugar measured prior to injection of radiopharmaceutical was 104 mg/dl. Following intravenous injection of the radiopharmaceutical and an uptake period of approximately 50 minutes, FDG-PET/CT was obtained on a GE Discovery 710  scanner from the skull base to mid thigh. Oral contrast  was administered during the uptake period. CT was performed duringshallow respiration. The CT protocol was optimized for PET attenuation correction and to provide anatomic detail for localization of PET abnormalities. The CT protocol was not designed to produce and cannot replace state-of-the-art diagnostic CT images with specific imaging protocols for different body parts and indications. Images were reviewed on a dedicated workstation using multiplanar reconstruction.    The standardized uptake values (SUV) are normalized to patient body weight and indicate the highest activity concentration (SUVmax) in a given disease site. All image numbers refer to axial image number.    COMPARISON:  No prior PET/CT    OTHER STUDIES USED FOR CORRELATION: Pelvic ultrasound February 20, 2020    FINDINGS:    HEAD/NECK: Non-FDG avid heterogeneous enlargement of the left thyroid lobe with several small calcifications approximately 3.7 x 3.1 cm (image 45). FDG avid right intraparotid soft tissue nodule 0.7 cm (SUV 8.6; image 25). Mucous retention cyst or polyp right maxillary antrum. Physiologic FDG activity seen in the visualized portions of the brain, major salivary glands and neck muscles.    THORAX:  No abnormal avidity.Physiologic FDG activity in the myocardium and blood pool.    LUNGS:  No FDG avid foci.  No pulmonary nodules.    PLEURA/PERICARDIUM: No abnormal avidity. No pericardial or pleural effusions.    HEPATOBILIARY/PANCREAS:  Liver background SUV mean as a reference for comparing studies is 3.9.    SPLEEN: No abnormal avidity.    ADRENAL GLANDS: No abnormal avidity.    KIDNEYS/URINARY BLADDER: Excreted activity is seen.   No abnormal avidity.    ABDOMINOPELVIC NODES:   No abnormal avidity.    BOWEL/PERITONEUM/MESENTERY:  FDG avidity in the terminal ileum (SUV 8.4; image 159) with suggestion of nonopacified intraluminal soft tissue additional difficult to delineate area of FDG avidity in the region of the hepatic flexure (SUV 7.1; image 133). Surgical clips or fiducial markers associated with the stomach (images 115 and 118).    PELVIC ORGANS: Endometrial FDG avidity (SUV 18.2; image 201).    BONES:  Degenerative changes in the spine. Levoscoliotic lumbar spine curvature.    SOFT TISSUES:  No abnormal avidity. Postsurgical changes of anterior abdominal wall hernia repair with mesh with non-FDG avid soft tissue thickening in the anterior abdominal wall (image 140).    OTHER FINDINGS: Infrarenal IVC filter.    IMPRESSION:    1.  Endometrial FDG avidity consistent with known malignancy.    2.  FDG avidity of the terminal ileum which is nonspecific and possibly physiologic. Additional difficult to delineate focal FDG avidity hepatic flexure. Suggest correlation with colonoscopy.    3.  FDG avid 0.7 cm right intraparotid soft tissue nodule is indeterminate and may represent benign or malignant entities. Suggest evaluation with ultrasound.    4.  Enlarged left thyroid lobe with small calcifications. Ultrasound evaluation suggested.          < end of copied text >  <

## 2020-09-09 NOTE — PROGRESS NOTE ADULT - SUBJECTIVE AND OBJECTIVE BOX
HPI:  65 year old female with h/o A fib on Eliquis, autoimmune hepatitis with cirrhosis ( dx at age 29) s/p liver transplant in 2004 present to PST with c/o newly diagnosed endometrial adenocarcinoma s/p EMB on 7/29/20 that revealed FIGO grade 2 endometrioid adenocarcinoma, MSI intact.  Patient state she has a prolapse  uterus and occasionally she would notice spotting once a month.  She went to the GYN had a  pap smear which was noted with abnormal cells as well. She state she also had EMB, pet/ct and lower extremity dopplers.  She is now schedule for total vaginal hysterectomy bilateral salpingo oophorectomy, cystoscopy.     Pet/ct 8/19/2020- endometrial FDG avidity consistent with known malignancy. FDG avidity of the terminal ileum which is nonspecific and possibly physiologic. Additional difficult to delineate focal FDG avidity hepatic flexure. FDG avid 0.7 cm right intraparotid soft tissue nodule is indeterminate and may represent benign or malignant entities. Enlarged left thyroid lobe with small calcifications.  US DPLX LWR EXT doppler no evidence of DVT. (31 Aug 2020 14:33)     Allergies    No Known Allergies    Intolerances    Cheese (Vomiting)  codeine (Vomiting; Nausea)    Anemia  History of pseudogout  Lymphedema of leg  Lymph edema  Scoliosis  Squamous cell skin cancer  DVT (deep venous thrombosis)  Skin callus  Autoimmune hepatitis  Liver disease  Edema  GERD (gastroesophageal reflux disease)  HLD (hyperlipidemia)  HTN (hypertension)  Afib    MEDICATIONS  (STANDING):  enoxaparin Injectable 40 milliGRAM(s) SubCutaneous daily  furosemide    Tablet 20 milliGRAM(s) Oral daily  influenza   Vaccine 0.5 milliLiter(s) IntraMuscular once  metoprolol succinate ER 25 milliGRAM(s) Oral two times a day  pantoprazole    Tablet 40 milliGRAM(s) Oral before breakfast  predniSONE   Tablet 15 milliGRAM(s) Oral two times a day  sacubitril 24 mG/valsartan 26 mG 1 Tablet(s) Oral two times a day  tacrolimus 3 milliGRAM(s) Oral every 12 hours    MEDICATIONS  (PRN):  acetaminophen   Tablet .. 975 milliGRAM(s) Oral every 6 hours PRN Mild Pain (1 - 3)  benzocaine 15 mG/menthol 3.6 mG (Sugar-Free) Lozenge 1 Lozenge Oral three times a day PRN Sore Throat  HYDROmorphone   Tablet 2 milliGRAM(s) Oral every 6 hours PRN Moderate Pain (4 - 6)  HYDROmorphone   Tablet 4 milliGRAM(s) Oral every 6 hours PRN Severe Pain (7 - 10)  HYDROmorphone  Injectable 1 milliGRAM(s) IV Push every 30 minutes PRN Severe Pain (7 - 10)  HYDROmorphone  Injectable 0.5 milliGRAM(s) IV Push every 15 minutes PRN Severe Pain (7 - 10)  ondansetron Injectable 4 milliGRAM(s) IV Push every 6 hours PRN Nausea and/or Vomiting                           11.8   12.94 )-----------( 243      ( 09 Sep 2020 06:19 )             39.0     09-09    133<L>  |  101  |  19.0  ----------------------------<  205<H>  5.4<H>   |  24.0  |  0.80    Ca    8.6      09 Sep 2020 06:19        ;  Vital Signs Last 24 Hrs  T(C): 36.1 (09 Sep 2020 07:15), Max: 36.4 (08 Sep 2020 18:00)  T(F): 97 (09 Sep 2020 07:15), Max: 97.6 (08 Sep 2020 18:00)  HR: 70 (09 Sep 2020 07:15) (70 - 100)  BP: 108/80 (09 Sep 2020 07:15) (108/80 - 137/88)  BP(mean): --  RR: 18 (09 Sep 2020 07:15) (13 - 27)  SpO2: 95% (09 Sep 2020 07:15) (95% - 97%)  CAPILLARY BLOOD GLUCOSE      09-08 @ 07:01  -  09-09 @ 07:00  --------------------------------------------------------  IN: 0 mL / OUT: 475 mL / NET: -475 mL    09-09 @ 07:01  -  09-09 @ 14:48  --------------------------------------------------------  IN: 0 mL / OUT: 650 mL / NET: -650 mL      Patient feeling better No CP, No SOB, No N/V minimal pain  HEENT: PEARLA  Neck: Supple  Cardio: S1 S2 No Murmur Irregular  Pulm: CTA No Rales or Ronchi  Abd: Soft NT ND BS+  Rectal Breast Pelvic- refused  Ext: No DCT Dharmesh Edema  Skin: No Rash  Neuro: Awake Pleasant    Endometrial Ca - hysterotomy, pain  control   Hyponatremia - mild asymptomatic will fluid restrict follow up labs with PCP  Anemia - follow hb  Squamous cell skin cancer: neck face arms - out pt follow up   DVT (deep venous thrombosis): LLE  HTN (hypertension) - Diet metoprolol   Afib - rate control Metroprolol eloquis once surgically acceptable   DVT  - [place] filter in place  Autoimmune hepatitis:  / Liver transplant: 9/2007 -  immunosuppressant, low stress dose of steroids total 30 qd taper over a few days

## 2020-09-09 NOTE — DISCHARGE NOTE NURSING/CASE MANAGEMENT/SOCIAL WORK - PATIENT PORTAL LINK FT
You can access the FollowMyHealth Patient Portal offered by [facility] by registering at the following website: [email or web address]. By joining [employer]’s FollowMyHealth portal, you will also be able to view your health information using other applications (apps) compatible with our system.

## 2020-09-09 NOTE — DISCHARGE NOTE NURSING/CASE MANAGEMENT/SOCIAL WORK - NSDCFUADDAPPT_GEN_ALL_CORE_FT
Please follow-up with PA in one week for post-op visit via TELEHEALTH.   Follow-up with Dr. Paredes in two weeks to review pathology report.    ACTIVITY  May walk and climb stairs as often as you would like, no vigorous activity, do not lift anything greater than 10lbs, nothing per vagina x 6 weeks, do not drive while on pain medication.

## 2020-09-09 NOTE — PROGRESS NOTE ADULT - ASSESSMENT
Patient is a 65 y.o w/ hx of AFIB, autoimmune hepatitis (s/p liver transplant) now s/p vaginal hysterectomy BSO, cystoscopy 2/2 to endometrioid adenomacarcinoma (FIGO grade 2), now POD#1. No acute overnight events.          PLAN:    Neuro: Toradol + PO pain meds prn   CV: Hx of AFIB. Will hold Eliquis and c/w Metoprolol ER 25mg QD.     PULM: Saturating well on room air.   ENDO: No active issues  GI: Hx of autoimmune hepatitis s/p liver transplant. C/W tacrolimus & prednisone. Regular diet.  Anti-emetics prn   : Licona still in place. Inappropriate UO overnight; likely fluid behind. Encourage continued PO hydration. WIll d/c licona in early afternoon per patient request.  Heme: Hx of DVT w/ IVC filter. Hgb/HCT: 13.33/43.8 --> 11.8/39.0.  C/w Lovenox QD+ SCDs in place. Aggressive ambulation for DVT ppx.    FEN: Fluids: 100cc/hr D5/NS/K+. Will d/c fluids once tolerating regular diet.   ID: WBC: 12.8 --> 12.94. Afebrile overnight. Will continue to monitor vitals closely.   Code status:   Dispo: Continue to monitor Patient is a 65 y.o w/ hx of AFIB, autoimmune hepatitis (s/p liver transplant) now s/p vaginal hysterectomy BSO, cystoscopy 2/2 to endometrioid adenomacarcinoma (FIGO grade 2), now POD#1. No acute overnight events.          PLAN:    Neuro: Toradol + PO pain meds prn   CV: Hx of AFIB. Will hold Eliquis and c/w Metoprolol ER 25mg QD.     PULM: Saturating well on room air.   ENDO: No active issues  GI: Hx of autoimmune hepatitis s/p liver transplant. C/W tacrolimus & prednisone. Regular diet.  Anti-emetics prn   : Licona still in place. Inappropriate UO overnight; likely fluid behind. Encourage continued PO hydration. WIll d/c licona in early afternoon per patient request.  Heme: Hx of DVT w/ IVC filter. Hgb/HCT: 13.33/43.8 --> 11.8/39.0.  C/w Lovenox QD+ SCDs in place. Aggressive ambulation for DVT ppx.    FEN: Fluids: 100cc/hr D5/NS/K+. Will d/c fluids once tolerating regular diet.   ID: WBC: 12.8 --> 12.94. Afebrile overnight. Will continue to monitor vitals closely.   Code status: full code  Dispo: Continue to monitor

## 2020-09-11 LAB — SURGICAL PATHOLOGY STUDY: SIGNIFICANT CHANGE UP

## 2020-09-18 ENCOUNTER — APPOINTMENT (OUTPATIENT)
Dept: GYNECOLOGIC ONCOLOGY | Facility: CLINIC | Age: 65
End: 2020-09-18
Payer: COMMERCIAL

## 2020-09-18 ENCOUNTER — APPOINTMENT (OUTPATIENT)
Dept: GYNECOLOGIC ONCOLOGY | Facility: CLINIC | Age: 65
End: 2020-09-18

## 2020-09-18 DIAGNOSIS — Z08 ENCOUNTER FOR FOLLOW-UP EXAMINATION AFTER COMPLETED TREATMENT FOR MALIGNANT NEOPLASM: ICD-10-CM

## 2020-09-18 PROCEDURE — 99024 POSTOP FOLLOW-UP VISIT: CPT

## 2020-09-21 ENCOUNTER — APPOINTMENT (OUTPATIENT)
Dept: CARDIOLOGY | Facility: CLINIC | Age: 65
End: 2020-09-21
Payer: COMMERCIAL

## 2020-09-21 PROCEDURE — 99214 OFFICE O/P EST MOD 30 MIN: CPT | Mod: 95

## 2020-09-21 NOTE — REASON FOR VISIT
[Post Op] : post op visit [de-identified] : 9/8/2020 [de-identified] : total vaginal hysterectomy, BSO cysto [de-identified] : Patient states she is feeling great, she is ambulating, voiding, tolerating regular diet. Patient reports her pain has been well controlled, she required Dilaudid x 1 the night she was discharged from [facility]. Patient is a liver transplant recipient, she avoids aspirin and Tylenol products. She was discharged home with prednisone taper. She states she has been constipated which she attributes to prednisone dosing. She has taken senacot for constipation and reports she is becoming more regular. She is completed with her prednisone taper and is back to her original dose. She has completed Lovenox dosing for 3 days and is back to her home dose of Eliquis. Denies vaginal bleeding, SOB, CP, N/V, calf pain.

## 2020-09-21 NOTE — REASON FOR VISIT
[Home] : at home, [unfilled] , at the time of the visit. [Other Location: e.g. Home (Enter Location, City,State)___] : at [unfilled] [Verbal consent obtained from patient] : the patient, [unfilled] [Post Op] : post op visit [de-identified] : 9/8/2020 [de-identified] : Total vaginal hysterectomy, BSO, cystoscopy. [de-identified] : Patient states she is feeling great, she is ambulating, voiding, tolerating regular diet, . She reports her pain has been well controlled, she required dilaudid x 1 the night she was discharged from [facility] but has not needed pain medication since. She is a liver transplant recipient as such she avoids aspirin and tylenol products. She was discharged home with prednisone taper, which she has completed and now is back to her home dose. She admits to constipation, difficulty sleeping and now lethargy which she attributes to her prednisone taper. She states she has been taking Senokot for constipation and has become more regular. She is completed with her Lovenox dosing x 3 days and is now back to her home dose of Eliquis. Denies vaginal bleeding, SOB, CP, N/V, calf pain.

## 2020-09-21 NOTE — REASON FOR VISIT
[FreeTextEntry1] : TeleHealth Video Encounter\par Initiated by: Patient election for TeleHealth visit\par Patient was consented for TeleHealth visit\par Patient Location: Home\par Physician Location: Office ([address] [place] [address], [address], [place], [place], [address])\par Duration of Encounter: 30 minutes, at least 50% of which was spent in direct counseling and coordination of care.\par  \par Status post total hysterectomy, BSO, cystoscopy on 9/8/2020. She was discharged from [facility] on 9/9/2020. She transiently increased her steroids but then tapered back down. She has tolerated her Entresto 24-26 mg BID well.

## 2020-09-21 NOTE — HISTORY OF PRESENT ILLNESS
[FreeTextEntry1] : Patient is a 65 year-old woman known history of acute autoimmune hepatitis at age 30 with cirrhosis, status post liver transplant at [facility] in 2004, known cardiovascular risk factors of morbid obesity, DVT status post IVC filter, atrial fibrillation status post DCCV and catheter ablations, maintained on anticoagulation with apixaban, history of heart failure with reduced ejection fraction, LVEF 46% on TTE in January 2018, maintained on beta-blocker and loop diuretic, but not on ACEi/ARB/ARNI, now presents for cardiovascular evaluation prior to hysterectomy after a biopsy showed endometrial adenocarcinoma.\par \par Patient sleeps with one pillow at night. She does not have paroxysmal nocturnal dyspnea.\par Chronic lower extremity edema vs. lymphedema.\par \par PMD: Silvia Nash DO and Nayan Lee MD [phone]\par EP: Doron Byrd MD [phone]\par GI: Hoa Bangura MD [phone]\par Hepatologist: Kezia Foster MD [phone]\par Gyn: Lubna Kennedy MD [phone]\par Gyn-Onc: Rogers Paredes MD [phone]

## 2020-09-21 NOTE — ASSESSMENT
[FreeTextEntry1] : 66 yo female s/p TVH BSO cystpo 9/8/20 for endometrial cancer. Recovering well post operatively.

## 2020-09-21 NOTE — ASSESSMENT
[FreeTextEntry1] : 64 yo female s/p TVH BSO cysto 9/8/20 for endometrial cancer. Recovering well post operatively.

## 2020-09-25 ENCOUNTER — APPOINTMENT (OUTPATIENT)
Dept: GYNECOLOGIC ONCOLOGY | Facility: CLINIC | Age: 65
End: 2020-09-25
Payer: COMMERCIAL

## 2020-09-25 PROCEDURE — 99024 POSTOP FOLLOW-UP VISIT: CPT

## 2020-09-28 NOTE — REASON FOR VISIT
[Post Op] : post op visit [de-identified] : 9/8/2020 [de-identified] : Total Vaginal Hysterectomy, Bilateral Salpingo-oophorectomy, cystoscopy on 9/8/2020 at [facility]; Uterine prolapse.

## 2020-09-28 NOTE — END OF VISIT
[FreeTextEntry3] : Written by Andria Quinteros, acting as a scribe for Dr. Rogers Paredes \par This note accurately reflects the work and decisions made by me.

## 2020-09-28 NOTE — DISCUSSION/SUMMARY
[Findings] : These findings were discussed with [unfilled] in detail. She understood and accepted the rationale for this recommendation and also understood the serious impact that these findings could have upon her prognosis for survival. [Clean] : was clean [Dry] : was dry [Intact] : was intact [None] : had no drainage [Normal Skin] : normal appearance [Doing Well] : is doing well [Firm] : soft [Tender] : nontender [Abnormal Bowel Sounds] : normal bowel sounds [Rebound] : no rebound tenderness [Guarding] : no guarding [Incisional Hernia] : no incisional hernia [Mass] : no palpable mass [External Genitalia Abnormal] : normal external genitalia [Vaginal Exam Abnormal] : normal vaginal exam [de-identified] : Andria Quinteros Medical assistant chaperoned during gynecologic exam.  [FreeTextEntry1] : Pertinent findings revealed small anteverted uterus with  normal tubes and ovaries, grade 3 prolapse. Normal cystoscopy \par \par Final Pathology revealed Uterus, cervix hysterectomy: Uterus with endometrioid adenocarcinoma FIGO grade 2 involving both anterior and posterior endometrium and extending into the myometrium for a 1.3 cm, out of the total myometrial thickness of 1.8 cm. Focal lymphovascular invasion is identified. Negative uterine segment and negative Parametria. Left tube and ovary: Histologically unremarkable fallopian tube and ovary. Right tube and ovary: Histologically unremarkable fallopian tube and ovary.

## 2020-09-30 ENCOUNTER — APPOINTMENT (OUTPATIENT)
Dept: RADIATION ONCOLOGY | Facility: CLINIC | Age: 65
End: 2020-09-30
Payer: COMMERCIAL

## 2020-09-30 ENCOUNTER — OUTPATIENT (OUTPATIENT)
Dept: OUTPATIENT SERVICES | Facility: HOSPITAL | Age: 65
LOS: 1 days | Discharge: HOME | End: 2020-09-30
Payer: COMMERCIAL

## 2020-09-30 DIAGNOSIS — Z98.890 OTHER SPECIFIED POSTPROCEDURAL STATES: Chronic | ICD-10-CM

## 2020-09-30 DIAGNOSIS — M11.20 OTHER CHONDROCALCINOSIS, UNSPECIFIED SITE: Chronic | ICD-10-CM

## 2020-09-30 DIAGNOSIS — Z87.798 PERSONAL HISTORY OF OTHER (CORRECTED) CONGENITAL MALFORMATIONS: Chronic | ICD-10-CM

## 2020-09-30 DIAGNOSIS — Z95.828 PRESENCE OF OTHER VASCULAR IMPLANTS AND GRAFTS: Chronic | ICD-10-CM

## 2020-09-30 DIAGNOSIS — Z94.4 LIVER TRANSPLANT STATUS: Chronic | ICD-10-CM

## 2020-09-30 DIAGNOSIS — Z98.84 BARIATRIC SURGERY STATUS: Chronic | ICD-10-CM

## 2020-09-30 DIAGNOSIS — C55 MALIGNANT NEOPLASM OF UTERUS, PART UNSPECIFIED: ICD-10-CM

## 2020-09-30 PROCEDURE — 99205 OFFICE O/P NEW HI 60 MIN: CPT | Mod: 95

## 2020-10-08 NOTE — HISTORY OF PRESENT ILLNESS
[FreeTextEntry4] : Marti Wood [FreeTextEntry1] : 65 year old  woman seen today for consultation regarding possible adjuvant radiation for endometrial cancer.\par \par She reports that she has had persistent vaginal staining for many years, and did not know if she had entered menopause given the persistent bleeding.  She sought medical attention with Dr. Kennedy.\par \par Pelvic US on 20 revealed a thickened endometrial lining at 18mm, with simple appearing right adnexal cyst. \par \par Endometrial biopsy was performed on 20, with pathology showing endometrioid adenocarcinoma, FIGO grade 2, MSI intact.\par \par She was evaluated by Dr. Paredes on 2020.\par \par PET/CT scan 2020 showed:\par 1. Endometrial FDG avidity consistent with known malignancy. \par 2. FDG avidity of the terminal ileum which is nonspecific and possibly physiologic. Additional difficult to delineate focal FDG avidity hepatic flexure. Suggest correlation with colonoscopy. \par 3. FDG avid 0.7 cm right intraparotid soft tissue nodule is indeterminate and may represent benign or malignant entities. Suggest evaluation with ultrasound. \par 4. Enlarged left thyroid lobe with small calcifications. Ultrasound evaluation suggested.\par \par She underwent total vaginal hysterectomy, BSO, and cystoscopy on 2020.  Pathology showed endometrial adenocarcinoma, endometrioid type, FIGO grade 2.  Myometrial invasion 1.3 of 1.8 cm, >50%.  Focal lymphovascular invasion identified.  No lower uterine segment or cervical stromal involvement.  Bilateral fallopian tubes and ovaries were histologically unremarkable.  Given her uterine prolapse and negative PET/CT, sentinel lymph node dissection was not performed.\par \par She reports recovering well post-operatively.  She awaits evaluation of the left thyroid nodule by Dr. Camargo on 10/22/2020. Reports baseline bilateral lower extremity edema after prior fall and LLE DVT.  Denies current vaginal itch, vaginal discharge, change in bowel, change in urination, pain, fatigue or weight loss.\par

## 2020-10-08 NOTE — LETTER CLOSING
[FreeTextEntry3] : Иван Lizarraga MD\par Attending Physician\par Department of Radiation Medicine\par [facility], [facility]\par \par \par [facility] [employer] [facility]\par

## 2020-10-12 PROCEDURE — 77470 SPECIAL RADIATION TREATMENT: CPT | Mod: 26

## 2020-10-20 ENCOUNTER — APPOINTMENT (OUTPATIENT)
Dept: RADIATION ONCOLOGY | Facility: CLINIC | Age: 65
End: 2020-10-20
Payer: COMMERCIAL

## 2020-10-20 VITALS
HEART RATE: 95 BPM | HEIGHT: 65 IN | BODY MASS INDEX: 39.15 KG/M2 | WEIGHT: 235 LBS | DIASTOLIC BLOOD PRESSURE: 103 MMHG | OXYGEN SATURATION: 94 % | TEMPERATURE: 98 F | SYSTOLIC BLOOD PRESSURE: 137 MMHG

## 2020-10-20 PROCEDURE — 99072 ADDL SUPL MATRL&STAF TM PHE: CPT

## 2020-10-20 PROCEDURE — 77263 THER RADIOLOGY TX PLNG CPLX: CPT

## 2020-10-20 PROCEDURE — 77334 RADIATION TREATMENT AID(S): CPT | Mod: 26

## 2020-10-20 PROCEDURE — 77290 THER RAD SIMULAJ FIELD CPLX: CPT | Mod: 26

## 2020-10-20 PROCEDURE — 99214 OFFICE O/P EST MOD 30 MIN: CPT | Mod: 25

## 2020-10-20 NOTE — HISTORY OF PRESENT ILLNESS
[FreeTextEntry1] : 65 year old woman returns today for re-evaluation and radiation planning for endometrial cancer.  \par \par She denies vaginal itch or discharge, pelvic edema, change in bowel, change in urination, pain, fatigue or weight loss.\par \par Recall, she underwent total vaginal hysterectomy, BSO, and cystoscopy on 9/8/2020. Pathology showed endometrial adenocarcinoma, endometrioid type, FIGO grade 2. Myometrial invasion 1.3 of 1.8 cm, >50%. Focal lymphovascular invasion identified. No lower uterine segment or cervical stromal involvement. Bilateral fallopian tubes and ovaries were histologically unremarkable. Given her uterine prolapse and negative PET/CT, sentinel lymph node dissection was not performed.\par \par To see Dr. Camargo 10/22/2020 for evaluation of her Parotid.

## 2020-10-20 NOTE — DISEASE MANAGEMENT
[Pathological] : TNM Stage: p [IB] : IB [FreeTextEntry4] : endometrioid adenocarcinoma, FIGO grade 2. [NTNM] : x [MTNM] : 0 [TTNM] : 1b

## 2020-10-20 NOTE — PHYSICAL EXAM
[Extraocular Movements] : extraocular movements were intact [Outer Ear] : the ears and nose were normal in appearance [Heart Rate And Rhythm] : heart rate and rhythm were normal [Normal Vaginal Cuff] : vaginal cuff without lesion or nodularity [Normal] : normal external genitalia [No Spine Tenderness] : no tenderness to palpation of the vertebral spine [Normal] : oriented to person, place and time, the affect was normal, the mood was normal and not anxious [de-identified] : right parotid mass 1cm [de-identified] : gait aided by cane [de-identified] : Examination performed in the presence of a female chaperone. Surgical sutures in place.

## 2020-10-21 NOTE — HISTORY OF PRESENT ILLNESS
[de-identified] : Ms. JAYE CRISOSTOMO  is a 65 year  old female  presenting for an initial consultation for a right parotid nodule and left thyroid nodule seen on PET CT/ Neck US. \par \par Ms. Crisostomo was found to have endometrial thickening on pelvis US in 2/2020 and an endometrial biopsy performed in 7/2020 revealed an endometrioid adenocarcinoma, FIGO grade 2.   She was evaluated by Dr. Paredes on 8/12/2020 and was referred for a PET CT as part of the initial treatment strategy evaluation.  \par \par PET/CT scan 8/19/2020 showed:\par 1. Endometrial FDG avidity consistent with known malignancy. \par 2. FDG avidity of the terminal ileum which is nonspecific and possibly physiologic. Additional difficult to delineate focal FDG avidity hepatic flexure. Suggest correlation with colonoscopy. \par 3. FDG avid 0.7 cm right intraparotid soft tissue nodule is indeterminate and may represent benign or malignant entities. Suggest evaluation with ultrasound. \par 4. Enlarged left thyroid lobe with small calcifications. Ultrasound evaluation suggested.\par \par US head/neck performed on 9/5/2020 showed an indeterminate 9 mm right parotid nodule and a 5 cm heterogenous left thyroid nodule with microcalcifications for which biopsy is recommended. \par \par Subsequently, she underwent total vaginal hysterectomy, BSO, and cystoscopy on 9/8/2020. Pathology showed endometrial adenocarcinoma, endometrioid type, FIGO grade 2. Myometrial invasion 1.3 of 1.8 cm, >50%. Focal lymphovascular invasion identified. No lower uterine segment or cervical stromal involvement. Bilateral fallopian tubes and ovaries were histologically unremarkable. Given her uterine prolapse and negative PET/CT, sentinel lymph node dissection was not performed.\par \par She is scheduled to begin adjuvant XRT under the care of Dr. Иван Lizarraga.  \par \par Reports baseline bilateral lower extremity edema after prior fall and LLE DVT. 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 pseudogout, urosepsis (hospitalized 2/2020-2/18/2020), esophageal varices with bleeding. \par PSH: Liver biopsies, Liver transplant in 2004 (on Tacrolimus), excisions of squamous cell carcinomas of skin (right arm 2016 & 2018, right neck 2013, and above the right lip 2018), ventral hernia repair with mesh\par Social Hx: , 4 children, no alcohol use, former smoker \par Family Hx: Maternal grandmother, maternal aunt and paternal grandmother with breast cancer?\par \par PCP: Dr. Silvia Nash\par Cardiology (EP): Dr. Doron Byrd\par GI: Dr. Hoa Bangura\par Gynecology: Dr. Lubna Kennedy\par Gynecology Oncology: Dr. Rogers Paredes\par

## 2020-10-21 NOTE — REASON FOR VISIT
[Initial Consultation] : an initial consultation for [FreeTextEntry2] : right parotid nodule and left thyroid nodule

## 2020-10-21 NOTE — ASSESSMENT
[FreeTextEntry1] : 64 y/o female with PMHx of liver transplant in 2004 (on Tacrolimus), DVT, CAD, HF diagnosed with endometrial adenocarcinoma now s/p total vaginal hysterectomy, BSO on 9/8/2020. As part of initial staging, she underwent a PET CT in 8/2020 which incidentally revealed an FDG avid indeterminate 0.7 cm right intraparotid soft tissue nodule and an enlarged left thyroid lobe with small calcifications. US head/neck performed on 9/5/2020 showed an indeterminate 9 mm right parotid nodule and a 5 cm heterogenous left thyroid nodule with microcalcifications for which biopsy is recommended. \par \par PLAN:\par \par \par

## 2020-10-22 ENCOUNTER — APPOINTMENT (OUTPATIENT)
Dept: SURGICAL ONCOLOGY | Facility: CLINIC | Age: 65
End: 2020-10-22
Payer: COMMERCIAL

## 2020-10-22 VITALS
DIASTOLIC BLOOD PRESSURE: 85 MMHG | HEART RATE: 75 BPM | RESPIRATION RATE: 16 BRPM | BODY MASS INDEX: 38.82 KG/M2 | SYSTOLIC BLOOD PRESSURE: 137 MMHG | WEIGHT: 233 LBS | TEMPERATURE: 98.6 F | OXYGEN SATURATION: 97 % | HEIGHT: 65 IN

## 2020-10-22 DIAGNOSIS — E04.1 NONTOXIC SINGLE THYROID NODULE: ICD-10-CM

## 2020-10-22 DIAGNOSIS — K11.8 OTHER DISEASES OF SALIVARY GLANDS: ICD-10-CM

## 2020-10-22 PROCEDURE — 77770 HDR RDNCL NTRSTL/ICAV BRCHTX: CPT | Mod: 26

## 2020-10-22 PROCEDURE — 77332 RADIATION TREATMENT AID(S): CPT | Mod: 26

## 2020-10-22 PROCEDURE — 99244 OFF/OP CNSLTJ NEW/EST MOD 40: CPT

## 2020-10-22 PROCEDURE — 77317 BRACHYTX ISODOSE INTERMED: CPT | Mod: 26

## 2020-10-22 PROCEDURE — 99072 ADDL SUPL MATRL&STAF TM PHE: CPT

## 2020-10-22 PROCEDURE — 57156 INS VAG BRACHYTX DEVICE: CPT

## 2020-10-27 ENCOUNTER — OUTPATIENT (OUTPATIENT)
Dept: OUTPATIENT SERVICES | Facility: HOSPITAL | Age: 65
LOS: 1 days | End: 2020-10-27
Payer: COMMERCIAL

## 2020-10-27 ENCOUNTER — APPOINTMENT (OUTPATIENT)
Dept: ULTRASOUND IMAGING | Facility: CLINIC | Age: 65
End: 2020-10-27

## 2020-10-27 DIAGNOSIS — Z98.890 OTHER SPECIFIED POSTPROCEDURAL STATES: Chronic | ICD-10-CM

## 2020-10-27 DIAGNOSIS — Z94.4 LIVER TRANSPLANT STATUS: Chronic | ICD-10-CM

## 2020-10-27 DIAGNOSIS — Z98.84 BARIATRIC SURGERY STATUS: Chronic | ICD-10-CM

## 2020-10-27 DIAGNOSIS — E04.1 NONTOXIC SINGLE THYROID NODULE: ICD-10-CM

## 2020-10-27 DIAGNOSIS — M11.20 OTHER CHONDROCALCINOSIS, UNSPECIFIED SITE: Chronic | ICD-10-CM

## 2020-10-27 DIAGNOSIS — Z95.828 PRESENCE OF OTHER VASCULAR IMPLANTS AND GRAFTS: Chronic | ICD-10-CM

## 2020-10-27 DIAGNOSIS — Z87.798 PERSONAL HISTORY OF OTHER (CORRECTED) CONGENITAL MALFORMATIONS: Chronic | ICD-10-CM

## 2020-10-27 PROCEDURE — 76536 US EXAM OF HEAD AND NECK: CPT

## 2020-10-27 PROCEDURE — 76536 US EXAM OF HEAD AND NECK: CPT | Mod: 26

## 2020-10-29 PROCEDURE — 77332 RADIATION TREATMENT AID(S): CPT | Mod: 26

## 2020-10-29 PROCEDURE — 77770 HDR RDNCL NTRSTL/ICAV BRCHTX: CPT | Mod: 26

## 2020-10-29 PROCEDURE — 57156 INS VAG BRACHYTX DEVICE: CPT

## 2020-11-05 PROCEDURE — 77770 HDR RDNCL NTRSTL/ICAV BRCHTX: CPT | Mod: 26

## 2020-11-05 PROCEDURE — 77332 RADIATION TREATMENT AID(S): CPT | Mod: 26

## 2020-11-05 PROCEDURE — 57156 INS VAG BRACHYTX DEVICE: CPT

## 2020-11-11 ENCOUNTER — LABORATORY RESULT (OUTPATIENT)
Age: 65
End: 2020-11-11

## 2020-11-11 ENCOUNTER — NON-APPOINTMENT (OUTPATIENT)
Age: 65
End: 2020-11-11

## 2020-11-11 ENCOUNTER — APPOINTMENT (OUTPATIENT)
Dept: CARDIOLOGY | Facility: CLINIC | Age: 65
End: 2020-11-11
Payer: COMMERCIAL

## 2020-11-11 VITALS
WEIGHT: 235 LBS | SYSTOLIC BLOOD PRESSURE: 120 MMHG | DIASTOLIC BLOOD PRESSURE: 90 MMHG | BODY MASS INDEX: 39.11 KG/M2 | OXYGEN SATURATION: 97 % | TEMPERATURE: 97.3 F | HEART RATE: 103 BPM

## 2020-11-11 PROCEDURE — 36415 COLL VENOUS BLD VENIPUNCTURE: CPT

## 2020-11-11 PROCEDURE — 99215 OFFICE O/P EST HI 40 MIN: CPT | Mod: 25

## 2020-11-11 PROCEDURE — 93000 ELECTROCARDIOGRAM COMPLETE: CPT

## 2020-11-11 PROCEDURE — 99072 ADDL SUPL MATRL&STAF TM PHE: CPT

## 2020-11-11 PROCEDURE — 90682 RIV4 VACC RECOMBINANT DNA IM: CPT

## 2020-11-11 PROCEDURE — 90471 IMMUNIZATION ADMIN: CPT

## 2020-11-12 LAB
25(OH)D3 SERPL-MCNC: 28 NG/ML
ALBUMIN SERPL ELPH-MCNC: 3.6 G/DL
ALP BLD-CCNC: 328 U/L
ALT SERPL-CCNC: 29 U/L
ANION GAP SERPL CALC-SCNC: 9 MMOL/L
AST SERPL-CCNC: 37 U/L
BASOPHILS # BLD AUTO: 0.04 K/UL
BASOPHILS NFR BLD AUTO: 0.3 %
BILIRUB SERPL-MCNC: 0.6 MG/DL
BUN SERPL-MCNC: 14 MG/DL
CALCIUM SERPL-MCNC: 9.4 MG/DL
CHLORIDE SERPL-SCNC: 103 MMOL/L
CHOLEST SERPL-MCNC: 176 MG/DL
CO2 SERPL-SCNC: 25 MMOL/L
CREAT SERPL-MCNC: 0.84 MG/DL
EOSINOPHIL # BLD AUTO: 0.1 K/UL
EOSINOPHIL NFR BLD AUTO: 0.8 %
ESTIMATED AVERAGE GLUCOSE: 171 MG/DL
GLUCOSE SERPL-MCNC: 155 MG/DL
HBA1C MFR BLD HPLC: 7.6 %
HCT VFR BLD CALC: 43 %
HDLC SERPL-MCNC: 77 MG/DL
HGB BLD-MCNC: 13.7 G/DL
IMM GRANULOCYTES NFR BLD AUTO: 0.3 %
LDLC SERPL CALC-MCNC: 85 MG/DL
LDLC SERPL DIRECT ASSAY-MCNC: 91 MG/DL
LYMPHOCYTES # BLD AUTO: 2.47 K/UL
LYMPHOCYTES NFR BLD AUTO: 20.4 %
MAN DIFF?: NORMAL
MCHC RBC-ENTMCNC: 29.8 PG
MCHC RBC-ENTMCNC: 31.9 GM/DL
MCV RBC AUTO: 93.5 FL
MONOCYTES # BLD AUTO: 0.85 K/UL
MONOCYTES NFR BLD AUTO: 7 %
NEUTROPHILS # BLD AUTO: 8.58 K/UL
NEUTROPHILS NFR BLD AUTO: 71.2 %
NONHDLC SERPL-MCNC: 99 MG/DL
PLATELET # BLD AUTO: 275 K/UL
POTASSIUM SERPL-SCNC: 4.8 MMOL/L
PROT SERPL-MCNC: 6.5 G/DL
RBC # BLD: 4.6 M/UL
RBC # FLD: 15.1 %
SARS-COV-2 IGG SERPL IA-ACNC: 0.09 INDEX
SARS-COV-2 IGG SERPL QL IA: NEGATIVE
SODIUM SERPL-SCNC: 137 MMOL/L
TRIGL SERPL-MCNC: 67 MG/DL
TSH SERPL-ACNC: 0.07 UIU/ML
WBC # FLD AUTO: 12.08 K/UL

## 2020-11-12 NOTE — DISCUSSION/SUMMARY
[FreeTextEntry1] : Patient is a 65 year-old liver transplant recipient with cardiovascular history as above including a history of heart failure with reduced ejection fraction, chronic LE edema, atrial fibrillation on anticoagulation, and DVT (after trauma) status post IVC filter. \par \par In light of significant peripheral edema on exam, would continue furosemide for diuresis. Increase to 80 mg BID for three days, then 40 mg BID.\par Uptitrate Entresto to  mg BID.\par Plan to add SGLT-2 inhibitor for diabetic with cardiomyopathy and volume overload.\par \par Consider Vascular Cardiology evaluation for retrieval of IVC filter and/or evaluation of anasarca/lymphedema which could be obstructive (from filter, or from endometrial ca).

## 2020-11-12 NOTE — HISTORY OF PRESENT ILLNESS
[FreeTextEntry1] : Patient is a 65 year-old woman known history of acute autoimmune hepatitis at age 30 with cirrhosis, status post liver transplant at [facility] in 2004, known cardiovascular risk factors of morbid obesity, DVT status post IVC filter, atrial fibrillation status post DCCV and catheter ablations, maintained on anticoagulation with apixaban, history of heart failure with reduced ejection fraction, LVEF 46% on TTE in January 2018, maintained on beta-blocker and loop diuretic, but not on ACEi/ARB/ARNI, now presents for cardiovascular evaluation prior to hysterectomy after a biopsy showed endometrial adenocarcinoma.\par \par Patient sleeps with one pillow at night. She does not have paroxysmal nocturnal dyspnea.\par Chronic lower extremity edema vs. lymphedema.\par \par September 2020 - Status post total hysterectomy, BSO, cystoscopy on 9/8/2020. She was discharged from [facility] on 9/9/2020. She transiently increased her steroids but then tapered back down. She has tolerated her Entresto 24-26 mg BID well.\par \par PMD: Silvia Nash DO and Nayan Lee MD [phone]\par EP: Doron Byrd MD [phone]\par GI: Hoa Bangura MD [phone]\par Hepatologist: Kezia Foster MD [phone]\par Gyn: Lubna Kennedy MD [phone]\par Gyn-Onc: Rogers Paredes MD [phone]

## 2020-11-12 NOTE — REASON FOR VISIT
[Follow-Up - Clinic] : a clinic follow-up of [Cardiomyopathy] : cardiomyopathy [FreeTextEntry1] : November 2020 - Patient returns today for follow-up. She has been tolerating Entresto 49-51 mg BID, with frequent TID, but some days she decides against the third dose because of symptoms that she associates with her surgery and radiation therapy, including diarrhea.

## 2020-11-12 NOTE — PHYSICAL EXAM
[General Appearance - In No Acute Distress] : no acute distress [Normal Oral Mucosa] : normal oral mucosa [No Oral Pallor] : no oral pallor [No Oral Cyanosis] : no oral cyanosis [Normal Oropharynx] : normal oropharynx [Normal Jugular Venous V Waves Present] : normal jugular venous V waves present [] : no respiratory distress [Respiration, Rhythm And Depth] : normal respiratory rhythm and effort [Exaggerated Use Of Accessory Muscles For Inspiration] : no accessory muscle use [Auscultation Breath Sounds / Voice Sounds] : lungs were clear to auscultation bilaterally [Bowel Sounds] : normal bowel sounds [Abdomen Soft] : soft [Abdomen Tenderness] : non-tender [Nail Clubbing] : no clubbing of the fingernails [Cyanosis, Localized] : no localized cyanosis [Skin Color & Pigmentation] : normal skin color and pigmentation [No Xanthoma] : no  xanthoma was observed [Oriented To Time, Place, And Person] : oriented to person, place, and time [Impaired Insight] : insight and judgment were intact [Affect] : the affect was normal [Mood] : the mood was normal [No Anxiety] : not feeling anxious [Conjunctiva] : the conjunctiva were normal in both eyes [Normal] : the eyelids were normal bilaterally [PERRL] : pupils were equal in size, round, and reactive to light [EOM Intact] : extraocular movements were intact [Not Palpable] : not palpable [Tachycardia] : tachycardic [Irregularly Irregular] : irregularly irregular [Normal S1] : normal S1 [Normal S2] : normal S2 [No Murmur] : no murmurs heard [Anasarca] : anasarca edema present [FreeTextEntry1] : ambulates with cane for assist [Yellow Sclera (Icteric)] : no scleral icterus was seen

## 2020-11-19 ENCOUNTER — OUTPATIENT (OUTPATIENT)
Dept: OUTPATIENT SERVICES | Facility: HOSPITAL | Age: 65
LOS: 1 days | End: 2020-11-19

## 2020-11-19 ENCOUNTER — APPOINTMENT (OUTPATIENT)
Dept: ULTRASOUND IMAGING | Facility: CLINIC | Age: 65
End: 2020-11-19

## 2020-11-19 DIAGNOSIS — Z94.4 LIVER TRANSPLANT STATUS: Chronic | ICD-10-CM

## 2020-11-19 DIAGNOSIS — Z98.890 OTHER SPECIFIED POSTPROCEDURAL STATES: Chronic | ICD-10-CM

## 2020-11-19 DIAGNOSIS — M11.20 OTHER CHONDROCALCINOSIS, UNSPECIFIED SITE: Chronic | ICD-10-CM

## 2020-11-19 DIAGNOSIS — Z95.828 PRESENCE OF OTHER VASCULAR IMPLANTS AND GRAFTS: Chronic | ICD-10-CM

## 2020-11-19 DIAGNOSIS — Z98.84 BARIATRIC SURGERY STATUS: Chronic | ICD-10-CM

## 2020-11-19 DIAGNOSIS — Z87.798 PERSONAL HISTORY OF OTHER (CORRECTED) CONGENITAL MALFORMATIONS: Chronic | ICD-10-CM

## 2020-11-23 ENCOUNTER — NON-APPOINTMENT (OUTPATIENT)
Age: 65
End: 2020-11-23

## 2020-11-30 ENCOUNTER — APPOINTMENT (OUTPATIENT)
Dept: ULTRASOUND IMAGING | Facility: CLINIC | Age: 65
End: 2020-11-30

## 2020-12-01 ENCOUNTER — APPOINTMENT (OUTPATIENT)
Dept: RADIATION ONCOLOGY | Facility: CLINIC | Age: 65
End: 2020-12-01

## 2020-12-01 NOTE — HISTORY OF PRESENT ILLNESS
[FreeTextEntry1] : 65 year old female seen  today via Telehealth is  s/p 2,100cGY for a [id number] lB endometrioid carcinoma completed 11/5/2020.   vaginal itch, discharge, edema, change in bowel, change in urination, pain,fatigue & weight loss\par \par s/p 9/8/2020 Total vaginal hysterectomy, BSO, cystoscopy:\par Final Diagnosis\par 1.Uterus, cervix, hysterectomy:\par - Cervix with nabothian cysts.\par - Uterus with endometrioid adenocarcinoma FIGO grade 2 involving both anterior and posterior endometrium and extendinginto the myometrium for a 1.3 cm, out of  the total\par myometrial thickness of 1.8 cm.\par -Focal lymphovascular invasion is identified.\par - Negative lower uterine segment and  negative Parametria.\par 2. Left tube and ovary:\par - Histologically unremarkable fallopian tube and ovary.\par 3. Right tube and ovary:\par - Histologically unremarkable fallopian tube and ovary.\par x_ Simple hysterectomy\par _x__ Bilateral salpingo-oophorectomy\par + Tumor Site (select all that apply)\par + __x_ Endometrium and endom polyp.\par + Tumor Size\par + Greatest dimension: _3.7__ cm\par Histologic Type (Note B)\par __x_ Endometrioid carcinoma, NOS\par Histologic Grade (required only if applicable) (Not_x__ FIGO grade 2\par Myometrial Invasion (Note D)\par __x_ Present\par Depth of Myometrial Invasion (millimeters): _1.3__ mm\par Myometrial Thickness (millimeters): _1.6__ mm\par Percentage of Myometrial Invasion: _80___%\par + Adenomyosis\par + _x__ Present, uninvolved by carcinoma\par Uterine Serosa Involvement\par _x__ Not identified\par + Peritoneal/ Ascitic Fluid (Note G)\par + __x_ Not submitted/unknown\par Parametrial/Paracervical Margin\par __x__ Uninvolved by carcinoma.\par + Distance of invasive carcinoma from margin (millimeters): ___mm\par Lymphovascular Invasion\par _x__ Present\par Regional Lymph Nodes\par __x_ No lymph nodes submitted or found\par Primary Tumor (pT)\par __x_ pT1b: Tumor invading one-half or more of the myometrium\par Regional Lymph Nodes (pN)\par __x_ pNX: Regional lymph nodes cannot be assessed\par + FIGO Stage (2018 FIGO Cancer Report)\par + _x__ IB: Invasion equal to or more than half of the myometrium\par Dr Paredes 1/8/21 DR Camargo 12/3/2020 DR Mosley (card) 11/11/2020\par U/S thyroid 10/27/2020:\par IMPRESSION:\par A 5 cm left thyroid nodule with associated calcifications is unchanged from recent exam. Percutaneous FNA is advised, if not already performed.\par Multiple indeterminate right thyroid nodules. Continued follow-up is advised.\par Stable nodule associated with the right parotid gland

## 2020-12-01 NOTE — HISTORY OF PRESENT ILLNESS
[FreeTextEntry1] : 65 year old female seen  today via Telehealth is  s/p 2,100cGY for a [id number] lB endometrioid carcinoma completed 11/5/2020.   vaginal itch, discharge, edema, change in bowel, change in urination, pain,fatigue & weight loss\par \par s/p 9/8/2020 Total vaginal hysterectomy, BSO, cystoscopy:\par Final Diagnosis\par 1.Uterus, cervix, hysterectomy:\par - Cervix with nabothian cysts.\par - Uterus with endometrioid adenocarcinoma FIGO grade 2 involving both anterior and posterior endometrium and extendinginto the myometrium for a 1.3 cm, out of  the total\par myometrial thickness of 1.8 cm.\par -Focal lymphovascular invasion is identified.\par - Negative lower uterine segment and  negative Parametria.\par 2. Left tube and ovary:\par - Histologically unremarkable fallopian tube and ovary.\par 3. Right tube and ovary:\par - Histologically unremarkable fallopian tube and ovary.\par x_ Simple hysterectomy\par _x__ Bilateral salpingo-oophorectomy\par + Tumor Site (select all that apply)\par + __x_ Endometrium and endom polyp.\par + Tumor Size\par + Greatest dimension: _3.7__ cm\par Histologic Type (Note B)\par __x_ Endometrioid carcinoma, NOS\par Histologic Grade (required only if applicable) (Not_x__ FIGO grade 2\par Myometrial Invasion (Note D)\par __x_ Present\par Depth of Myometrial Invasion (millimeters): _1.3__ mm\par Myometrial Thickness (millimeters): _1.6__ mm\par Percentage of Myometrial Invasion: _80___%\par + Adenomyosis\par + _x__ Present, uninvolved by carcinoma\par Uterine Serosa Involvement\par _x__ Not identified\par + Peritoneal/ Ascitic Fluid (Note G)\par + __x_ Not submitted/unknown\par Parametrial/Paracervical Margin\par __x__ Uninvolved by carcinoma.\par + Distance of invasive carcinoma from margin (millimeters): ___mm\par Lymphovascular Invasion\par _x__ Present\par Regional Lymph Nodes\par __x_ No lymph nodes submitted or found\par Primary Tumor (pT)\par __x_ pT1b: Tumor invading one-half or more of the myometrium\par Regional Lymph Nodes (pN)\par __x_ pNX: Regional lymph nodes cannot be assessed\par + FIGO Stage (2018 FIGO Cancer Report)\par + _x__ IB: Invasion equal to or more than half of the myometrium\par Dr Paredes 1/8/21 DR Camargo 12/3/2020 DR Mosley (card) 11/11/2020\par U/S thyroid 10/27/2020:\par IMPRESSION:\par A 5 cm left thyroid nodule with associated calcifications is unchanged from recent exam. Percutaneous FNA is advised, if not already performed.\par Multiple indeterminate right thyroid nodules. Continued follow-up is advised.\par Stable nodule associated with the right parotid gland

## 2020-12-01 NOTE — DISEASE MANAGEMENT
[Pathological] : TNM Stage: p [FreeTextEntry4] : endometrioid adenocarcinoma, FIGO grade 2. [TTNM] : 1b [NTNM] : x [MTNM] : 0 [IB] : IB

## 2020-12-01 NOTE — DISCUSSION/SUMMARY
[Cancer Type / Location / Histology Subtype: ________] : Cancer Type / Location / Histology Subtype: [unfilled] [Diagnosis Date (year): ____] : Diagnosis Date (year): [unfilled] [I] : I [Surgery] : Surgery: Yes [Surgery Date(s) (year): ____] : Surgery Date(s) (year): [unfilled] [Surgical Procedure / Location / Findings: _________] : Surgical Procedure / Location / Findings: [unfilled] [Radiation] : Radiation: Yes [Body Area Treated: _________] : Body Area Treated: [unfilled] [End Date (year): ____] : End Date (year): [unfilled] [Follow up with Radiation MD in _____] : Follow up with Radiation MD in [unfilled] [Systemic Therapy (chemotherapy, hormonal therapy, other)] : Systemic Therapy (chemotherapy, hormonal therapy, other): No [Persistent symptoms or side effects at completion of treatment?  If Yes, list types ___] : Persistent symptoms or side effects at completion of treatment? No [Need for onging (adjuvant) treatment for cancer?] : Need for onging (adjuvant) treatment for cancer? No [Follow up with Surgeon in _____] : Follow up with Surgeon in [unfilled] [Primary care physician] : primary care physician [Mammogram] : Mammogram [Sexual Function] : sexual function [Sexual Functioning] : Sexual functioning [FreeTextEntry8] : Marti Wood [FreeTextEntry9] : 12/1/2020

## 2020-12-03 ENCOUNTER — APPOINTMENT (OUTPATIENT)
Dept: SURGICAL ONCOLOGY | Facility: CLINIC | Age: 65
End: 2020-12-03

## 2020-12-10 ENCOUNTER — APPOINTMENT (OUTPATIENT)
Dept: FAMILY MEDICINE | Facility: CLINIC | Age: 65
End: 2020-12-10
Payer: COMMERCIAL

## 2020-12-10 ENCOUNTER — RX RENEWAL (OUTPATIENT)
Age: 65
End: 2020-12-10

## 2020-12-10 ENCOUNTER — APPOINTMENT (OUTPATIENT)
Dept: CARDIOLOGY | Facility: CLINIC | Age: 65
End: 2020-12-10

## 2020-12-10 VITALS — HEART RATE: 96 BPM

## 2020-12-10 VITALS
BODY MASS INDEX: 37.32 KG/M2 | TEMPERATURE: 97.5 F | OXYGEN SATURATION: 94 % | HEIGHT: 65 IN | SYSTOLIC BLOOD PRESSURE: 130 MMHG | DIASTOLIC BLOOD PRESSURE: 80 MMHG | WEIGHT: 224 LBS | HEART RATE: 114 BPM

## 2020-12-10 PROCEDURE — 99214 OFFICE O/P EST MOD 30 MIN: CPT | Mod: 25

## 2020-12-10 PROCEDURE — 99072 ADDL SUPL MATRL&STAF TM PHE: CPT

## 2020-12-10 PROCEDURE — 36415 COLL VENOUS BLD VENIPUNCTURE: CPT

## 2020-12-10 NOTE — REVIEW OF SYSTEMS
[Fever] : no fever [Abdominal Pain] : no abdominal pain [Nausea] : nausea [Constipation] : no constipation [Diarrhea] : diarrhea [Vomiting] : no vomiting [Heartburn] : no heartburn [Melena] : no melena [Negative] : Heme/Lymph

## 2020-12-10 NOTE — HISTORY OF PRESENT ILLNESS
[FreeTextEntry8] : cardiologist thinks she has c diff and told her to be seen asap\par  after thanksgiving started with diarrhea - on and off 2 weeks watery stools - states inbetween was starting to have formed stools and then started again with diarrhea \par recently started on entresto on 11/12 talked to cardio - stopped for 3 days to see if that caused it \par \par no chest pain, no sob, no cough, no fever, no dizziness, no abdominal pain, no v//c/melena/brbpr/hematuria/dysuria\par no recent antibiotic \par recently finished 11/5 radiation for uterine cancer - \par \par  green stools noticed today - normally brown\par  Dr ferrell \par

## 2020-12-10 NOTE — PHYSICAL EXAM
[No Acute Distress] : no acute distress [Well Nourished] : well nourished [Well Developed] : well developed [Well-Appearing] : well-appearing [Normal Voice/Communication] : normal voice/communication [Normal Sclera/Conjunctiva] : normal sclera/conjunctiva [Normal Outer Ear/Nose] : the outer ears and nose were normal in appearance [No JVD] : no jugular venous distention [No Respiratory Distress] : no respiratory distress  [No Accessory Muscle Use] : no accessory muscle use [Clear to Auscultation] : lungs were clear to auscultation bilaterally [Normal Rate] : normal rate  [Regular Rhythm] : with a regular rhythm [Normal S1, S2] : normal S1 and S2 [No Murmur] : no murmur heard [Soft] : abdomen soft [Non Tender] : non-tender [Non-distended] : non-distended [No Masses] : no abdominal mass palpated [No HSM] : no HSM [Normal Bowel Sounds] : normal bowel sounds [No CVA Tenderness] : no CVA  tenderness [No Spinal Tenderness] : no spinal tenderness [No Joint Swelling] : no joint swelling [Grossly Normal Strength/Tone] : grossly normal strength/tone [No Rash] : no rash [Coordination Grossly Intact] : coordination grossly intact [No Focal Deficits] : no focal deficits [Normal Gait] : normal gait [Speech Grossly Normal] : speech grossly normal [Normal Affect] : the affect was normal [Alert and Oriented x3] : oriented to person, place, and time [Normal Mood] : the mood was normal [Normal Insight/Judgement] : insight and judgment were intact [de-identified] : bilateral LE edema

## 2020-12-11 ENCOUNTER — LABORATORY RESULT (OUTPATIENT)
Age: 65
End: 2020-12-11

## 2020-12-11 LAB
ALBUMIN SERPL ELPH-MCNC: 3.4 G/DL
ALP BLD-CCNC: 334 U/L
ALT SERPL-CCNC: 43 U/L
ANION GAP SERPL CALC-SCNC: 14 MMOL/L
AST SERPL-CCNC: 51 U/L
BASOPHILS # BLD AUTO: 0.02 K/UL
BASOPHILS NFR BLD AUTO: 0.2 %
BILIRUB SERPL-MCNC: 1 MG/DL
BUN SERPL-MCNC: 33 MG/DL
CALCIUM SERPL-MCNC: 9.2 MG/DL
CHLORIDE SERPL-SCNC: 111 MMOL/L
CO2 SERPL-SCNC: 13 MMOL/L
CREAT SERPL-MCNC: 1.54 MG/DL
EOSINOPHIL # BLD AUTO: 0.05 K/UL
EOSINOPHIL NFR BLD AUTO: 0.5 %
GLUCOSE SERPL-MCNC: 115 MG/DL
HCT VFR BLD CALC: 45.9 %
HGB BLD-MCNC: 14.2 G/DL
IMM GRANULOCYTES NFR BLD AUTO: 0.4 %
LYMPHOCYTES # BLD AUTO: 1.62 K/UL
LYMPHOCYTES NFR BLD AUTO: 16 %
MAN DIFF?: NORMAL
MCHC RBC-ENTMCNC: 29.1 PG
MCHC RBC-ENTMCNC: 30.9 GM/DL
MCV RBC AUTO: 94.1 FL
MONOCYTES # BLD AUTO: 0.6 K/UL
MONOCYTES NFR BLD AUTO: 5.9 %
NEUTROPHILS # BLD AUTO: 7.8 K/UL
NEUTROPHILS NFR BLD AUTO: 77 %
PLATELET # BLD AUTO: 263 K/UL
POTASSIUM SERPL-SCNC: 4.5 MMOL/L
PROT SERPL-MCNC: 6.5 G/DL
RBC # BLD: 4.88 M/UL
RBC # FLD: 16 %
SARS-COV-2 IGG SERPL IA-ACNC: 0.06 INDEX
SARS-COV-2 IGG SERPL QL IA: NEGATIVE
SODIUM SERPL-SCNC: 138 MMOL/L
T3 SERPL-MCNC: 114 NG/DL
T3FREE SERPL-MCNC: 2.84 PG/ML
T3RU NFR SERPL: 0.9 TBI
T4 FREE SERPL-MCNC: 1.4 NG/DL
T4 SERPL-MCNC: 7.2 UG/DL
THYROGLOB AB SERPL-ACNC: <20 IU/ML
THYROPEROXIDASE AB SERPL IA-ACNC: <10 IU/ML
TSH SERPL-ACNC: 0.15 UIU/ML
WBC # FLD AUTO: 10.13 K/UL

## 2020-12-14 LAB — TSI ACT/NOR SER: <0.1 IU/L

## 2020-12-15 ENCOUNTER — APPOINTMENT (OUTPATIENT)
Dept: ENDOCRINOLOGY | Facility: CLINIC | Age: 65
End: 2020-12-15
Payer: COMMERCIAL

## 2020-12-15 ENCOUNTER — APPOINTMENT (OUTPATIENT)
Dept: FAMILY MEDICINE | Facility: CLINIC | Age: 65
End: 2020-12-15
Payer: COMMERCIAL

## 2020-12-15 VITALS
HEART RATE: 52 BPM | HEIGHT: 65 IN | OXYGEN SATURATION: 92 % | DIASTOLIC BLOOD PRESSURE: 82 MMHG | SYSTOLIC BLOOD PRESSURE: 132 MMHG | WEIGHT: 224 LBS | TEMPERATURE: 97.3 F | BODY MASS INDEX: 37.32 KG/M2

## 2020-12-15 VITALS
SYSTOLIC BLOOD PRESSURE: 120 MMHG | DIASTOLIC BLOOD PRESSURE: 84 MMHG | WEIGHT: 224 LBS | OXYGEN SATURATION: 98 % | HEIGHT: 65 IN | HEART RATE: 71 BPM | BODY MASS INDEX: 37.32 KG/M2 | TEMPERATURE: 97.6 F | RESPIRATION RATE: 16 BRPM

## 2020-12-15 DIAGNOSIS — R82.90 UNSPECIFIED ABNORMAL FINDINGS IN URINE: ICD-10-CM

## 2020-12-15 PROCEDURE — 99214 OFFICE O/P EST MOD 30 MIN: CPT

## 2020-12-15 PROCEDURE — 99204 OFFICE O/P NEW MOD 45 MIN: CPT

## 2020-12-15 PROCEDURE — 99072 ADDL SUPL MATRL&STAF TM PHE: CPT

## 2020-12-15 NOTE — REASON FOR VISIT
[Consultation] : a consultation visit [DM Type 2] : DM Type 2 [Thyroid nodule/ MNG] : thyroid nodule/ MNG [FreeTextEntry2] : Dr To

## 2020-12-15 NOTE — REVIEW OF SYSTEMS
[Diarrhea] : diarrhea [Negative] : Heme/Lymph [Fatigue] : no fatigue [Decreased Appetite] : appetite not decreased [Recent Weight Gain (___ Lbs)] : no recent weight gain [Recent Weight Loss (___ Lbs)] : no recent weight loss [Chest Pain] : no chest pain [Shortness Of Breath] : no shortness of breath [Pelvic Pain] : no pelvic pain [Polydipsia] : no polydipsia [FreeTextEntry7] : is improving

## 2020-12-15 NOTE — PHYSICAL EXAM
[Alert] : alert [No Acute Distress] : no acute distress [Normal Sclera/Conjunctiva] : normal sclera/conjunctiva [PERRL] : pupils equal, round and reactive to light [Normal Outer Ear/Nose] : the ears and nose were normal in appearance [No Respiratory Distress] : no respiratory distress [Regular Rhythm] : with a regular rhythm [No Tremors] : no tremors [Normal Sensation on Monofilament Testing] : normal sensation on monofilament testing of lower extremities [Oriented x3] : oriented to person, place, and time [Normal Insight/Judgement] : insight and judgment were intact [Kyphosis] : no kyphosis present [de-identified] : L soft 4 cm thy nodule, mobile; ant neck scar- had thyroglosal cyst removed at age 7

## 2020-12-15 NOTE — HISTORY OF PRESENT ILLNESS
[FreeTextEntry1] : The patient is here for Type 2 diabetes and A thyroid nodule\par Her Diabetes was diagnosed  1 week ago. She started  a low carb diet this week, but did have regular soda\par FH- mother-+ Diabetes\par Her wt has been stable\par She has been on prednisone for liver disease since age 27\par  she will be starting Tradjenta tommorow\par Labs show an A1c of- 7.6\par \par The pt has a hx of a Left thyroid nodule- 4.9 cm- with punctate calcifications\par TSH- 0.15 and FT4- 1.4 [Finger Stick] : Finger Stick: No [Hypoglycemia] : Patient is not hypoglycemic.

## 2020-12-15 NOTE — ASSESSMENT
[FreeTextEntry1] : 1- the pt will reschedule the FNA of the L thy nodule- she states that even if cytology is benign , she wouil want surgery- Risks and benefits discussed\par 2- Diabetes ed done x 20 min- \par Eye and foot care discussed- she will see an pthomologist yrly and examine her feet daily\par 3- will start tradjenta as presribed\par Ret in 3mo

## 2020-12-17 LAB
ALBUMIN SERPL ELPH-MCNC: 3.5 G/DL
ALP BLD-CCNC: 334 U/L
ALT SERPL-CCNC: 40 U/L
ANION GAP SERPL CALC-SCNC: 12 MMOL/L
AST SERPL-CCNC: 48 U/L
BASOPHILS # BLD AUTO: 0.02 K/UL
BASOPHILS NFR BLD AUTO: 0.2 %
BILIRUB SERPL-MCNC: 0.6 MG/DL
BUN SERPL-MCNC: 49 MG/DL
CALCIUM SERPL-MCNC: 8.7 MG/DL
CHLORIDE SERPL-SCNC: 107 MMOL/L
CO2 SERPL-SCNC: 15 MMOL/L
CREAT SERPL-MCNC: 1.53 MG/DL
EOSINOPHIL # BLD AUTO: 0.01 K/UL
EOSINOPHIL NFR BLD AUTO: 0.1 %
GLUCOSE SERPL-MCNC: 120 MG/DL
HAV IGM SER QL: NONREACTIVE
HBV CORE IGM SER QL: NONREACTIVE
HBV SURFACE AG SER QL: NONREACTIVE
HCT VFR BLD CALC: 43.4 %
HCV AB SER QL: NONREACTIVE
HCV S/CO RATIO: 0.34 S/CO
HGB BLD-MCNC: 14.2 G/DL
IMM GRANULOCYTES NFR BLD AUTO: 0.2 %
LYMPHOCYTES # BLD AUTO: 1.8 K/UL
LYMPHOCYTES NFR BLD AUTO: 19.3 %
MAN DIFF?: NORMAL
MCHC RBC-ENTMCNC: 29.9 PG
MCHC RBC-ENTMCNC: 32.7 GM/DL
MCV RBC AUTO: 91.4 FL
MONOCYTES # BLD AUTO: 0.39 K/UL
MONOCYTES NFR BLD AUTO: 4.2 %
NEUTROPHILS # BLD AUTO: 7.1 K/UL
NEUTROPHILS NFR BLD AUTO: 76 %
PLATELET # BLD AUTO: 254 K/UL
POTASSIUM SERPL-SCNC: 4.2 MMOL/L
PROT SERPL-MCNC: 6.8 G/DL
RBC # BLD: 4.75 M/UL
RBC # FLD: 16.2 %
SODIUM SERPL-SCNC: 135 MMOL/L
WBC # FLD AUTO: 9.34 K/UL

## 2020-12-18 ENCOUNTER — APPOINTMENT (OUTPATIENT)
Dept: NEPHROLOGY | Facility: CLINIC | Age: 65
End: 2020-12-18
Payer: COMMERCIAL

## 2020-12-18 VITALS
HEART RATE: 50 BPM | OXYGEN SATURATION: 95 % | HEIGHT: 65 IN | SYSTOLIC BLOOD PRESSURE: 144 MMHG | DIASTOLIC BLOOD PRESSURE: 82 MMHG | WEIGHT: 224 LBS | TEMPERATURE: 97 F | BODY MASS INDEX: 37.32 KG/M2

## 2020-12-18 PROCEDURE — 99072 ADDL SUPL MATRL&STAF TM PHE: CPT

## 2020-12-18 PROCEDURE — 99244 OFF/OP CNSLTJ NEW/EST MOD 40: CPT

## 2020-12-18 RX ORDER — BLOOD-GLUCOSE METER
KIT MISCELLANEOUS
Qty: 1 | Refills: 0 | Status: ACTIVE | COMMUNITY
Start: 2020-12-10

## 2020-12-18 RX ORDER — LANCETS 28 GAUGE
EACH MISCELLANEOUS
Qty: 1 | Refills: 2 | Status: ACTIVE | COMMUNITY
Start: 2020-12-10

## 2020-12-18 NOTE — PHYSICAL EXAM
[General Appearance - Alert] : alert [General Appearance - In No Acute Distress] : in no acute distress [Sclera] : the sclera and conjunctiva were normal [PERRL With Normal Accommodation] : pupils were equal in size, round, and reactive to light [Strabismus] : no strabismus was seen [Outer Ear] : the ears and nose were normal in appearance [Neck Appearance] : the appearance of the neck was normal [Auscultation Breath Sounds / Voice Sounds] : lungs were clear to auscultation bilaterally [Heart Sounds] : normal S1 and S2 [FreeTextEntry1] : edema+ [Abdomen Soft] : soft [Abdomen Tenderness] : non-tender [No CVA Tenderness] : no ~M costovertebral angle tenderness [Abnormal Walk] : normal gait [] : no rash [No Focal Deficits] : no focal deficits [Oriented To Time, Place, And Person] : oriented to person, place, and time [Impaired Insight] : insight and judgment were intact

## 2020-12-18 NOTE — ASSESSMENT
[FreeTextEntry1] : Barber\par Liver transplant recipient\par CHF\par HTN\par lymphedema\par \par baseline Scr 0.6-0.8\par Scr 1.54 now\par Barber in setting of dehydration (diarrhea) + concomitant diuretic+ ARB use\par diarrheal illness now resolved\par take lasix 20 mg daily for worsening LE edema\par continue to hold entresto\par avoid NSAiDs\par repeat labs UA, tp/cr ratio i, FK levels in 2 weeks  and resume Entresto if renal indices improve\par Renal US if barber doesn’t resolve\par \par RTC in 2 weeks\par \par

## 2020-12-18 NOTE — HISTORY OF PRESENT ILLNESS
[FreeTextEntry1] : 65 year old woman with DM, HTN, HLD, Autoimmune transplant dx at age 30, liver transplant recipient in 2004,  thyroid cyst, squamous cell carcinoma, DVT LE after mechanical fall, s/p Lovenox. \par PCP; Dr Ruiz\par \par Pt seen and examined\par States she feels well\par No acute complaint\par Diarrhea since thanksgiving- was holding Lasix\par Entresto recently held\par \par Feels better\par now with formed stools\par able to eat and drink\par took lasix 40 mg yesterday\par off entresto

## 2020-12-19 ENCOUNTER — RX RENEWAL (OUTPATIENT)
Age: 65
End: 2020-12-19

## 2020-12-22 PROBLEM — E04.1 THYROID NODULE: Status: ACTIVE | Noted: 2018-06-21

## 2020-12-22 LAB
ALKPISO INTERP: NORMAL
ALP BLD-CCNC: 275 U/L
ALP BONE CFR SERPL: 25 %
ALP INTEST CFR SERPL: 4 %
ALP LIVER CFR SERPL: 71 %
ALP MACRO CFR SERPL: 0 %
ALP PLAC CFR SERPL: 0 %

## 2020-12-23 ENCOUNTER — RX RENEWAL (OUTPATIENT)
Age: 65
End: 2020-12-23

## 2021-01-06 ENCOUNTER — RX RENEWAL (OUTPATIENT)
Age: 66
End: 2021-01-06

## 2021-01-07 PROBLEM — C54.1 ENDOMETRIAL CANCER: Status: ACTIVE | Noted: 2020-08-12

## 2021-01-08 ENCOUNTER — APPOINTMENT (OUTPATIENT)
Dept: GYNECOLOGIC ONCOLOGY | Facility: CLINIC | Age: 66
End: 2021-01-08
Payer: COMMERCIAL

## 2021-01-08 VITALS — WEIGHT: 224 LBS | BODY MASS INDEX: 37.32 KG/M2 | HEIGHT: 65 IN

## 2021-01-08 DIAGNOSIS — C54.1 MALIGNANT NEOPLASM OF ENDOMETRIUM: ICD-10-CM

## 2021-01-08 PROCEDURE — 99213 OFFICE O/P EST LOW 20 MIN: CPT

## 2021-01-08 PROCEDURE — 99072 ADDL SUPL MATRL&STAF TM PHE: CPT

## 2021-01-12 NOTE — HISTORY OF PRESENT ILLNESS
[FreeTextEntry1] : This 65 year old with grade 2 endometrial adenocarcinoma with over 50% myometrial invasion ([id number]) is s/p Total vaginal hysterectomy, BSO, cystoscopy on 9/8/2020. Patient completed vaginal cuff radiation on 11/5/2020 with Dr. Lizarraga. Patient tolerated treatments well and returns to the office today for a routine surveillance visit.

## 2021-01-12 NOTE — REASON FOR VISIT
[FreeTextEntry1] : [place] Location \par \par [facility] [place]. [phone]\par [address] [place] [address]

## 2021-01-18 ENCOUNTER — RX RENEWAL (OUTPATIENT)
Age: 66
End: 2021-01-18

## 2021-01-28 ENCOUNTER — NON-APPOINTMENT (OUTPATIENT)
Age: 66
End: 2021-01-28

## 2021-02-01 NOTE — H&P PST ADULT - NS PRO LAST MENSTRUAL DATE
Initial / Assessment/Plan of Care Note     Baseline Assessment  77 year old admitted 1/29/2021 as Inpatient with a diagnosis of anemia, ESRD.   Prior to admission patient was living with Adult children and residing at Apartment.  Patient’s Primary Care Provider is Easton Nunez MD.     Medical History  Past Medical History:   Diagnosis Date   • Anemia    • Anxiety and depression    • Blood clot associated with vein wall inflammation    • CAD (coronary artery disease)    • Cerebral infarction (CMS/[facility]ly [facility])    • Chronic kidney disease    • Congestive cardiac failure (CMS/Formerly [facility])    • COPD (chronic obstructive pulmonary disease) (CMS/Formerly [facility])    • Decreased hearing of both ears    • Depression    • Diabetes mellitus (CMS/[facility]ly [facility])    • DVT of lower extremity (deep venous thrombosis) (CMS/Formerly [facility])     RT   • Encephalopathy, hypertensive 11/10/2019   • Essential (primary) hypertension    • Fracture    • GERD (gastroesophageal reflux disease)    • Glaucoma (increased eye pressure)    • High cholesterol    • History of CVA (cerebrovascular accident) 8/17/2020   • History of seizures 8/17/2020   • Internal hemorrhoid    • Osteoarthritis 10/9/2020   • Osteoporosis    • Otitis media    • Pneumonia    • Retinal detachment     RT   • Seizures (CMS/[facility]ly [facility])    • Uncomplicated senile dementia (CMS/Formerly [facility])    • Urinary incontinence    • Urinary tract infection    • Valvular heart disease        Prior to Admission Status  Verified Living arrangments as above.  Prior Level of function - home with family.  Dialysis dependant  Patient  needs assistance with ADL's:   Patient does not drive.  Patient  requires assistance with transportation.      Agency/Support  Type of Services Prior to Hospitalization: Outpatient services  Support Systems: Children  Home Devices/Equipment: None  Mobility Assist Devices: Wheelchair, Gait belt, Standard walker  Sensory Support Devices: None    Current Status  Current Mental Status: Guarded  Stressors:       Insurance  Primary: MEDICARE  Secondary: [facility]    Barriers to Discharge  Identified Barriers to Discharge/Transition Planning:      Progress Note  Call to patient daughter Sonia to assess for needs at discharge.  Introduced and explained role of CM/Discharge planner. Provided patient with CM contact info and received.   Addressed discharge planning white board.  Supplemental information given on planning for discharge.  Verbalized understanding that Care Management team  will continue to follow and reevaluate for discharge needs.   Discussed management of own health prior to hospital admission.  Patient daughter  verbalized a somewhat good understanding of the things family is responsible for  in managing her health.  daughter sonia reports pt lives with adult children in an apt.  She ambulates with walker or wheelcahir.  She goes to [facility] on MWF schedule and son transports her to dialysis.  Pt not on home oxygen. Daughter thinks pt has home therapy however does not know name of agency.  PCP is dr ursula Nunez.  Goal is to return home with [facility]     Potential barriers:  Medical clearance  Medications  Physician follow-up/appointment  Nursing Care  Support  Equipment  O2/Cpap/BiPap      Plan  SW/CM - Recommendations for Discharge:  home with [facility] and dialysis resumption  Therapy-recommendations for Discharge:   PT Recommendation:       OT Recommendation:       Anticipate patient{will need post-hospital services. Necessary services  ARE  available.  Anticipate patient CAN return to the environment from which patient entered the hospital.   Anticipate patient CAN'T provide self-care at discharge.    Refer to SW/CM Flowsheet for Goals and objective data.          not applicable

## 2021-02-12 ENCOUNTER — APPOINTMENT (OUTPATIENT)
Dept: NEPHROLOGY | Facility: CLINIC | Age: 66
End: 2021-02-12

## 2021-02-12 ENCOUNTER — APPOINTMENT (OUTPATIENT)
Dept: FAMILY MEDICINE | Facility: CLINIC | Age: 66
End: 2021-02-12

## 2021-03-19 ENCOUNTER — APPOINTMENT (OUTPATIENT)
Dept: OBGYN | Facility: CLINIC | Age: 66
End: 2021-03-19

## 2021-03-25 ENCOUNTER — RX RENEWAL (OUTPATIENT)
Age: 66
End: 2021-03-25

## 2021-03-26 ENCOUNTER — RX RENEWAL (OUTPATIENT)
Age: 66
End: 2021-03-26

## 2021-04-12 ENCOUNTER — APPOINTMENT (OUTPATIENT)
Dept: GYNECOLOGIC ONCOLOGY | Facility: CLINIC | Age: 66
End: 2021-04-12

## 2021-04-12 NOTE — REASON FOR VISIT
[FreeTextEntry1] : [facility]\par \par [facility] [phone] at [address] [place] [id number]\par

## 2021-04-12 NOTE — HISTORY OF PRESENT ILLNESS
[FreeTextEntry1] : This 65 year old with stage 1B, grade 2 endometrial adenocarcinoma with over 50% myometrial invasion is s/p total vaginal hysterectomy, BSO, cystoscopy on 9/8/2020. Patient completed vaginal cuff radiation on 11/5/2020 with Dr. Lizarraga. Patient tolerated treatments well and returns to the office today for a routine surveillance visit. History of liver transplant for AIH, cirrhosis in ___.   \par \par Health maintenance:\par \par Mammo-reports wnl \par Colonoscopy-8/2018-polyps removed \par DEXA-2018\par \par Preop pet ct-8/2020-FDG avidity of terminal ileum, nonspecific possibly physiologic, additional difficult to delineate focal FDG avidity hepatic flexure suggest colonoscopy. FDG avid 0.7cm rt intraparotid soft tissue nodule and enlarged left thyroid lobe. Pt was evaluated by Dr. Mg and FNA was advised. Her GI was made aware of Pet ct colon findings and on 8/31/20 noted it was not an issue with her upcoming gyn surgery and appears to be a metal clip that was placed by Dr. Bangura. \par \par

## 2021-04-19 ENCOUNTER — RX RENEWAL (OUTPATIENT)
Age: 66
End: 2021-04-19

## 2021-05-13 ENCOUNTER — NON-APPOINTMENT (OUTPATIENT)
Age: 66
End: 2021-05-13

## 2021-05-17 ENCOUNTER — APPOINTMENT (OUTPATIENT)
Dept: GYNECOLOGIC ONCOLOGY | Facility: CLINIC | Age: 66
End: 2021-05-17

## 2021-05-19 ENCOUNTER — NON-APPOINTMENT (OUTPATIENT)
Age: 66
End: 2021-05-19

## 2021-05-19 ENCOUNTER — APPOINTMENT (OUTPATIENT)
Dept: CARDIOLOGY | Facility: CLINIC | Age: 66
End: 2021-05-19
Payer: COMMERCIAL

## 2021-05-19 VITALS
SYSTOLIC BLOOD PRESSURE: 121 MMHG | TEMPERATURE: 97.3 F | OXYGEN SATURATION: 96 % | DIASTOLIC BLOOD PRESSURE: 80 MMHG | HEART RATE: 105 BPM

## 2021-05-19 VITALS — WEIGHT: 223 LBS | BODY MASS INDEX: 37.11 KG/M2

## 2021-05-19 DIAGNOSIS — I50.42 CHRONIC COMBINED SYSTOLIC (CONGESTIVE) AND DIASTOLIC (CONGESTIVE) HEART FAILURE: ICD-10-CM

## 2021-05-19 PROCEDURE — 99215 OFFICE O/P EST HI 40 MIN: CPT

## 2021-05-19 PROCEDURE — 93000 ELECTROCARDIOGRAM COMPLETE: CPT

## 2021-05-19 PROCEDURE — 99072 ADDL SUPL MATRL&STAF TM PHE: CPT

## 2021-05-21 ENCOUNTER — LABORATORY RESULT (OUTPATIENT)
Age: 66
End: 2021-05-21

## 2021-05-21 ENCOUNTER — APPOINTMENT (OUTPATIENT)
Dept: HEPATOLOGY | Facility: CLINIC | Age: 66
End: 2021-05-21
Payer: COMMERCIAL

## 2021-05-21 VITALS
WEIGHT: 223 LBS | OXYGEN SATURATION: 100 % | RESPIRATION RATE: 12 BRPM | DIASTOLIC BLOOD PRESSURE: 94 MMHG | SYSTOLIC BLOOD PRESSURE: 126 MMHG | HEIGHT: 65 IN | HEART RATE: 92 BPM | BODY MASS INDEX: 37.15 KG/M2 | TEMPERATURE: 97 F

## 2021-05-21 DIAGNOSIS — Z71.89 OTHER SPECIFIED COUNSELING: ICD-10-CM

## 2021-05-21 PROCEDURE — 99215 OFFICE O/P EST HI 40 MIN: CPT

## 2021-05-21 PROCEDURE — 99072 ADDL SUPL MATRL&STAF TM PHE: CPT

## 2021-05-21 RX ORDER — FUROSEMIDE 40 MG/1
40 TABLET ORAL TWICE DAILY
Qty: 180 | Refills: 3 | Status: DISCONTINUED | COMMUNITY
Start: 2020-08-24 | End: 2021-05-21

## 2021-05-21 RX ORDER — SACUBITRIL AND VALSARTAN 97; 103 MG/1; MG/1
97-103 TABLET, FILM COATED ORAL
Qty: 180 | Refills: 3 | Status: DISCONTINUED | COMMUNITY
Start: 2020-09-03 | End: 2021-05-21

## 2021-05-21 NOTE — REVIEW OF SYSTEMS
[Lower Ext Edema] : lower extremity edema [SOB on Exertion] : shortness of breath during exertion [Arthralgias] : arthralgias [Negative] : Heme/Lymph [As Noted in HPI] : as noted in HPI [Difficulty Walking] : difficulty walking [Anxiety] : anxiety [Depression] : depression

## 2021-05-21 NOTE — HISTORY OF PRESENT ILLNESS
[de-identified] : Ms. Crisostomo is a 67 yo  F with AIH (diagnosed at age 29) with cirrhosis, s/p DDLT at [facility] (Dr. Smita Lord) in 2004, now with alloimmune hepatitis (with percutaneous liver biopsy on 11/6/18 that showed alloimmune hepatitis with no fibrosis), who is being seen for post-liver transplant follow-up.\par \par Her post-transplant medical history is also notable for: morbid obesity, a history of DVTs (s/p IVC filter placement), chronic Afib (s/p prior cardioversions and ablation, on chronic anticoagulation with Eliquis), combined systolic and diastolic HFrEF, excisions of squamous cell carcinomas of the skin (right arm 2016 and 2018, right neck 2013, and above the right lip 2018), chronic BLE lymphedema, gastric fundic gland polyps with focal high-grade vs low-grade dysplasia (on EGD 2018), left ankle septic arthritis vs pseudogout (hospitalized 6/21/19-7/10/19), right hand/wrist infection (hospitalized 9/4/19-9/13/19), urosepsis (hospitalized 2/14/20-2/18/20), endometrial adenocarcinoma (stage 1B, grade 2, s/p total vaginal hysterectomy/BSO and cystoscopy 9/8/20, s/p vaginal cuff radiation therapy completed in 11/2020), and recently-diagnosed NIDDM2 (diagnosed in 11/2020).\par \par PCP: Dr. Silvia Nash -> Dr. Carmelita Ruiz\par Cardiology: Dr. Favian Mosley\par Cardiology (): Dr. Doron Byrd ([facility])\par GI: Dr. Hoa Bangura\par Gynecology: Dr. Lubna Kennedy\par Gynecology Oncology: Dr. Rogers Paredes\par Nephrology: Dr. Demarcus Mauro\par Endocrinology: Dr. Chivo Nguyen\par \par She was last seen by me on 8/27/20 prior to her hysterectomy and was supposed to have repeat labs in early September and closer follow-up with me as we had recently re-adjusted her immunosuppression due to abnormal liver enzymes, but she was lost to follow-up for the last few months. She says she has been very overwhelmed with her medical care and problems recently, and also because she and her son, daughter-in-law, and their twin 3-year-old grandsons are still trying to find a new home.\par \par She is continuing to work remotely due to the COVID-19 pandemic, and has opted not go get vaccinated so far in part due to concerns about the vaccine's safety and efficacy but also because she is scared that, once she is vaccinated, she will be forced to return to work at the [employer] in person. She does not feel she is well enough to be at work and also is very scared about being exposed to COVID-19 infected individuals there.\par \par Her last available labs through our health system are from 12/2020, but she was also hospitalized at [facility] from 1/16/21-1/21/21 due to severe back spasms, dehydration, and ADAL. She also had severe non-bloody diarrhea x1 month in November 2020 after completing her radiation therapy, that she describes as "like when I used to be on Cellcept", but that subsequently resolved.\par \par She picked up the prescription but never started taking the Tradjenta for her recently-diagnosed DM. She was previously on Entresto for her HF but that has been discontinued. She has been taking 40-60 mg of furosemide almost daily, except if she is going to a medical appointment (as she is concerned because she has had difficulty getting labs drawn because of dehydration and poor veins multiple times).\par \par She has not seen her dermatologist recently and also is overdue for EGD, colonoscopy, and DEXA.\par \par In addition to her listed prescribed medications, she continues to take "Balance of Nature" fruits and vegetables supplements (3 pills twice daily) for a long time.
stable

## 2021-05-21 NOTE — ASSESSMENT
[FreeTextEntry1] : # Post-DDLT with alloimmune hepatitis:\par - Had minimal fibrosis on liver biopsy in 11/2018. Will plan for eventual FibroScan for surveillance (non-urgently).\par - Re-check labs today. Prior labs (last from 12/15/20) with chronic elevations mainly in ALP and GGT, with minimal AST elevations, possibly representing some interval development of chronic allograft rejection versus possible congestive hepatopathy in the context of her CHF. However, she is high risk for increasing immunosuppression given her co-morbidities, including multiple prior infections (leading to prior discontinuation of MMF).\par - Continue tacrolimus, currently 3 mg po q12h; will adjust by trough level with goal of ~4-6 ng/mL.\par - Continue prednisone 5 mg po bid.\par \par # NIDDM2: Newly diagnosed in 11/2020 with HbA1c 7.6%, likely secondary to morbid obesity as well as long-term immunosuppression. Re-check HbA1c with labs today and encouraged follow-up with her PCP and endocrinologist and to start the prescribed medication. Goal HbA1c <7%.\par \par # Ventral hernia with mesh: Palpable mesh in epigastrium from prior hernia repair causes her some discomfort, but too high risk for surgical intervention at this time. Previously discussed with her transplant surgeon Dr. Smita Lord.\par \par # Post-LT health maintenance:\par - BP: Goal <130/80 mmHg. Continuing follow-up with Dr. Mosley re: CHF medications including metoprolol.\par - Lipids: Previously at goal when checked on 11/12/20. Will re-check in 11/2021. Goal LDL <100 mg/dL and goal Tg <250 mg/dL.\par - BMI: Morbidly obese with BMI 37 kg/m2, and will continue to encourage gradual weight loss.\par - Renal function: Previously stable, but with ADAL to Cr 1.5 on labs on 12/15/20. Re-check with labs today.\par - Bone health: DEXA previously ordered but not yet done. Continue vitamin D supplementation and will check vitamin D 25OH with labs today.\par - Dermatology: She was advised to use sun protection measures daily (sunscreen, hat, and long sleeves) and to continue q6 month follow-up with her dermatologist (overdue) for full skin evaluation and given her history of squamous cells cancers.\par - Cancer screening/surveillance: Overdue for repeat EGD for gastric surveillance (due to focal low and high grade dysplasia), was due in 8/2019. Overdue for repeat colonoscopy (due to prior suboptimal bowel prep), was due in 12/2019. She will have these re-scheduled with myself or with her prior GI DrFrantz Bangura, likely in the fall.\par - Vaccinations: Will address at future visit.\par \par # Counseling about COVID-19: We had a lengthy discussion today addressing her concerns regarding COVID-19 vaccinations' safety and efficacy, and I strongly encouraged her to get vaccinated, preferably with either Pfizer or Moderna vaccines. We discussed that while immune response in transplant patients overall is reduced, she may have a good response to vaccination given that it has been many years since her transplant, she is a liver recipient, and is no longer on MMF. We also discussed that if she were to develop symptomatic COVID-19, I would want to offer her monoclonal antibody infusion to decrease likelihood of severe/critical COVID-19.\par \par She will return for follow-up in 3 months.

## 2021-05-21 NOTE — CONSULT LETTER
[Dear  ___] : Dear  [unfilled], [Courtesy Letter:] : I had the pleasure of seeing your patient, [unfilled], in my office today. [Please see my note below.] : Please see my note below. [FreeTextEntry2] : Dr. Carmelita Ruiz [FreeTextEntry3] : \par Kezia Foster M.D., Ph.D.\par Director, Women's Liver Health Program, [facility]\par Transplant Hepatology, [facility]\par  of Medicine\par [employer] [facility] [employer] [facility]\par [facility] [address]\[place] [address]\par Tel: [phone]\par Fax: [phone]\par Cell: [phone]\par E-mail: [email or web address]

## 2021-05-22 ENCOUNTER — NON-APPOINTMENT (OUTPATIENT)
Age: 66
End: 2021-05-22

## 2021-05-22 LAB
25(OH)D3 SERPL-MCNC: 20.5 NG/ML
ALBUMIN SERPL ELPH-MCNC: 3.6 G/DL
ALP BLD-CCNC: 425 U/L
ALT SERPL-CCNC: 43 U/L
ANION GAP SERPL CALC-SCNC: 11 MMOL/L
AST SERPL-CCNC: 47 U/L
BASOPHILS # BLD AUTO: 0.04 K/UL
BASOPHILS NFR BLD AUTO: 0.3 %
BILIRUB DIRECT SERPL-MCNC: 0.4 MG/DL
BILIRUB SERPL-MCNC: 1 MG/DL
BUN SERPL-MCNC: 19 MG/DL
CALCIUM SERPL-MCNC: 9.7 MG/DL
CHLORIDE SERPL-SCNC: 105 MMOL/L
CO2 SERPL-SCNC: 23 MMOL/L
CREAT SERPL-MCNC: 0.75 MG/DL
EOSINOPHIL # BLD AUTO: 0.11 K/UL
EOSINOPHIL NFR BLD AUTO: 0.9 %
ESTIMATED AVERAGE GLUCOSE: 148 MG/DL
FERRITIN SERPL-MCNC: 63 NG/ML
GGT SERPL-CCNC: 337 U/L
GLUCOSE SERPL-MCNC: 155 MG/DL
HBA1C MFR BLD HPLC: 6.8 %
HBV SURFACE AB SERPL IA-ACNC: <3 MIU/ML
HCT VFR BLD CALC: 47.1 %
HEPATITIS A IGG ANTIBODY: REACTIVE
HGB BLD-MCNC: 14.2 G/DL
IMM GRANULOCYTES NFR BLD AUTO: 0.5 %
INR PPP: 1.31 RATIO
IRON SATN MFR SERPL: 24 %
IRON SERPL-MCNC: 72 UG/DL
LYMPHOCYTES # BLD AUTO: 2.14 K/UL
LYMPHOCYTES NFR BLD AUTO: 17 %
MAN DIFF?: NORMAL
MCHC RBC-ENTMCNC: 27.8 PG
MCHC RBC-ENTMCNC: 30.1 GM/DL
MCV RBC AUTO: 92.2 FL
MONOCYTES # BLD AUTO: 0.83 K/UL
MONOCYTES NFR BLD AUTO: 6.6 %
NEUTROPHILS # BLD AUTO: 9.39 K/UL
NEUTROPHILS NFR BLD AUTO: 74.7 %
PLATELET # BLD AUTO: 334 K/UL
POTASSIUM SERPL-SCNC: 4.4 MMOL/L
PROT SERPL-MCNC: 7.1 G/DL
PT BLD: 15.3 SEC
RBC # BLD: 5.11 M/UL
RBC # FLD: 15.4 %
SODIUM SERPL-SCNC: 139 MMOL/L
TACROLIMUS SERPL-MCNC: 9 NG/ML
TIBC SERPL-MCNC: 297 UG/DL
TSH SERPL-ACNC: 0.13 UIU/ML
UIBC SERPL-MCNC: 225 UG/DL
WBC # FLD AUTO: 12.57 K/UL

## 2021-05-31 NOTE — HISTORY OF PRESENT ILLNESS
[FreeTextEntry1] : Patient is a 66 year-old woman known history of acute autoimmune hepatitis at age 30 with cirrhosis, status post liver transplant at [facility] in 2004, known cardiovascular risk factors of morbid obesity, DVT status post IVC filter, atrial fibrillation status post DCCV and catheter ablations, maintained on anticoagulation with apixaban, history of heart failure with reduced ejection fraction, LVEF 46% on TTE in January 2018, maintained on beta-blocker and loop diuretic, but not on ACEi/ARB/ARNI, now presents for cardiovascular evaluation prior to hysterectomy after a biopsy showed endometrial adenocarcinoma.\par \par Patient sleeps with one pillow at night. She does not have paroxysmal nocturnal dyspnea.\par Chronic lower extremity edema vs. lymphedema.\par \par September 2020 - Status post total hysterectomy, BSO, cystoscopy on 9/8/2020. She was discharged from [facility] on 9/9/2020. She transiently increased her steroids but then tapered back down. She has tolerated her Entresto 24-26 mg BID well.\par \par November 2020 - Patient returns today for follow-up. She has been tolerating Entresto 49-51 mg BID, with frequent TID, but some days she decides against the third dose because of symptoms that she associates with her surgery and radiation therapy, including diarrhea.\par \par PMD: Silvia Nash DO and Nayan Lee MD [phone]\par EP: Doron Byrd MD [phone]\par GI: Hoa Bangura MD [phone]\par Hepatologist: Kezia Foster MD [phone]\par Gyn: Lubna Kennedy MD [phone]\par Gyn-Onc: Rogers Paredes MD [phone]

## 2021-05-31 NOTE — REASON FOR VISIT
[Cardiac Failure] : cardiac failure [Follow-Up - Clinic] : a clinic follow-up of [Cardiomyopathy] : cardiomyopathy [FreeTextEntry3] : Kezia Foster MD [FreeTextEntry1] : May 2021 - Patient returns today for follow-up.\par Since her last visit here, she has been hospitalized several times for various symptoms, e.g. diarrhea, back pain, spasms, flank pain, but no etiology is seen to be found.\par Patient lives with her son and daughter-in-law who are in the process of moving. She is dealing with a lot of stress.

## 2021-05-31 NOTE — CARDIOLOGY SUMMARY
[___] : [unfilled] [Moderate] : moderate LV dysfunction [Enlarged] : enlarged LA size [de-identified] : 8/28/2020, AFib with frequent PVCs [de-identified] : 9/1/2020, moderate LV dysfunction, enlarged LA size

## 2021-05-31 NOTE — DISCUSSION/SUMMARY
[FreeTextEntry1] : Patient is a 66 year-old liver transplant recipient with cardiovascular history as above including a history of heart failure with reduced ejection fraction, chronic LE edema, atrial fibrillation on anticoagulation, and DVT (after trauma) status post IVC filter. \par In the fall of 2020, patient was taking metoprolol succinate 50 mg BID and Entresto  mg BID, in addition to furosemide. She continues taking furosemide and metoprolol, but the Entresto was discontinued due to nonspecific complaints prior to hospitalizations for symptoms of unclear etiology, e.g. diarrhea, flank pain, and muscle spasms. \par \par Consider Vascular Cardiology evaluation for retrieval of IVC filter and/or evaluation of anasarca/lymphedema which could be obstructive (from filter, or from endometrial ca).\par \par Will increase metoprolol succinate from 50 mg BID to 50 mg TID.\par Will reassess LVEF by echocardiogram to determine importance of rechallenging with Entresto and possibly SGLT-2 inhibitor.\par \par Strongly encouraged patient to work with a mental health professional about her stress, anxiety, and overall mental well being.

## 2021-06-08 ENCOUNTER — APPOINTMENT (OUTPATIENT)
Dept: CARDIOLOGY | Facility: CLINIC | Age: 66
End: 2021-06-08
Payer: COMMERCIAL

## 2021-06-08 DIAGNOSIS — I89.0 LYMPHEDEMA, NOT ELSEWHERE CLASSIFIED: ICD-10-CM

## 2021-06-08 DIAGNOSIS — Z86.718 PERSONAL HISTORY OF OTHER VENOUS THROMBOSIS AND EMBOLISM: ICD-10-CM

## 2021-06-08 PROCEDURE — 99215 OFFICE O/P EST HI 40 MIN: CPT

## 2021-06-08 PROCEDURE — 99072 ADDL SUPL MATRL&STAF TM PHE: CPT

## 2021-06-09 PROBLEM — I89.0 LYMPHEDEMA OF BOTH LOWER EXTREMITIES: Status: ACTIVE | Noted: 2021-06-08

## 2021-06-09 NOTE — PHYSICAL EXAM
[FreeTextEntry1] : + lipodermatosclerosis.  bilateral pitting edema + stemmers sign.  strong pedal pulses.

## 2021-06-09 NOTE — ASSESSMENT
[FreeTextEntry1] : Assessment:\par 1.  history of DVT\par 2.  Presence of IVC filter (2004, perhaps edwar , placed at [facility]\par 3.  Lymphedema\par 4.  Liver tx\par 5.  Hysterectomy\par \par \par Plan:\par 1.   Patient has  lymphedema and has tried conservative therapies such as compression stockings and leg elevation for over one month, but still remains symptomatic.  There is hyperpigmentation and lipodermatosclerosis on exam.  Will arrange for a pneumatic pump.\par 2.  [facility] lymphedema therapy referral \par 3.  Venous duplex to assess for DVT, and also to assess doppler waveforms at the inflow vessels\par 4.  Start IVC filter evaluation - she will get op report of the filter type and placement at [facility]\par 5.  She will wrap her legs with ACE wraps in AM and take off in PM \par 6.  Followup in 3 months\par \par

## 2021-06-09 NOTE — REASON FOR VISIT
[FreeTextEntry1] : 66F\par Complex medical history \par History of DVT 1997 after falling on ice, treated with lovenox and coumadin\par switched to lovenox during pregnancy 1998\par Had bilateral "thrombus" in the legs and needed vein ligation\par Coumadin again.\par History of autoimmune hepatitis s/p liver tx in 2004 \par Had IVC filter placed (she thinks [place]) at [facility], remains in.\par Follows with Dr. Mosley for cardio.  Started on eliquis 5mg BID for afib 4 years ago\par Hysterectomy in the past\par Here for bilateral edema.  on exam it looks like lymphedema.  + stemmers sign.  remains symptomatic despite compression, she knows how to wrap her legs with ACE wraps\par no recent venous duplex\par no ulceration\par strong pedal pulses on exam.

## 2021-06-10 ENCOUNTER — NON-APPOINTMENT (OUTPATIENT)
Age: 66
End: 2021-06-10

## 2021-06-25 ENCOUNTER — APPOINTMENT (OUTPATIENT)
Dept: CARDIOLOGY | Facility: CLINIC | Age: 66
End: 2021-06-25

## 2021-08-11 ENCOUNTER — APPOINTMENT (OUTPATIENT)
Dept: GASTROENTEROLOGY | Facility: CLINIC | Age: 66
End: 2021-08-11
Payer: COMMERCIAL

## 2021-08-11 VITALS
WEIGHT: 223 LBS | DIASTOLIC BLOOD PRESSURE: 88 MMHG | BODY MASS INDEX: 37.15 KG/M2 | TEMPERATURE: 96.9 F | SYSTOLIC BLOOD PRESSURE: 125 MMHG | OXYGEN SATURATION: 97 % | HEIGHT: 65 IN | HEART RATE: 82 BPM

## 2021-08-11 DIAGNOSIS — Z12.11 ENCOUNTER FOR SCREENING FOR MALIGNANT NEOPLASM OF COLON: ICD-10-CM

## 2021-08-11 DIAGNOSIS — K31.7 POLYP OF STOMACH AND DUODENUM: ICD-10-CM

## 2021-08-11 PROCEDURE — 99215 OFFICE O/P EST HI 40 MIN: CPT

## 2021-08-11 RX ORDER — LINAGLIPTIN 5 MG/1
5 TABLET, FILM COATED ORAL DAILY
Qty: 1 | Refills: 1 | Status: DISCONTINUED | COMMUNITY
Start: 2020-12-11 | End: 2021-08-11

## 2021-08-13 ENCOUNTER — NON-APPOINTMENT (OUTPATIENT)
Age: 66
End: 2021-08-13

## 2021-08-20 ENCOUNTER — OUTPATIENT (OUTPATIENT)
Dept: OUTPATIENT SERVICES | Facility: HOSPITAL | Age: 66
LOS: 1 days | End: 2021-08-20
Payer: COMMERCIAL

## 2021-08-20 VITALS
DIASTOLIC BLOOD PRESSURE: 80 MMHG | HEART RATE: 91 BPM | OXYGEN SATURATION: 98 % | TEMPERATURE: 98 F | SYSTOLIC BLOOD PRESSURE: 130 MMHG | WEIGHT: 222.01 LBS | RESPIRATION RATE: 16 BRPM | HEIGHT: 63 IN

## 2021-08-20 DIAGNOSIS — K63.5 POLYP OF COLON: ICD-10-CM

## 2021-08-20 DIAGNOSIS — Z98.890 OTHER SPECIFIED POSTPROCEDURAL STATES: Chronic | ICD-10-CM

## 2021-08-20 DIAGNOSIS — M11.20 OTHER CHONDROCALCINOSIS, UNSPECIFIED SITE: Chronic | ICD-10-CM

## 2021-08-20 DIAGNOSIS — Z12.11 ENCOUNTER FOR SCREENING FOR MALIGNANT NEOPLASM OF COLON: ICD-10-CM

## 2021-08-20 DIAGNOSIS — I49.9 CARDIAC ARRHYTHMIA, UNSPECIFIED: Chronic | ICD-10-CM

## 2021-08-20 DIAGNOSIS — C54.1 MALIGNANT NEOPLASM OF ENDOMETRIUM: Chronic | ICD-10-CM

## 2021-08-20 DIAGNOSIS — Z98.84 BARIATRIC SURGERY STATUS: Chronic | ICD-10-CM

## 2021-08-20 DIAGNOSIS — R06.02 SHORTNESS OF BREATH: ICD-10-CM

## 2021-08-20 DIAGNOSIS — Z87.798 PERSONAL HISTORY OF OTHER (CORRECTED) CONGENITAL MALFORMATIONS: Chronic | ICD-10-CM

## 2021-08-20 DIAGNOSIS — Z95.828 PRESENCE OF OTHER VASCULAR IMPLANTS AND GRAFTS: Chronic | ICD-10-CM

## 2021-08-20 DIAGNOSIS — Z94.4 LIVER TRANSPLANT STATUS: Chronic | ICD-10-CM

## 2021-08-20 LAB
A1C WITH ESTIMATED AVERAGE GLUCOSE RESULT: 7.2 % — HIGH (ref 4–5.6)
ALBUMIN SERPL ELPH-MCNC: 3.2 G/DL — LOW (ref 3.3–5)
ALP SERPL-CCNC: 319 U/L — HIGH (ref 40–120)
ALT FLD-CCNC: 44 U/L — HIGH (ref 4–33)
ANION GAP SERPL CALC-SCNC: 12 MMOL/L — SIGNIFICANT CHANGE UP (ref 7–14)
AST SERPL-CCNC: 58 U/L — HIGH (ref 4–32)
BILIRUB SERPL-MCNC: 0.8 MG/DL — SIGNIFICANT CHANGE UP (ref 0.2–1.2)
BUN SERPL-MCNC: 10 MG/DL — SIGNIFICANT CHANGE UP (ref 7–23)
CALCIUM SERPL-MCNC: 9.6 MG/DL — SIGNIFICANT CHANGE UP (ref 8.4–10.5)
CHLORIDE SERPL-SCNC: 104 MMOL/L — SIGNIFICANT CHANGE UP (ref 98–107)
CO2 SERPL-SCNC: 21 MMOL/L — LOW (ref 22–31)
CREAT SERPL-MCNC: 0.66 MG/DL — SIGNIFICANT CHANGE UP (ref 0.5–1.3)
ESTIMATED AVERAGE GLUCOSE: 160 — SIGNIFICANT CHANGE UP
GLUCOSE SERPL-MCNC: 119 MG/DL — HIGH (ref 70–99)
HCT VFR BLD CALC: 44.5 % — SIGNIFICANT CHANGE UP (ref 34.5–45)
HGB BLD-MCNC: 14.4 G/DL — SIGNIFICANT CHANGE UP (ref 11.5–15.5)
MCHC RBC-ENTMCNC: 29.2 PG — SIGNIFICANT CHANGE UP (ref 27–34)
MCHC RBC-ENTMCNC: 32.4 GM/DL — SIGNIFICANT CHANGE UP (ref 32–36)
MCV RBC AUTO: 90.3 FL — SIGNIFICANT CHANGE UP (ref 80–100)
NRBC # BLD: 0 /100 WBCS — SIGNIFICANT CHANGE UP
NRBC # FLD: 0 K/UL — SIGNIFICANT CHANGE UP
PLATELET # BLD AUTO: 267 K/UL — SIGNIFICANT CHANGE UP (ref 150–400)
POTASSIUM SERPL-MCNC: 3.9 MMOL/L — SIGNIFICANT CHANGE UP (ref 3.5–5.3)
POTASSIUM SERPL-SCNC: 3.9 MMOL/L — SIGNIFICANT CHANGE UP (ref 3.5–5.3)
PROT SERPL-MCNC: 7.2 G/DL — SIGNIFICANT CHANGE UP (ref 6–8.3)
RBC # BLD: 4.93 M/UL — SIGNIFICANT CHANGE UP (ref 3.8–5.2)
RBC # FLD: 14.9 % — HIGH (ref 10.3–14.5)
SODIUM SERPL-SCNC: 137 MMOL/L — SIGNIFICANT CHANGE UP (ref 135–145)
WBC # BLD: 13.52 K/UL — HIGH (ref 3.8–10.5)
WBC # FLD AUTO: 13.52 K/UL — HIGH (ref 3.8–10.5)

## 2021-08-20 PROCEDURE — 93010 ELECTROCARDIOGRAM REPORT: CPT

## 2021-08-20 RX ORDER — OMEPRAZOLE 10 MG/1
1 CAPSULE, DELAYED RELEASE ORAL
Qty: 0 | Refills: 0 | DISCHARGE

## 2021-08-20 RX ORDER — SACUBITRIL AND VALSARTAN 24; 26 MG/1; MG/1
1 TABLET, FILM COATED ORAL
Qty: 0 | Refills: 0 | DISCHARGE

## 2021-08-20 RX ORDER — CHOLECALCIFEROL (VITAMIN D3) 125 MCG
1 CAPSULE ORAL
Qty: 0 | Refills: 0 | DISCHARGE

## 2021-08-20 RX ORDER — FUROSEMIDE 40 MG
1 TABLET ORAL
Qty: 0 | Refills: 0 | DISCHARGE

## 2021-08-20 RX ORDER — TACROLIMUS 5 MG/1
3 CAPSULE ORAL
Qty: 0 | Refills: 0 | DISCHARGE

## 2021-08-20 RX ORDER — METOPROLOL TARTRATE 50 MG
1 TABLET ORAL
Qty: 0 | Refills: 0 | DISCHARGE

## 2021-08-20 NOTE — H&P PST ADULT - PROBLEM SELECTOR PLAN 2
Await cardiac evaluation with cardiologist due to METS less than 4 with h/o prediabetes, htn, and hypercholesterolemia--notified surgeon via email  * Await stress test from cardiologist  * Instructed to stop Eliquis 3 days before surgery  * Instructed to take normal am dose of    the am of surgery Await cardiac evaluation with cardiologist due to METS less than 4 with h/o prediabetes, htn, and hypercholesterolemia--notified surgeon via email  * Await stress test from cardiologist  * Instructed to stop Eliquis 3 days before surgery  * Instructed to take normal am dose of  omeprazole, prednisone, tacrolimus, and metroprol  the am of surgery Await cardiac evaluation with cardiologist due to METS less than 4 with h/o prediabetes, htn, and hypercholesterolemia--notified surgeon via email  * Await stress test from cardiologist  * Echo in chart  * Instructed to stop Eliquis 3 days before surgery  * Instructed to take normal am dose of  omeprazole, prednisone, tacrolimus, and metroprol  the am of surgery Await cardiac evaluation with cardiologist due to METS less than 4 with h/o prediabetes, htn, and hypercholesterolemia--notified surgeon via email  * Await stress test from cardiologist  * Echo in chart  * Instructed to stop Eliquis 3 days before surgery  * Instructed to take normal am dose of  omeprazole, prednisone, tacrolimus, and metoprolol  the am of surgery

## 2021-08-20 NOTE — H&P PST ADULT - NSICDXPASTSURGICALHX_GEN_ALL_CORE_FT
PAST SURGICAL HISTORY:  Endometrial cancer hysterectomy  September 2020    Iza filter in place     H/O prior ablation treatment 1/2018 and cardioversion    H/O wrist surgery     History of thyroglossal duct cyst removal     Irregular heart beat loop recorder    Pseudogout left shoulder and elbow    S/P liver transplant 2004    S/P shoulder surgery debribdredment FEb 2017

## 2021-08-20 NOTE — H&P PST ADULT - NSICDXPASTMEDICALHX_GEN_ALL_CORE_FT
PAST MEDICAL HISTORY:  Afib     Anemia     Autoimmune hepatitis diagnosed age 29    Colon polyp duodenal and stomach polup    DVT (deep venous thrombosis) LLE    Endometrial cancer September 2020    GERD (gastroesophageal reflux disease)     H/O esophageal varices     History of pseudogout     HLD (hyperlipidemia)     HTN (hypertension)     Liver disease had liver transplant in 2004    Lymphedema of leg ambulates with cane    Migraines     Pain esophageal banding due to esophageal varices    Prediabetes     Scoliosis     Squamous cell skin cancer neck face arms     PAST MEDICAL HISTORY:  Afib     Anemia     Autoimmune hepatitis diagnosed age 29    Colon polyp and duodenal and stomach polyp    DVT (deep venous thrombosis) LLE    Endometrial cancer September 2020    GERD (gastroesophageal reflux disease)     H/O esophageal varices     History of pseudogout     HLD (hyperlipidemia)     HTN (hypertension)     Liver disease had liver transplant in 2004    Lymphedema of leg ambulates with cane    Migraines     Pain esophageal banding due to esophageal varices    Prediabetes     Scoliosis     Squamous cell skin cancer neck face arms

## 2021-08-20 NOTE — H&P PST ADULT - HISTORY OF PRESENT ILLNESS
This is a 65 y/o female who presents with h/o liver transplant due to auto immune  disease in 2004. Has h/o colon polyps. Scheduled for oesophagogastroduodenoscopy and colonoscopy  This is a 65 y/o female who presents with h/o liver transplant due to auto immune  disease in 2004. Has h/o colon polyps. Scheduled for esophagogastroduodenoscopy and colonoscopy

## 2021-08-26 PROBLEM — Z87.19 PERSONAL HISTORY OF OTHER DISEASES OF THE DIGESTIVE SYSTEM: Chronic | Status: ACTIVE | Noted: 2021-08-20

## 2021-08-26 PROBLEM — C54.1 MALIGNANT NEOPLASM OF ENDOMETRIUM: Chronic | Status: ACTIVE | Noted: 2021-08-20

## 2021-08-26 PROBLEM — K76.9 LIVER DISEASE, UNSPECIFIED: Chronic | Status: ACTIVE | Noted: 2018-07-27

## 2021-08-26 PROBLEM — K63.5 POLYP OF COLON: Chronic | Status: ACTIVE | Noted: 2021-08-20

## 2021-08-26 PROBLEM — G43.909 MIGRAINE, UNSPECIFIED, NOT INTRACTABLE, WITHOUT STATUS MIGRAINOSUS: Chronic | Status: ACTIVE | Noted: 2021-08-20

## 2021-08-26 PROBLEM — R52 PAIN, UNSPECIFIED: Chronic | Status: ACTIVE | Noted: 2021-08-20

## 2021-08-27 ENCOUNTER — APPOINTMENT (OUTPATIENT)
Dept: CARDIOLOGY | Facility: CLINIC | Age: 66
End: 2021-08-27
Payer: COMMERCIAL

## 2021-08-27 ENCOUNTER — NON-APPOINTMENT (OUTPATIENT)
Age: 66
End: 2021-08-27

## 2021-08-27 VITALS
SYSTOLIC BLOOD PRESSURE: 166 MMHG | BODY MASS INDEX: 37.44 KG/M2 | WEIGHT: 225 LBS | DIASTOLIC BLOOD PRESSURE: 98 MMHG | HEART RATE: 99 BPM | OXYGEN SATURATION: 96 %

## 2021-08-27 PROCEDURE — 99215 OFFICE O/P EST HI 40 MIN: CPT

## 2021-08-27 PROCEDURE — 93000 ELECTROCARDIOGRAM COMPLETE: CPT

## 2021-08-30 DIAGNOSIS — Z01.818 ENCOUNTER FOR OTHER PREPROCEDURAL EXAMINATION: ICD-10-CM

## 2021-08-31 ENCOUNTER — APPOINTMENT (OUTPATIENT)
Dept: DISASTER EMERGENCY | Facility: CLINIC | Age: 66
End: 2021-08-31

## 2021-09-01 ENCOUNTER — NON-APPOINTMENT (OUTPATIENT)
Age: 66
End: 2021-09-01

## 2021-09-01 LAB — SARS-COV-2 N GENE NPH QL NAA+PROBE: NOT DETECTED

## 2021-09-02 ENCOUNTER — NON-APPOINTMENT (OUTPATIENT)
Age: 66
End: 2021-09-02

## 2021-09-02 NOTE — REASON FOR VISIT
[Cardiac Failure] : cardiac failure [Follow-Up - Clinic] : a clinic follow-up of [Cardiomyopathy] : cardiomyopathy [FreeTextEntry3] : Kezia Foster MD [FreeTextEntry1] : Ms. Crisostomo returns today for preoperative cardiovascular evaluation prior to a planned endoscopy/colonoscopy with Dr. Bangura at [facility] on 9/3/2021. \par Since her last visit she has been feeling well in general, but she does fatigue easily. \par She denies any chest pain or exertional shortness of breath. \par Her medications were reviewed and she is taking all as directed. \par

## 2021-09-02 NOTE — END OF VISIT
[Time Spent: ___ minutes] : I have spent [unfilled] minutes of time on the encounter. [>50% of the face to face encounter time was spent on counseling and/or coordination of care for ___] : Greater than 50% of the face to face encounter time was spent on counseling and/or coordination of care for [unfilled] [FreeTextEntry3] : I agree with the plan as described.\par Repeat TTE.\par Patient may proceed with planned EGD.

## 2021-09-02 NOTE — PHYSICAL EXAM
[General Appearance - In No Acute Distress] : no acute distress [Normal Oral Mucosa] : normal oral mucosa [No Oral Pallor] : no oral pallor [No Oral Cyanosis] : no oral cyanosis [Normal Oropharynx] : normal oropharynx [Normal Jugular Venous V Waves Present] : normal jugular venous V waves present [] : no respiratory distress [Respiration, Rhythm And Depth] : normal respiratory rhythm and effort [Exaggerated Use Of Accessory Muscles For Inspiration] : no accessory muscle use [Auscultation Breath Sounds / Voice Sounds] : lungs were clear to auscultation bilaterally [Bowel Sounds] : normal bowel sounds [Abdomen Soft] : soft [Abdomen Tenderness] : non-tender [Nail Clubbing] : no clubbing of the fingernails [Cyanosis, Localized] : no localized cyanosis [No Xanthoma] : no  xanthoma was observed [Skin Color & Pigmentation] : normal skin color and pigmentation [Oriented To Time, Place, And Person] : oriented to person, place, and time [Impaired Insight] : insight and judgment were intact [Affect] : the affect was normal [Mood] : the mood was normal [No Anxiety] : not feeling anxious [Conjunctiva] : the conjunctiva were normal in both eyes [Normal] : the eyelids were normal bilaterally [PERRL] : pupils were equal in size, round, and reactive to light [EOM Intact] : extraocular movements were intact [Not Palpable] : not palpable [Tachycardia] : tachycardic [Irregularly Irregular] : irregularly irregular [Normal S1] : normal S1 [Normal S2] : normal S2 [No Murmur] : no murmurs heard [Anasarca] : anasarca edema present [FreeTextEntry1] : ambulates with cane for assist [Yellow Sclera (Icteric)] : no scleral icterus was seen

## 2021-09-02 NOTE — DISCUSSION/SUMMARY
[FreeTextEntry1] : Patient is a 66 year-old liver transplant recipient with cardiovascular history as above including a history of heart failure with reduced ejection fraction, chronic LE edema, atrial fibrillation on anticoagulation, and DVT (after trauma) status post IVC filter. \par In the fall of 2020, patient was taking metoprolol succinate 50 mg BID and Entresto  mg BID, in addition to furosemide. She continues taking furosemide and metoprolol, but the Entresto was discontinued due to nonspecific complaints prior to hospitalizations for symptoms of unclear etiology, e.g. diarrhea, flank pain, and muscle spasms. \par \par No changes to her medications were suggested today.\par \par Will reassess LVEF by echocardiogram to determine importance of rechallenging with Entresto and possibly SGLT-2 inhibitor later this year.\par \par She has been strongly encouraged patient to work with a mental health professional about her stress, anxiety, and overall mental well being.\par \par She may stop her Eliquis 72 hours prior to the endoscopy/colonoscopy and will resume it once the risk of bleeding has passed. From a cardiovascular standpoint she is optimized and may proceed with the planned procedure without any further cardiac testing or reservation. \par \par

## 2021-09-02 NOTE — CARDIOLOGY SUMMARY
[___] : [unfilled] [Moderate] : moderate LV dysfunction [Enlarged] : enlarged LA size [de-identified] : 8/28/2020, AFib with frequent PVCs\par 8/27/2021. AFib. Unchanged from prior.  [de-identified] : 9/1/2020, moderate LV dysfunction, enlarged LA size

## 2021-09-02 NOTE — HISTORY OF PRESENT ILLNESS
[FreeTextEntry1] : Patient is a 66 year-old woman known history of acute autoimmune hepatitis at age 30 with cirrhosis, status post liver transplant at [facility] in 2004, known cardiovascular risk factors of morbid obesity, DVT status post IVC filter, atrial fibrillation status post DCCV and catheter ablations, maintained on anticoagulation with apixaban, history of heart failure with reduced ejection fraction, LVEF 46% on TTE in January 2018, maintained on beta-blocker and loop diuretic, but not on ACEi/ARB/ARNI, now presents for cardiovascular evaluation prior to hysterectomy after a biopsy showed endometrial adenocarcinoma.\par \par Patient sleeps with one pillow at night. She does not have paroxysmal nocturnal dyspnea.\par Chronic lower extremity edema vs. lymphedema.\par \par September 2020 - Status post total hysterectomy, BSO, cystoscopy on 9/8/2020. She was discharged from [facility] on 9/9/2020. She transiently increased her steroids but then tapered back down. She has tolerated her Entresto 24-26 mg BID well.\par \par November 2020 - Patient returns today for follow-up. She has been tolerating Entresto 49-51 mg BID, with frequent TID, but some days she decides against the third dose because of symptoms that she associates with her surgery and radiation therapy, including diarrhea.\par \par May 2021 - Patient returns today for follow-up.\par Since her last visit here, she has been hospitalized several times for various symptoms, e.g. diarrhea, back pain, spasms, flank pain, but no etiology is seen to be found.\par Patient lives with her son and daughter-in-law who are in the process of moving. She is dealing with a lot of stress. \par \par PMD: Silvia Nash DO and Nayan Lee MD [phone]\par EP: Doron Byrd MD [phone]\par GI: Hoa Bangura MD [phone]\par Hepatologist: Kezia Foster MD [phone]\par Gyn: Lubna Kennedy MD [phone]\par Gyn-Onc: Rogers Paredes MD [phone]

## 2021-09-03 ENCOUNTER — APPOINTMENT (OUTPATIENT)
Dept: GASTROENTEROLOGY | Facility: HOSPITAL | Age: 66
End: 2021-09-03

## 2021-09-03 ENCOUNTER — OUTPATIENT (OUTPATIENT)
Dept: OUTPATIENT SERVICES | Facility: HOSPITAL | Age: 66
LOS: 1 days | Discharge: HOME | End: 2021-09-03
Payer: COMMERCIAL

## 2021-09-03 ENCOUNTER — NON-APPOINTMENT (OUTPATIENT)
Age: 66
End: 2021-09-03

## 2021-09-03 ENCOUNTER — RESULT REVIEW (OUTPATIENT)
Age: 66
End: 2021-09-03

## 2021-09-03 VITALS
RESPIRATION RATE: 25 BRPM | DIASTOLIC BLOOD PRESSURE: 71 MMHG | HEART RATE: 103 BPM | SYSTOLIC BLOOD PRESSURE: 122 MMHG | OXYGEN SATURATION: 94 %

## 2021-09-03 VITALS
RESPIRATION RATE: 19 BRPM | DIASTOLIC BLOOD PRESSURE: 63 MMHG | OXYGEN SATURATION: 97 % | HEART RATE: 109 BPM | TEMPERATURE: 99 F | SYSTOLIC BLOOD PRESSURE: 131 MMHG

## 2021-09-03 DIAGNOSIS — Z87.798 PERSONAL HISTORY OF OTHER (CORRECTED) CONGENITAL MALFORMATIONS: Chronic | ICD-10-CM

## 2021-09-03 DIAGNOSIS — Z12.11 ENCOUNTER FOR SCREENING FOR MALIGNANT NEOPLASM OF COLON: ICD-10-CM

## 2021-09-03 DIAGNOSIS — M11.20 OTHER CHONDROCALCINOSIS, UNSPECIFIED SITE: Chronic | ICD-10-CM

## 2021-09-03 DIAGNOSIS — I49.9 CARDIAC ARRHYTHMIA, UNSPECIFIED: Chronic | ICD-10-CM

## 2021-09-03 DIAGNOSIS — Z95.828 PRESENCE OF OTHER VASCULAR IMPLANTS AND GRAFTS: Chronic | ICD-10-CM

## 2021-09-03 DIAGNOSIS — Z98.890 OTHER SPECIFIED POSTPROCEDURAL STATES: Chronic | ICD-10-CM

## 2021-09-03 DIAGNOSIS — C54.1 MALIGNANT NEOPLASM OF ENDOMETRIUM: Chronic | ICD-10-CM

## 2021-09-03 DIAGNOSIS — Z94.4 LIVER TRANSPLANT STATUS: Chronic | ICD-10-CM

## 2021-09-03 PROCEDURE — 45380 COLONOSCOPY AND BIOPSY: CPT | Mod: GC

## 2021-09-03 PROCEDURE — 88305 TISSUE EXAM BY PATHOLOGIST: CPT | Mod: 26

## 2021-09-03 PROCEDURE — 43251 EGD REMOVE LESION SNARE: CPT | Mod: GC

## 2021-09-08 LAB — SURGICAL PATHOLOGY STUDY: SIGNIFICANT CHANGE UP

## 2021-09-15 ENCOUNTER — NON-APPOINTMENT (OUTPATIENT)
Age: 66
End: 2021-09-15

## 2021-09-28 ENCOUNTER — APPOINTMENT (OUTPATIENT)
Dept: HEPATOLOGY | Facility: CLINIC | Age: 66
End: 2021-09-28

## 2021-10-27 ENCOUNTER — APPOINTMENT (OUTPATIENT)
Dept: CARDIOLOGY | Facility: CLINIC | Age: 66
End: 2021-10-27

## 2021-12-09 ENCOUNTER — NON-APPOINTMENT (OUTPATIENT)
Age: 66
End: 2021-12-09

## 2021-12-13 ENCOUNTER — NON-APPOINTMENT (OUTPATIENT)
Age: 66
End: 2021-12-13

## 2021-12-13 LAB
ALBUMIN SERPL ELPH-MCNC: 2.9 G/DL
ALP BLD-CCNC: 274 U/L
ALT SERPL-CCNC: 37 U/L
ANION GAP SERPL CALC-SCNC: 12 MMOL/L
AST SERPL-CCNC: 46 U/L
BILIRUB DIRECT SERPL-MCNC: 0.2 MG/DL
BILIRUB SERPL-MCNC: 0.5 MG/DL
BUN SERPL-MCNC: 23 MG/DL
CALCIUM SERPL-MCNC: 8.9 MG/DL
CHLORIDE SERPL-SCNC: 106 MMOL/L
CO2 SERPL-SCNC: 21 MMOL/L
CREAT SERPL-MCNC: 0.98 MG/DL
GGT SERPL-CCNC: 315 U/L
GLUCOSE SERPL-MCNC: 131 MG/DL
INR PPP: 1.3 RATIO
POTASSIUM SERPL-SCNC: 3.8 MMOL/L
PROT SERPL-MCNC: 6.4 G/DL
PT BLD: 15.2 SEC
SODIUM SERPL-SCNC: 138 MMOL/L
TACROLIMUS SERPL-MCNC: 13.3 NG/ML

## 2021-12-14 LAB — IGG SER QL IEP: 1748 MG/DL

## 2021-12-15 LAB
BASOPHILS # BLD AUTO: 0.03 K/UL
BASOPHILS NFR BLD AUTO: 0.3 %
EOSINOPHIL # BLD AUTO: 0.13 K/UL
EOSINOPHIL NFR BLD AUTO: 1.3 %
HCT VFR BLD CALC: 46.3 %
HGB BLD-MCNC: 14.7 G/DL
IMM GRANULOCYTES NFR BLD AUTO: 0.3 %
LYMPHOCYTES # BLD AUTO: 3.06 K/UL
LYMPHOCYTES NFR BLD AUTO: 30.3 %
MAN DIFF?: NORMAL
MCHC RBC-ENTMCNC: 29.1 PG
MCHC RBC-ENTMCNC: 31.7 GM/DL
MCV RBC AUTO: 91.7 FL
MONOCYTES # BLD AUTO: 0.69 K/UL
MONOCYTES NFR BLD AUTO: 6.8 %
NEUTROPHILS # BLD AUTO: 6.15 K/UL
NEUTROPHILS NFR BLD AUTO: 61 %
PLATELET # BLD AUTO: 217 K/UL
RBC # BLD: 5.05 M/UL
RBC # FLD: 14.6 %
WBC # FLD AUTO: 10.09 K/UL

## 2021-12-16 LAB — CMV DNA SPEC QL NAA+PROBE: NOT DETECTED IU/ML

## 2021-12-27 ENCOUNTER — NON-APPOINTMENT (OUTPATIENT)
Age: 66
End: 2021-12-27

## 2021-12-27 LAB
ALBUMIN SERPL ELPH-MCNC: 3.1 G/DL
ALP BLD-CCNC: 355 U/L
ALT SERPL-CCNC: 62 U/L
ANION GAP SERPL CALC-SCNC: 11 MMOL/L
AST SERPL-CCNC: 80 U/L
BASOPHILS # BLD AUTO: 0.04 K/UL
BASOPHILS NFR BLD AUTO: 0.3 %
BILIRUB SERPL-MCNC: 0.7 MG/DL
BUN SERPL-MCNC: 14 MG/DL
CALCIUM SERPL-MCNC: 9 MG/DL
CHLORIDE SERPL-SCNC: 100 MMOL/L
CO2 SERPL-SCNC: 26 MMOL/L
CREAT SERPL-MCNC: 0.67 MG/DL
EOSINOPHIL # BLD AUTO: 0.23 K/UL
EOSINOPHIL NFR BLD AUTO: 1.7 %
GGT SERPL-CCNC: 377 U/L
GLUCOSE SERPL-MCNC: 59 MG/DL
HCT VFR BLD CALC: 43.7 %
HGB BLD-MCNC: 13.2 G/DL
IMM GRANULOCYTES NFR BLD AUTO: 0.3 %
LYMPHOCYTES # BLD AUTO: 2.89 K/UL
LYMPHOCYTES NFR BLD AUTO: 21.3 %
MAN DIFF?: NORMAL
MCHC RBC-ENTMCNC: 29.7 PG
MCHC RBC-ENTMCNC: 30.2 GM/DL
MCV RBC AUTO: 98.4 FL
MONOCYTES # BLD AUTO: 0.93 K/UL
MONOCYTES NFR BLD AUTO: 6.9 %
NEUTROPHILS # BLD AUTO: 9.43 K/UL
NEUTROPHILS NFR BLD AUTO: 69.5 %
PLATELET # BLD AUTO: 266 K/UL
POTASSIUM SERPL-SCNC: 5.3 MMOL/L
PROT SERPL-MCNC: 6.2 G/DL
RBC # BLD: 4.44 M/UL
RBC # FLD: 14.9 %
SODIUM SERPL-SCNC: 138 MMOL/L
TACROLIMUS SERPL-MCNC: 4.4 NG/ML
WBC # FLD AUTO: 13.56 K/UL

## 2022-01-04 ENCOUNTER — NON-APPOINTMENT (OUTPATIENT)
Age: 67
End: 2022-01-04

## 2022-01-10 LAB
ALBUMIN SERPL ELPH-MCNC: 3.3 G/DL
ALP BLD-CCNC: 462 U/L
ALT SERPL-CCNC: 83 U/L
ANION GAP SERPL CALC-SCNC: 14 MMOL/L
AST SERPL-CCNC: 82 U/L
BASOPHILS # BLD AUTO: 0.04 K/UL
BASOPHILS NFR BLD AUTO: 0.3 %
BILIRUB SERPL-MCNC: 1.4 MG/DL
BUN SERPL-MCNC: 11 MG/DL
CALCIUM SERPL-MCNC: 9 MG/DL
CHLORIDE SERPL-SCNC: 107 MMOL/L
CO2 SERPL-SCNC: 18 MMOL/L
CREAT SERPL-MCNC: 0.66 MG/DL
EOSINOPHIL # BLD AUTO: 0.18 K/UL
EOSINOPHIL NFR BLD AUTO: 1.3 %
GLUCOSE SERPL-MCNC: 84 MG/DL
HCT VFR BLD CALC: 43.3 %
HGB BLD-MCNC: 13.7 G/DL
IMM GRANULOCYTES NFR BLD AUTO: 0.4 %
INR PPP: 1.3 RATIO
LYMPHOCYTES # BLD AUTO: 2.59 K/UL
LYMPHOCYTES NFR BLD AUTO: 19.2 %
MAN DIFF?: NORMAL
MCHC RBC-ENTMCNC: 29.7 PG
MCHC RBC-ENTMCNC: 31.6 GM/DL
MCV RBC AUTO: 93.9 FL
MONOCYTES # BLD AUTO: 0.88 K/UL
MONOCYTES NFR BLD AUTO: 6.5 %
NEUTROPHILS # BLD AUTO: 9.76 K/UL
NEUTROPHILS NFR BLD AUTO: 72.3 %
PLATELET # BLD AUTO: 298 K/UL
POTASSIUM SERPL-SCNC: 5.4 MMOL/L
PROT SERPL-MCNC: 6.8 G/DL
PT BLD: 15.2 SEC
RBC # BLD: 4.61 M/UL
RBC # FLD: 15.9 %
SARS-COV-2 N GENE NPH QL NAA+PROBE: NOT DETECTED
SODIUM SERPL-SCNC: 138 MMOL/L
TACROLIMUS SERPL-MCNC: 6.7 NG/ML
WBC # FLD AUTO: 13.51 K/UL

## 2022-01-11 ENCOUNTER — NON-APPOINTMENT (OUTPATIENT)
Age: 67
End: 2022-01-11

## 2022-01-12 ENCOUNTER — NON-APPOINTMENT (OUTPATIENT)
Age: 67
End: 2022-01-12

## 2022-01-18 ENCOUNTER — LABORATORY RESULT (OUTPATIENT)
Age: 67
End: 2022-01-18

## 2022-01-19 ENCOUNTER — LABORATORY RESULT (OUTPATIENT)
Age: 67
End: 2022-01-19

## 2022-01-19 LAB
CMV IGG SERPL QL: >10 U/ML
CMV IGG SERPL-IMP: POSITIVE
CMV IGM SERPL QL: 10.4 AU/ML
CMV IGM SERPL QL: NEGATIVE
GI PCR PANEL, STOOL: NORMAL

## 2022-01-20 ENCOUNTER — APPOINTMENT (OUTPATIENT)
Dept: HEPATOLOGY | Facility: CLINIC | Age: 67
End: 2022-01-20

## 2022-01-20 ENCOUNTER — APPOINTMENT (OUTPATIENT)
Dept: HEPATOLOGY | Facility: CLINIC | Age: 67
End: 2022-01-20
Payer: COMMERCIAL

## 2022-01-20 DIAGNOSIS — K52.9 NONINFECTIVE GASTROENTERITIS AND COLITIS, UNSPECIFIED: ICD-10-CM

## 2022-01-20 DIAGNOSIS — U09.9 POST COVID-19 CONDITION, UNSPECIFIED: ICD-10-CM

## 2022-01-20 PROCEDURE — 99443: CPT | Mod: 95

## 2022-01-20 RX ORDER — RIFAXIMIN 550 MG/1
550 TABLET ORAL 3 TIMES DAILY
Qty: 42 | Refills: 0 | Status: COMPLETED | COMMUNITY
Start: 2022-01-20 | End: 2022-02-03

## 2022-01-20 RX ORDER — POLYETHYLENE GLYCOL 3350 AND ELECTROLYTES WITH LEMON FLAVOR 236; 22.74; 6.74; 5.86; 2.97 G/4L; G/4L; G/4L; G/4L; G/4L
236 POWDER, FOR SOLUTION ORAL
Qty: 1 | Refills: 0 | Status: COMPLETED | COMMUNITY
Start: 2021-08-11 | End: 2021-09-03

## 2022-01-21 ENCOUNTER — LABORATORY RESULT (OUTPATIENT)
Age: 67
End: 2022-01-21

## 2022-01-23 NOTE — REVIEW OF SYSTEMS
[Lower Ext Edema] : lower extremity edema [SOB on Exertion] : shortness of breath during exertion [Arthralgias] : arthralgias [Difficulty Walking] : difficulty walking [Anxiety] : anxiety [Depression] : depression [Negative] : Heme/Lymph [Feeling Poorly] : feeling poorly [Feeling Tired] : feeling tired [Recent Weight Loss (___ Lbs)] : recent [unfilled] ~Ulb weight loss [Nasal Discharge] : nasal discharge [Hoarseness] : hoarseness [Diarrhea] : diarrhea [As Noted in HPI] : as noted in HPI [Sleep Disturbances] : sleep disturbances

## 2022-01-23 NOTE — HISTORY OF PRESENT ILLNESS
[Home] : at home, [unfilled] , at the time of the visit. [Medical Office: ([facility])___] : at the medical office located in  [Verbal consent obtained from patient] : the patient, [unfilled] [de-identified] : Ms. Crisostomo is a 65 yo  F with AIH (diagnosed at age 29) with cirrhosis, s/p DDLT (at [facility] with Dr. Smita Lord in 2004), now with alloimmune hepatitis (with percutaneous liver biopsy on 11/6/18 that showed alloimmune hepatitis with no fibrosis).\par \par Her post-transplant medical history is also notable for: morbid obesity, a history of DVTs (s/p IVC filter placement), chronic Afib (s/p prior cardioversions and ablation, on chronic anticoagulation with Eliquis), combined systolic and diastolic HFrEF, excisions of squamous cell carcinomas of the skin (right arm 2016 and 2018, right neck 2013, and above the right lip 2018), chronic BLE lymphedema, gastric fundic gland polyps with focal high-grade vs low-grade dysplasia (on EGD 2018), left ankle septic arthritis vs pseudogout (hospitalized 6/21/19-7/10/19), right hand/wrist infection (hospitalized 9/4/19-9/13/19), urosepsis (hospitalized 2/14/20-2/18/20), endometrial adenocarcinoma (stage 1B, grade 2, s/p total vaginal hysterectomy/BSO and cystoscopy 9/8/20, s/p vaginal cuff radiation therapy completed in 11/2020), and recently-diagnosed NIDDM2 (diagnosed in 11/2020).\par \par PCP: Dr. Silvia Nash -> Dr. Carmelita Ruiz\par Cardiology: Dr. Favian Mosley\par Cardiology (): Dr. Doron Byrd ([place])\par GI: Dr. Hoa Bangura\par Gynecology: Dr. Lubna Kennedy\par Gynecology Oncology: Dr. Rogers Paredes\par Nephrology: Dr. Demarcus Mauro\par Endocrinology: Dr. Chivo Nguyen\par \par She was last seen by me on 5/21/21 and is having a telehealth follow-up today per her request, after she recently cancelled an in-person visit. She was unable to connect to video via [employer] and therefore a telephonic visit was conducted today instead.\par \par She tested positive for COVID-19 on 12/7/21 but did not receive monoclonal antibody infusion as her symptoms had already begun >10 days prior to that and appeared to be improving. She did not require hospitalization or supplemental oxygen as her respiratory symptoms were mild, however, she has struggled with non-respiratory symptoms, some of which still persist. She complains of persistent "overwhelming exhaustion", with "feeling out of touch" and "difficulty focusing", though without prashant confusion. She still has occasional nausea but without vomiting and her appetite is poor, though she is no longer losing weight unintentionally as she had when she first became sick. She has not regained the weight yet. She complains of persistent loose but non-bloody stools with urgency, though the stool color has become more normal. She has had 6 BMs so far today. Over the past few days, she also thinks she may have caught another URI as she feels cold, has non-drenching sweating some nights, has nasal congestion and postnasal drip, had a hoarse voice yesterday, and feels mildly "achy, like the flu." Because of her ongoing diarrhea, she has not been taking furosemide in several weeks but has not had any noticeable worsening lower extremity swelling. She is taking her metoprolol twice daily, not three times daily. \par \par In addition to her listed prescribed medications, she continues to take "Balance of Nature" fruits and vegetables supplements (3 pills twice daily) for a long time.\par \par She is currently not working due to her symptoms ever since her COVID-19 infection. Prior to becoming ill, she was working at the [employer]. She lives with her son, daughter-in-law, and their twin 3-year-olds.

## 2022-01-23 NOTE — ASSESSMENT
[FreeTextEntry1] : # Post-DDLT with alloimmune hepatitis:\par - Had minimal fibrosis on liver biopsy in 11/2018. Will plan for eventual FibroScan for surveillance (non-urgently).\par - Prior labs with chronic elevations in ALP and GGT with minimal AST elevations, possibly representing some interval development of chronic allograft rejection vs possible congestive hepatopathy in the context of her CHF.\par - Most recent labs (from 1/6/22) with increased AST and ALT compared to her baseline after tacrolimus dose was decreased from 3 mg po q12h to 2 mg po q12h (12/14/21) and not yet improved after increase to 2.5 mg po q12h (12/27/21).\par - Re-check labs (ordered today). Pending lab results, may need to increase tacrolimus again.\par - Current immunosuppression: tacrolimus 2.5 mg po q12h and prednisone 5 mg po bid.\par - She may need higher tacrolimus trough level goal due to alloimmune hepatitis, but also has had multiple infections, leading to prior discontinuation of MMF.\par \par # Concern for long COVID, with persistent fatigue, difficulty concentrating, malaise, and diarrhea:\par - Stool C diff pending, will follow-up results.\par - CMV PCR negative (12/13/21). Stool GI PCR negative (1/18/22).\par - Start trial of rifaximin 550 mg po tid for post-infectious diarrhea / possible SIBO.\par - Advised to follow-up with her PCP.\par \par # NIDDM2: Newly diagnosed in 11/2020 but not yet on medication. Most recent HbA1c 7.2% (8/2021). Advised to follow-up with her PCP. Goal HbA1c <7%.\par \par # Ventral hernia with mesh: Palpable mesh in epigastrium from prior hernia repair causes her some discomfort, but too high risk for surgical intervention at this time. Previously discussed with her transplant surgeon Dr. Smita Lord.\par \par # Post-LT health maintenance:\par - BP: Goal <130/80 mmHg. Continuing follow-up with Dr. Mosley re: CHF medications including metoprolol.\par - Lipids: Previously at goal when checked on 11/12/20. Needs re-check. Goal LDL <100 mg/dL and goal Tg <250 mg/dL.\par - BMI: Morbidly obese with BMI 37 kg/m2, and will continue to encourage gradual healthy weight loss.\par - Renal function: Stable, with Cr 0.66 on most recent labs (1/6/22).\par - Bone health: DEXA previously ordered but not yet done. Will re-address at next visit. Continue vitamin D supplementation.\par - Dermatology: She was advised to use sun protection measures daily (sunscreen, hat, and long sleeves) and to continue q6 month follow-up with her dermatologist (overdue) for full skin evaluation and given her history of squamous cells cancers.\par - Cancer screening/surveillance: S/p repeat EGD and colonoscopy on 9/3/21 with her GI Dr. Bangura. Next due for repeat EGD/colonoscopy in 3 years (2024) per Dr. Bangura. Advised to follow-up with gynecologist oncologist Dr. Sun re: endometrial cancer surveillance.\par - Vaccinations: Will refer to Transplant ID for vaccinations.\par \par Next follow-up: 1 month

## 2022-01-24 LAB
C DIFF TOXIN B QL PCR REFLEX: NORMAL
GDH ANTIGEN: DETECTED
TOXIN A AND B: NOT DETECTED

## 2022-01-25 ENCOUNTER — NON-APPOINTMENT (OUTPATIENT)
Age: 67
End: 2022-01-25

## 2022-01-25 DIAGNOSIS — A04.72 ENTEROCOLITIS DUE TO CLOSTRIDIUM DIFFICILE, NOT SPECIFIED AS RECURRENT: ICD-10-CM

## 2022-01-25 LAB
25(OH)D3 SERPL-MCNC: 19 NG/ML
ALBUMIN SERPL ELPH-MCNC: 3.3 G/DL
ALP BLD-CCNC: 406 U/L
ALT SERPL-CCNC: 60 U/L
ANION GAP SERPL CALC-SCNC: 13 MMOL/L
AST SERPL-CCNC: 72 U/L
BASOPHILS # BLD AUTO: 0.03 K/UL
BASOPHILS NFR BLD AUTO: 0.2 %
BILIRUB SERPL-MCNC: 0.9 MG/DL
BUN SERPL-MCNC: 19 MG/DL
CALCIUM SERPL-MCNC: 8.8 MG/DL
CHLORIDE SERPL-SCNC: 102 MMOL/L
CMV DNA SPEC QL NAA+PROBE: NOT DETECTED IU/ML
CO2 SERPL-SCNC: 22 MMOL/L
CREAT SERPL-MCNC: 0.89 MG/DL
EBV DNA SERPL NAA+PROBE-ACNC: NOT DETECTED IU/ML
EOSINOPHIL # BLD AUTO: 0.18 K/UL
EOSINOPHIL NFR BLD AUTO: 1.5 %
FERRITIN SERPL-MCNC: 122 NG/ML
GGT SERPL-CCNC: 402 U/L
GLUCOSE SERPL-MCNC: 108 MG/DL
HCT VFR BLD CALC: 43.3 %
HGB BLD-MCNC: 13.2 G/DL
IMM GRANULOCYTES NFR BLD AUTO: 0.3 %
IRON SATN MFR SERPL: 30 %
IRON SERPL-MCNC: 69 UG/DL
LYMPHOCYTES # BLD AUTO: 3.45 K/UL
LYMPHOCYTES NFR BLD AUTO: 28.3 %
MAGNESIUM SERPL-MCNC: 1.6 MG/DL
MAN DIFF?: NORMAL
MCHC RBC-ENTMCNC: 29.6 PG
MCHC RBC-ENTMCNC: 30.5 GM/DL
MCV RBC AUTO: 97.1 FL
MONOCYTES # BLD AUTO: 0.93 K/UL
MONOCYTES NFR BLD AUTO: 7.6 %
NEUTROPHILS # BLD AUTO: 7.57 K/UL
NEUTROPHILS NFR BLD AUTO: 62.1 %
PHOSPHATE SERPL-MCNC: 2.6 MG/DL
PLATELET # BLD AUTO: 289 K/UL
POTASSIUM SERPL-SCNC: 4.3 MMOL/L
PROT SERPL-MCNC: 6.5 G/DL
RAPID RVP RESULT: NOT DETECTED
RBC # BLD: 4.46 M/UL
RBC # FLD: 16.1 %
SARS-COV-2 RNA PNL RESP NAA+PROBE: NOT DETECTED
SODIUM SERPL-SCNC: 138 MMOL/L
TACROLIMUS SERPL-MCNC: 3.9 NG/ML
TIBC SERPL-MCNC: 230 UG/DL
TSH SERPL-ACNC: 0.28 UIU/ML
UIBC SERPL-MCNC: 161 UG/DL
VIT B12 SERPL-MCNC: 707 PG/ML
WBC # FLD AUTO: 12.2 K/UL

## 2022-01-26 LAB
C DIFF TOXIN B QL PCR REFLEX: NORMAL
GDH ANTIGEN: DETECTED
TOXIN A AND B: NOT DETECTED
ZINC SERPL-MCNC: 53 UG/DL

## 2022-01-27 ENCOUNTER — APPOINTMENT (OUTPATIENT)
Dept: INFECTIOUS DISEASE | Facility: CLINIC | Age: 67
End: 2022-01-27
Payer: COMMERCIAL

## 2022-01-27 PROCEDURE — 99202 OFFICE O/P NEW SF 15 MIN: CPT | Mod: 95

## 2022-01-29 NOTE — HISTORY OF PRESENT ILLNESS
[FreeTextEntry1] : 67 yo woman with AIH diagnosed at age 30yo, s/p DDLT at [facility] in 2004 now with alloimmune hepatitis with no fibrosis, obesity, history of DVTs s/p IVC filter, chronic A.fib (s/p cardioversion and ablation on chronic anticoagulation), combined systolic and diastolic HFref, excisions for squamous cell carcinomas of the skin 2016 and 2018, chronic bilateral lower extremity lymphedema, pseudogout, right hand/wrist infection in 9/19, UTI with sepsis in 2/20, endometrial adenocarcinoma stage 1B with vaginal hysterectomy/BSO and cystoscopy in 9/20 and subsequent vaginal cuff radiation, diagnosed with NIDDM2 in 11/20\par \par She developed COVID infection along with other family members  She developed symptoms despite two doses of COVID vaccination 12/7/20. Her diarrhea has persisited and she was tested for C.diff with GDH positive but toxin was negative and PCR  is pending. She has started rifaximin oral  medication on 1/26/22  She plans to start oral Vancomycin 125mg qid today 1/27/22\par Will treat for 14days and see how she is feeling and if taper is needed\par \par Other studies show CMV viral load negative

## 2022-01-29 NOTE — ASSESSMENT
[FreeTextEntry1] : 65 yo woman with a history s/p OLTx and development of COVID in early December, but she was over ten days of symptoms by the time testing was done. She had a prolonged illness but no SOB or respiratory complaints and reports more flu like sxs.  Since development of the COVID infection she has noted profuse watery diarrhea, with difficulty leaving the house because of urgency.  She is also still noting feeling very tired.\par Reports stool at times is orange colored.\par She plans to start oral Vancomycin 125mg qid tonight\par will complete 14 days and reassess\par GI-pcr x2 were negative\par \par She is in need of a COVID booster 3rd dose and will obtain when GI sxs improve\par She is also in need of flu vaccine\par \par She believes she is UTD on other vaccinations\par \par Will schedule a follow up visit to coincide with her next trip to Hepatology office

## 2022-01-31 LAB
A-TOCOPHEROL VIT E SERPL-MCNC: 7.1 MG/L
BETA+GAMMA TOCOPHEROL SERPL-MCNC: 0.5 MG/L
VIT A SERPL-MCNC: 17.3 UG/DL

## 2022-02-01 DIAGNOSIS — E50.9 VITAMIN A DEFICIENCY, UNSPECIFIED: ICD-10-CM

## 2022-02-10 ENCOUNTER — RX RENEWAL (OUTPATIENT)
Age: 67
End: 2022-02-10

## 2022-02-11 LAB — MENADIONE SERPL-MCNC: 0.15 NG/ML

## 2022-02-23 ENCOUNTER — RX RENEWAL (OUTPATIENT)
Age: 67
End: 2022-02-23

## 2022-02-24 ENCOUNTER — RX RENEWAL (OUTPATIENT)
Age: 67
End: 2022-02-24

## 2022-03-04 ENCOUNTER — RX RENEWAL (OUTPATIENT)
Age: 67
End: 2022-03-04

## 2022-04-22 ENCOUNTER — RX RENEWAL (OUTPATIENT)
Age: 67
End: 2022-04-22

## 2022-04-24 ENCOUNTER — RX RENEWAL (OUTPATIENT)
Age: 67
End: 2022-04-24

## 2022-05-04 ENCOUNTER — APPOINTMENT (OUTPATIENT)
Dept: HEPATOLOGY | Facility: CLINIC | Age: 67
End: 2022-05-04

## 2022-05-18 ENCOUNTER — RX RENEWAL (OUTPATIENT)
Age: 67
End: 2022-05-18

## 2022-06-03 ENCOUNTER — APPOINTMENT (OUTPATIENT)
Dept: HEPATOLOGY | Facility: CLINIC | Age: 67
End: 2022-06-03

## 2022-06-30 ENCOUNTER — NON-APPOINTMENT (OUTPATIENT)
Age: 67
End: 2022-06-30

## 2022-07-15 ENCOUNTER — NON-APPOINTMENT (OUTPATIENT)
Age: 67
End: 2022-07-15

## 2022-09-12 ENCOUNTER — APPOINTMENT (OUTPATIENT)
Dept: FAMILY MEDICINE | Facility: CLINIC | Age: 67
End: 2022-09-12

## 2022-09-12 VITALS
TEMPERATURE: 98 F | HEIGHT: 65 IN | SYSTOLIC BLOOD PRESSURE: 134 MMHG | DIASTOLIC BLOOD PRESSURE: 72 MMHG | OXYGEN SATURATION: 96 % | HEART RATE: 75 BPM

## 2022-09-12 DIAGNOSIS — Z12.31 ENCOUNTER FOR SCREENING MAMMOGRAM FOR MALIGNANT NEOPLASM OF BREAST: ICD-10-CM

## 2022-09-12 DIAGNOSIS — Z23 ENCOUNTER FOR IMMUNIZATION: ICD-10-CM

## 2022-09-12 DIAGNOSIS — M85.80 OTHER SPECIFIED DISORDERS OF BONE DENSITY AND STRUCTURE, UNSPECIFIED SITE: ICD-10-CM

## 2022-09-12 PROCEDURE — 36415 COLL VENOUS BLD VENIPUNCTURE: CPT

## 2022-09-12 PROCEDURE — 90732 PPSV23 VACC 2 YRS+ SUBQ/IM: CPT

## 2022-09-12 PROCEDURE — G0009: CPT

## 2022-09-12 PROCEDURE — 99215 OFFICE O/P EST HI 40 MIN: CPT | Mod: 25

## 2022-09-12 RX ORDER — FUROSEMIDE 20 MG/1
20 TABLET ORAL
Qty: 60 | Refills: 1 | Status: ACTIVE | COMMUNITY
Start: 2020-12-18

## 2022-09-12 RX ORDER — VANCOMYCIN HYDROCHLORIDE 125 MG/1
125 CAPSULE ORAL 4 TIMES DAILY
Qty: 40 | Refills: 0 | Status: DISCONTINUED | COMMUNITY
Start: 2022-01-25 | End: 2022-09-12

## 2022-09-12 NOTE — HISTORY OF PRESENT ILLNESS
[FreeTextEntry1] : pt is here for a follow up [de-identified] : admitto April 19 found to have toxic gout in left elbow and wrist and then found to have it in left knee also - had to go for emergency I and D and discharged to surg rehab on may 3 - on may 13 sent to [facility] for UTI infection and returned to rehab . on may 18 had covid and ended up with kidney failure was in [facility] till june 28 to july 4. went back to surg rehab and was discharged september 1 \par no chest pain, no sob, no cough, no fever, no dizziness, no abdominal pain, no n/v/d/c/melena/brbpr/hematuria/dysuria\par admits to chronic left wrist pain but elbow and knee better .

## 2022-09-12 NOTE — PHYSICAL EXAM
[No Acute Distress] : no acute distress [Well Nourished] : well nourished [Well Developed] : well developed [Well-Appearing] : well-appearing [Normal Voice/Communication] : normal voice/communication [Normal Sclera/Conjunctiva] : normal sclera/conjunctiva [Normal Outer Ear/Nose] : the outer ears and nose were normal in appearance [No JVD] : no jugular venous distention [No Respiratory Distress] : no respiratory distress  [No Accessory Muscle Use] : no accessory muscle use [Clear to Auscultation] : lungs were clear to auscultation bilaterally [Normal Rate] : normal rate  [Regular Rhythm] : with a regular rhythm [Normal S1, S2] : normal S1 and S2 [No Murmur] : no murmur heard [Soft] : abdomen soft [Non Tender] : non-tender [Non-distended] : non-distended [No Masses] : no abdominal mass palpated [No HSM] : no HSM [Normal Bowel Sounds] : normal bowel sounds [No CVA Tenderness] : no CVA  tenderness [No Spinal Tenderness] : no spinal tenderness [No Joint Swelling] : no joint swelling [Grossly Normal Strength/Tone] : grossly normal strength/tone [No Rash] : no rash [Coordination Grossly Intact] : coordination grossly intact [No Focal Deficits] : no focal deficits [Normal Gait] : normal gait [Speech Grossly Normal] : speech grossly normal [Normal Affect] : the affect was normal [Alert and Oriented x3] : oriented to person, place, and time [Normal Mood] : the mood was normal [Normal Insight/Judgement] : insight and judgment were intact [de-identified] : bilateral LE edema  [de-identified] : left elbow normal flexion , nontender knee- no erythema, left wrist mild pain to palpation - decreased ROM

## 2022-09-12 NOTE — ASSESSMENT
[FreeTextEntry1] : followup labs \par continue PT and ot recommended - states will inquire home care as she needs help with showering due to arm and extermity\par schedule cpe \par get mammo and dexa \par time spent 40 min

## 2022-09-16 LAB
25(OH)D3 SERPL-MCNC: 18.4 NG/ML
ALBUMIN SERPL ELPH-MCNC: 3.6 G/DL
ALP BLD-CCNC: 375 U/L
ALT SERPL-CCNC: 61 U/L
ANION GAP SERPL CALC-SCNC: 11 MMOL/L
AST SERPL-CCNC: 88 U/L
BASOPHILS # BLD AUTO: 0.05 K/UL
BASOPHILS NFR BLD AUTO: 0.5 %
BILIRUB SERPL-MCNC: 0.9 MG/DL
BUN SERPL-MCNC: 21 MG/DL
CALCIUM SERPL-MCNC: 9.7 MG/DL
CHLORIDE SERPL-SCNC: 106 MMOL/L
CHOLEST SERPL-MCNC: 154 MG/DL
CO2 SERPL-SCNC: 24 MMOL/L
COVID-19 NUCLEOCAPSID  GAM ANTIBODY INTERPRETATION: POSITIVE
COVID-19 SPIKE DOMAIN ANTIBODY INTERPRETATION: POSITIVE
CREAT SERPL-MCNC: 0.65 MG/DL
EGFR: 98 ML/MIN/1.73M2
EOSINOPHIL # BLD AUTO: 0.22 K/UL
EOSINOPHIL NFR BLD AUTO: 2.3 %
ESTIMATED AVERAGE GLUCOSE: 134 MG/DL
GLUCOSE SERPL-MCNC: 241 MG/DL
HBA1C MFR BLD HPLC: 6.3 %
HCT VFR BLD CALC: 40.7 %
HDLC SERPL-MCNC: 66 MG/DL
HGB BLD-MCNC: 12.3 G/DL
IMM GRANULOCYTES NFR BLD AUTO: 0.3 %
LDLC SERPL CALC-MCNC: 72 MG/DL
LYMPHOCYTES # BLD AUTO: 1.6 K/UL
LYMPHOCYTES NFR BLD AUTO: 17 %
MAN DIFF?: NORMAL
MCHC RBC-ENTMCNC: 29.7 PG
MCHC RBC-ENTMCNC: 30.2 GM/DL
MCV RBC AUTO: 98.3 FL
MONOCYTES # BLD AUTO: 0.71 K/UL
MONOCYTES NFR BLD AUTO: 7.5 %
NEUTROPHILS # BLD AUTO: 6.81 K/UL
NEUTROPHILS NFR BLD AUTO: 72.4 %
NONHDLC SERPL-MCNC: 88 MG/DL
PLATELET # BLD AUTO: 302 K/UL
POTASSIUM SERPL-SCNC: 4.6 MMOL/L
PROT SERPL-MCNC: 6.2 G/DL
RBC # BLD: 4.14 M/UL
RBC # FLD: 16 %
SARS-COV-2 AB SERPL IA-ACNC: >250 U/ML
SARS-COV-2 AB SERPL QL IA: 37.7 INDEX
SODIUM SERPL-SCNC: 141 MMOL/L
TRIGL SERPL-MCNC: 80 MG/DL
TSH SERPL-ACNC: 0.48 UIU/ML
URATE SERPL-MCNC: 8.5 MG/DL
WBC # FLD AUTO: 9.42 K/UL

## 2022-09-21 NOTE — PATIENT PROFILE ADULT - FOOD INSECURITY
Advised patient to contact pharmacy for refills as may have multiple specialties. Then he can discuss with them all refills needed and they can send to ordering provider to review. Patient has upcoming appointment 10/4/22. Jinny Rice R.N.     no

## 2022-10-25 ENCOUNTER — RX RENEWAL (OUTPATIENT)
Age: 67
End: 2022-10-25

## 2022-11-21 NOTE — ASU PATIENT PROFILE, ADULT - NS TRANSFER EYEGLASSES PAIRS
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    Medication verified, no changes.  PCP and Pharmacy verified.    Social History     Tobacco Use   Smoking Status Never Smoker   Smokeless Tobacco Never Used     Advance Directives Filed: No    ECOG:   ECOG [11/21/22 1237]   ECOG Performance Status 0       Vitals:    Visit Vitals  Resp 16   Wt 95 kg (209 lb 7 oz)   BMI 38.31 kg/m²       These vital signs are:  Within defined parameters (Per Reference \"Defined Limits Hospital Outpatient Department (HOD)\")    Height: No.  Ht Readings from Last 1 Encounters:   08/30/22 5' 2\" (1.575 m)     Weight:Yes, shoes off.  Wt Readings from Last 3 Encounters:   11/21/22 95 kg (209 lb 7 oz)   08/30/22 95.4 kg (210 lb 4.8 oz)   08/01/22 96.2 kg (212 lb)       BMI: Body mass index is 38.31 kg/m².    REVIEW OF SYSTEMS  GENERAL:  Patient denies headache, fevers, chills, night sweats, excessive fatigue, change in appetite, weight loss, dizziness  ALLERGIC/IMMUNOLOGIC: Verified allergies: Yes  EYES:  Patient denies significant visual difficulties, double vision, blurred vision  ENT/MOUTH: Patient denies problems with hearing, sore throat, sinus drainage, mouth sores, but complains of: discomfort with lymphnodes in neck  ENDOCRINE:  Patient denies diabetes, thyroid disease, hormone replacement, hot flashes  HEMATOLOGIC/LYMPHATIC: Patient denies easy bruising, bleeding, tender lymph nodes, swollen lymph nodes  BREASTS: Patient denies abnormal masses of breast, nipple discharge, pain, but complains of: R side breast pain x 1 yr  RESPIRATORY:  Patient denies lung pain with breathing, cough, coughing up blood, shortness of breath  CARDIOVASCULAR:  Patient denies anginal chest pain, palpitations, shortness of breath when lying flat, peripheral edema  GASTROINTESTINAL: Patient  denies abdominal pain , nausea, vomiting, diarrhea, GI bleeding, constipation, change in bowel habits, heartburn, sensation of feeling full, difficulty swallowing  : Patient denies abnormal genital masses, blood in the urine, frequency, urgency, burning with urination, hesitancy, incontinence, vaginal bleeding, discharge  MUSCULOSKELETAL:  Patient denies joint pain, bone pain, joint swelling, redness, decreased range of motion  SKIN:  Patient denies chronic rashes, inflammation, ulcerations, skin changes, itching  NEUROLOGIC:  Patient denies loss of balance, areas of focal weakness, abnormal gait, sensory problems, numbness, tingling  PSYCHIATRIC: Patient denies insomnia, depression, anxiety    This patient reported abnormal symptoms that needed immediate verbal communication: No     1 pair

## 2022-12-19 NOTE — ASU PATIENT PROFILE, ADULT - HEALTH/HEALTHCARE ANXIETIES, PROFILE
Detail Level: Detailed
Add 30420 Cpt? (Important Note: In 2017 The Use Of 58444 Is Being Tracked By Cms To Determine Future Global Period Reimbursement For Global Periods): no
none

## 2022-12-20 DIAGNOSIS — M25.562 PAIN IN LEFT KNEE: ICD-10-CM

## 2022-12-20 DIAGNOSIS — M25.532 PAIN IN LEFT WRIST: ICD-10-CM

## 2022-12-26 ENCOUNTER — RX RENEWAL (OUTPATIENT)
Age: 67
End: 2022-12-26

## 2022-12-27 ENCOUNTER — RX RENEWAL (OUTPATIENT)
Age: 67
End: 2022-12-27

## 2022-12-27 ENCOUNTER — NON-APPOINTMENT (OUTPATIENT)
Age: 67
End: 2022-12-27

## 2022-12-27 RX ORDER — MULTIVITAMIN,THER AND MINERALS
TABLET ORAL
Refills: 0 | Status: ACTIVE | COMMUNITY

## 2022-12-27 RX ORDER — CHOLECALCIFEROL (VITAMIN D3) 1250 MCG
1.25 MG CAPSULE ORAL
Qty: 6 | Refills: 1 | Status: DISCONTINUED | COMMUNITY
End: 2022-12-27

## 2022-12-27 RX ORDER — CHOLECALCIFEROL (VITAMIN D3) 50 MCG
50 MCG TABLET ORAL
Refills: 0 | Status: ACTIVE | COMMUNITY
Start: 2022-12-27

## 2022-12-27 RX ORDER — ERGOCALCIFEROL 1.25 MG/1
1.25 MG CAPSULE ORAL
Qty: 12 | Refills: 0 | Status: DISCONTINUED | COMMUNITY
Start: 2022-01-25 | End: 2022-12-27

## 2023-01-11 ENCOUNTER — RX RENEWAL (OUTPATIENT)
Age: 68
End: 2023-01-11

## 2023-02-21 ENCOUNTER — NON-APPOINTMENT (OUTPATIENT)
Age: 68
End: 2023-02-21

## 2023-03-16 NOTE — H&P PST ADULT - NS PRO FEM REPRO PAP RESULTS
Post-Op Assessment Note    CV Status:  Stable  Pain Score: 0    Pain management: adequate     Mental Status:  Alert and awake   Hydration Status:  Euvolemic and stable   PONV Controlled:  Controlled   Airway Patency:  Patent and adequate      Post Op Vitals Reviewed: Yes      Staff: CRNA         No notable events documented      BP      Temp      Pulse     Resp      SpO2
abnormal

## 2023-03-27 ENCOUNTER — RX RENEWAL (OUTPATIENT)
Age: 68
End: 2023-03-27

## 2023-03-29 ENCOUNTER — APPOINTMENT (OUTPATIENT)
Dept: HEPATOLOGY | Facility: CLINIC | Age: 68
End: 2023-03-29

## 2023-03-31 ENCOUNTER — RX RENEWAL (OUTPATIENT)
Age: 68
End: 2023-03-31

## 2023-04-17 ENCOUNTER — APPOINTMENT (OUTPATIENT)
Dept: CARDIOLOGY | Facility: CLINIC | Age: 68
End: 2023-04-17
Payer: MEDICARE

## 2023-04-17 ENCOUNTER — NON-APPOINTMENT (OUTPATIENT)
Age: 68
End: 2023-04-17

## 2023-04-17 VITALS
BODY MASS INDEX: 32.78 KG/M2 | DIASTOLIC BLOOD PRESSURE: 80 MMHG | HEART RATE: 100 BPM | OXYGEN SATURATION: 97 % | SYSTOLIC BLOOD PRESSURE: 132 MMHG | WEIGHT: 197 LBS

## 2023-04-17 DIAGNOSIS — I50.20 UNSPECIFIED SYSTOLIC (CONGESTIVE) HEART FAILURE: ICD-10-CM

## 2023-04-17 PROCEDURE — 99215 OFFICE O/P EST HI 40 MIN: CPT

## 2023-04-17 PROCEDURE — 93000 ELECTROCARDIOGRAM COMPLETE: CPT

## 2023-04-17 NOTE — HISTORY OF PRESENT ILLNESS
[FreeTextEntry1] : Patient is a 66 year-old woman known history of acute autoimmune hepatitis at age 30 with cirrhosis, status post liver transplant at [facility] in 2004, known cardiovascular risk factors of morbid obesity, DVT status post IVC filter, atrial fibrillation status post DCCV and catheter ablations, maintained on anticoagulation with apixaban, history of heart failure with reduced ejection fraction, LVEF 46% on TTE in January 2018, maintained on beta-blocker and loop diuretic, but not on ACEi/ARB/ARNI, now presents for cardiovascular evaluation prior to hysterectomy after a biopsy showed endometrial adenocarcinoma.\par \par Patient sleeps with one pillow at night. She does not have paroxysmal nocturnal dyspnea.\par Chronic lower extremity edema vs. lymphedema.\par \par September 2020 - Status post total hysterectomy, BSO, cystoscopy on 9/8/2020. She was discharged from [facility] on 9/9/2020. She transiently increased her steroids but then tapered back down. She has tolerated her Entresto 24-26 mg BID well.\par \par November 2020 - Patient returns today for follow-up. She has been tolerating Entresto 49-51 mg BID, with frequent TID, but some days she decides against the third dose because of symptoms that she associates with her surgery and radiation therapy, including diarrhea.\par \par May 2021 - Patient returns today for follow-up.\par Since her last visit here, she has been hospitalized several times for various symptoms, e.g. diarrhea, back pain, spasms, flank pain, but no etiology is seen to be found.\par Patient lives with her son and daughter-in-law who are in the process of moving. She is dealing with a lot of stress. \par \par August 2021 - Ms. Crisostomo returns today for preoperative cardiovascular evaluation prior to a planned endoscopy/colonoscopy with Dr. Bangura at [facility] on 9/3/2021. \par Since her last visit she has been feeling well in general, but she does fatigue easily. \par She denies any chest pain or exertional shortness of breath. \par Her medications were reviewed and she is taking all as directed. \par \par PMD: Silvia Nash DO and Nayan Lee MD [phone]\par EP: Doron Byrd MD [phone]\par GI: Hoa Bangura MD [phone]\par Hepatologist: Kezia Foster MD [phone]\par Gyn: Lubna Kennedy MD [phone]\par Gyn-Onc: Rogers Paredes MD [phone]

## 2023-04-17 NOTE — REASON FOR VISIT
[Symptom and Test Evaluation] : symptom and test evaluation [Cardiac Failure] : cardiac failure [Arrhythmia/ECG Abnorrmalities] : arrhythmia/ECG abnormalities [FreeTextEntry3] : Kezia Foster MD PhD [FreeTextEntry1] : April 2023 - Patient returns today for follow-up more than a year since her last visit. She remains on Eliqiuis 5 mg BID for anticoagulation. She remains on metoprolol succinate 50 mg BID for rate control. \par Since her last visit here, she has had Covid-19 several times, which she believes is affecting her memory. She was also hospitalized several times for gout and associated infections. \par She has lost more than 20 lbs by emphasizing a plant based diet and maintaining her diuretic regimen.\par She is scheduled for cataract surgeries with Jaden Michel MD, on 5/25/2023 and 6/8/2023.

## 2023-04-17 NOTE — PHYSICAL EXAM
[General Appearance - In No Acute Distress] : no acute distress [Normal Oral Mucosa] : normal oral mucosa [No Oral Pallor] : no oral pallor [No Oral Cyanosis] : no oral cyanosis [Normal Oropharynx] : normal oropharynx [Normal Jugular Venous V Waves Present] : normal jugular venous V waves present [] : no respiratory distress [Exaggerated Use Of Accessory Muscles For Inspiration] : no accessory muscle use [Respiration, Rhythm And Depth] : normal respiratory rhythm and effort [Auscultation Breath Sounds / Voice Sounds] : lungs were clear to auscultation bilaterally [Bowel Sounds] : normal bowel sounds [Abdomen Soft] : soft [Abdomen Tenderness] : non-tender [Nail Clubbing] : no clubbing of the fingernails [Cyanosis, Localized] : no localized cyanosis [Skin Color & Pigmentation] : normal skin color and pigmentation [No Xanthoma] : no  xanthoma was observed [Impaired Insight] : insight and judgment were intact [Oriented To Time, Place, And Person] : oriented to person, place, and time [Affect] : the affect was normal [Mood] : the mood was normal [No Anxiety] : not feeling anxious [Conjunctiva] : the conjunctiva were normal in both eyes [Normal] : the eyelids were normal bilaterally [PERRL] : pupils were equal in size, round, and reactive to light [EOM Intact] : extraocular movements were intact [Not Palpable] : not palpable [Tachycardia] : tachycardic [Irregularly Irregular] : irregularly irregular [Normal S1] : normal S1 [Normal S2] : normal S2 [No Murmur] : no murmurs heard [Anasarca] : anasarca edema present [FreeTextEntry1] : ambulates with cane for assist [Yellow Sclera (Icteric)] : no scleral icterus was seen

## 2023-04-17 NOTE — DISCUSSION/SUMMARY
[EKG obtained to assist in diagnosis and management of assessed problem(s)] : EKG obtained to assist in diagnosis and management of assessed problem(s) [FreeTextEntry1] : Patient is a 67 year-old liver transplant recipient with cardiovascular history as above including a history of heart failure with reduced ejection fraction, chronic LE edema, atrial fibrillation on anticoagulation, and DVT (after trauma) status post IVC filter. \par In the fall of 2020, patient was taking metoprolol succinate 50 mg BID and Entresto  mg BID, in addition to furosemide. She continues taking furosemide and metoprolol, but the Entresto was discontinued due to nonspecific complaints prior to hospitalizations for symptoms of unclear etiology, e.g. diarrhea, flank pain, and muscle spasms. \par \par No changes to her medications were suggested today.\par \par Will reassess LVEF by echocardiogram.\par \par She has been strongly encouraged patient to work with a mental health professional about her stress, anxiety, and overall mental well being.\par \par She is without acute coronary syndrome, decompensated heart failure, dangerous arrhythmia, or critical valvular stenosis.\par No further cardiovascular testing is necessary prior to proceeding with scheduled cataract surgery.\par She may stop her Eliquis 72 hours prior to the cataract surgery, although the risks of bleeding are minimal.

## 2023-05-01 ENCOUNTER — RX RENEWAL (OUTPATIENT)
Age: 68
End: 2023-05-01

## 2023-05-05 ENCOUNTER — APPOINTMENT (OUTPATIENT)
Dept: CARDIOLOGY | Facility: CLINIC | Age: 68
End: 2023-05-05
Payer: MEDICARE

## 2023-05-05 PROCEDURE — 93306 TTE W/DOPPLER COMPLETE: CPT

## 2023-05-11 ENCOUNTER — LABORATORY RESULT (OUTPATIENT)
Age: 68
End: 2023-05-11

## 2023-05-11 ENCOUNTER — APPOINTMENT (OUTPATIENT)
Dept: FAMILY MEDICINE | Facility: CLINIC | Age: 68
End: 2023-05-11
Payer: MEDICARE

## 2023-05-11 VITALS
TEMPERATURE: 98.2 F | WEIGHT: 197 LBS | DIASTOLIC BLOOD PRESSURE: 83 MMHG | BODY MASS INDEX: 32.78 KG/M2 | HEART RATE: 119 BPM | SYSTOLIC BLOOD PRESSURE: 120 MMHG | OXYGEN SATURATION: 97 %

## 2023-05-11 VITALS — HEART RATE: 86 BPM

## 2023-05-11 DIAGNOSIS — R79.89 OTHER SPECIFIED ABNORMAL FINDINGS OF BLOOD CHEMISTRY: ICD-10-CM

## 2023-05-11 DIAGNOSIS — R73.09 OTHER ABNORMAL GLUCOSE: ICD-10-CM

## 2023-05-11 PROCEDURE — 99213 OFFICE O/P EST LOW 20 MIN: CPT | Mod: 25

## 2023-05-11 PROCEDURE — 36415 COLL VENOUS BLD VENIPUNCTURE: CPT

## 2023-05-12 ENCOUNTER — APPOINTMENT (OUTPATIENT)
Dept: HEPATOLOGY | Facility: CLINIC | Age: 68
End: 2023-05-12

## 2023-05-12 ENCOUNTER — TRANSCRIPTION ENCOUNTER (OUTPATIENT)
Age: 68
End: 2023-05-12

## 2023-05-12 ENCOUNTER — APPOINTMENT (OUTPATIENT)
Dept: CARDIOLOGY | Facility: CLINIC | Age: 68
End: 2023-05-12
Payer: MEDICARE

## 2023-05-12 LAB
25(OH)D3 SERPL-MCNC: 24.5 NG/ML
ALBUMIN SERPL ELPH-MCNC: 3.4 G/DL
ALP BLD-CCNC: 379 U/L
ALT SERPL-CCNC: 50 U/L
ANION GAP SERPL CALC-SCNC: 11 MMOL/L
APPEARANCE: ABNORMAL
AST SERPL-CCNC: 69 U/L
BACTERIA: ABNORMAL /HPF
BASOPHILS # BLD AUTO: 0.05 K/UL
BASOPHILS NFR BLD AUTO: 0.4 %
BILIRUB SERPL-MCNC: 1.3 MG/DL
BILIRUBIN URINE: NEGATIVE
BLOOD URINE: NEGATIVE
BUN SERPL-MCNC: 19 MG/DL
CALCIUM OXALATE CRYSTALS: PRESENT
CALCIUM SERPL-MCNC: 9.7 MG/DL
CAST: 0 /LPF
CHLORIDE SERPL-SCNC: 106 MMOL/L
CHOLEST SERPL-MCNC: 180 MG/DL
CO2 SERPL-SCNC: 22 MMOL/L
COLOR: YELLOW
CREAT SERPL-MCNC: 0.75 MG/DL
CREAT SPEC-SCNC: 71 MG/DL
CREAT/PROT UR: 0.3 RATIO
EGFR: 88 ML/MIN/1.73M2
EOSINOPHIL # BLD AUTO: 0.19 K/UL
EOSINOPHIL NFR BLD AUTO: 1.6 %
EPITHELIAL CELLS: 10 /HPF
ESTIMATED AVERAGE GLUCOSE: 154 MG/DL
GLUCOSE QUALITATIVE U: NEGATIVE MG/DL
GLUCOSE SERPL-MCNC: 192 MG/DL
HBA1C MFR BLD HPLC: 7 %
HCT VFR BLD CALC: 43.6 %
HDLC SERPL-MCNC: 84 MG/DL
HGB BLD-MCNC: 13.5 G/DL
IMM GRANULOCYTES NFR BLD AUTO: 0.5 %
KETONES URINE: NEGATIVE MG/DL
LDLC SERPL CALC-MCNC: 83 MG/DL
LEUKOCYTE ESTERASE URINE: ABNORMAL
LYMPHOCYTES # BLD AUTO: 1.73 K/UL
LYMPHOCYTES NFR BLD AUTO: 14.2 %
MAN DIFF?: NORMAL
MCHC RBC-ENTMCNC: 29.3 PG
MCHC RBC-ENTMCNC: 31 GM/DL
MCV RBC AUTO: 94.6 FL
MICROSCOPIC-UA: NORMAL
MONOCYTES # BLD AUTO: 0.73 K/UL
MONOCYTES NFR BLD AUTO: 6 %
NEUTROPHILS # BLD AUTO: 9.46 K/UL
NEUTROPHILS NFR BLD AUTO: 77.3 %
NITRITE URINE: NEGATIVE
NONHDLC SERPL-MCNC: 96 MG/DL
PH URINE: 6
PLATELET # BLD AUTO: 280 K/UL
POTASSIUM SERPL-SCNC: 4.6 MMOL/L
PROT SERPL-MCNC: 6.9 G/DL
PROT UR-MCNC: 22 MG/DL
PROTEIN URINE: NORMAL MG/DL
RBC # BLD: 4.61 M/UL
RBC # FLD: 17.1 %
RED BLOOD CELLS URINE: 20 /HPF
REVIEW: NORMAL
SODIUM SERPL-SCNC: 140 MMOL/L
SPECIFIC GRAVITY URINE: 1.01
TRIGL SERPL-MCNC: 67 MG/DL
TSH SERPL-ACNC: 0.16 UIU/ML
URATE SERPL-MCNC: 7.5 MG/DL
UROBILINOGEN URINE: 0.2 MG/DL
WBC # FLD AUTO: 12.22 K/UL
WHITE BLOOD CELLS URINE: 7 /HPF

## 2023-05-12 PROCEDURE — 99214 OFFICE O/P EST MOD 30 MIN: CPT | Mod: 95

## 2023-05-12 NOTE — REASON FOR VISIT
[Symptom and Test Evaluation] : symptom and test evaluation [Cardiac Failure] : cardiac failure [Arrhythmia/ECG Abnorrmalities] : arrhythmia/ECG abnormalities [FreeTextEntry3] : Kezia Foster MD PhD [FreeTextEntry1] : May 2023 - \par TeleHealth Video Encounter\par Initiated by: Patient election for TeleHealth visit\par Patient was consented for TeleHealth visit\par Patient Location: Home\par Physician Location: Office ([address] [place] [address], [address], [place], [place], [address])\par Duration of Encounter: 30 minutes, at least 50% of which was spent in direct counseling and coordination of care.\par  \par She is scheduled for cataract surgeries with Jaden Michel MD, on 5/25/2023 and 6/8/2023. There have been no changes in her health since she was here in April. Echocardiogram performed 5/5/2023 was unchanged from 2020 study, showing mildly reduced LVEF with regional wall motion abnormalities. She has also been seen and evaluated by her primary care physician. She will see Dr. Foster on 5/19/2023.

## 2023-05-12 NOTE — PHYSICAL EXAM
[General Appearance - In No Acute Distress] : no acute distress [Normal Oral Mucosa] : normal oral mucosa [No Oral Pallor] : no oral pallor [No Oral Cyanosis] : no oral cyanosis [Normal Oropharynx] : normal oropharynx [Normal Jugular Venous V Waves Present] : normal jugular venous V waves present [] : no respiratory distress [Respiration, Rhythm And Depth] : normal respiratory rhythm and effort [Exaggerated Use Of Accessory Muscles For Inspiration] : no accessory muscle use [Auscultation Breath Sounds / Voice Sounds] : lungs were clear to auscultation bilaterally [Bowel Sounds] : normal bowel sounds [Abdomen Soft] : soft [Abdomen Tenderness] : non-tender [FreeTextEntry1] : ambulates with cane for assist [Nail Clubbing] : no clubbing of the fingernails [Cyanosis, Localized] : no localized cyanosis [Skin Color & Pigmentation] : normal skin color and pigmentation [No Xanthoma] : no  xanthoma was observed [Oriented To Time, Place, And Person] : oriented to person, place, and time [Impaired Insight] : insight and judgment were intact [Affect] : the affect was normal [Mood] : the mood was normal [No Anxiety] : not feeling anxious [Conjunctiva] : the conjunctiva were normal in both eyes [Normal] : the eyelids were normal bilaterally [PERRL] : pupils were equal in size, round, and reactive to light [EOM Intact] : extraocular movements were intact [Yellow Sclera (Icteric)] : no scleral icterus was seen [Not Palpable] : not palpable [Tachycardia] : tachycardic [Irregularly Irregular] : irregularly irregular [Normal S1] : normal S1 [Normal S2] : normal S2 [No Murmur] : no murmurs heard [Anasarca] : anasarca edema present

## 2023-05-12 NOTE — DISCUSSION/SUMMARY
[FreeTextEntry1] : Patient is a 68 year-old liver transplant recipient with cardiovascular history as above including a history of heart failure with reduced ejection fraction, chronic LE edema, atrial fibrillation on anticoagulation, and DVT (after trauma) status post IVC filter. \par In the fall of 2020, patient was taking metoprolol succinate 50 mg BID and Entresto  mg BID, in addition to furosemide. She continues taking furosemide and metoprolol, but the Entresto was discontinued due to nonspecific complaints prior to hospitalizations for symptoms of unclear etiology, e.g. diarrhea, flank pain, and muscle spasms. \par \par No changes to her medications were suggested today.\par \par She has been strongly encouraged patient to work with a mental health professional about her stress, anxiety, and overall mental well being.\par \par She is without acute coronary syndrome, decompensated heart failure, dangerous arrhythmia, or critical valvular stenosis.\par No further cardiovascular testing is necessary prior to proceeding with scheduled cataract surgery.\par She may stop her Eliquis 72 hours prior to the cataract surgery, although the risks of bleeding are minimal.

## 2023-05-12 NOTE — HISTORY OF PRESENT ILLNESS
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 She does not have paroxysmal nocturnal dyspnea.\par Chronic lower extremity edema vs. lymphedema.\par \par September 2020 - Status post total hysterectomy, BSO, cystoscopy on 9/8/2020. She was discharged from [facility] on 9/9/2020. She transiently increased her steroids but then tapered back down. She has tolerated her Entresto 24-26 mg BID well.\par \par November 2020 - Patient returns today for follow-up. She has been tolerating Entresto 49-51 mg BID, with frequent TID, but some days she decides against the third dose because of symptoms that she associates with her surgery and radiation therapy, including diarrhea.\par \par May 2021 - Patient returns today for follow-up.\par Since her last visit here, she has been hospitalized several times for various symptoms, e.g. diarrhea, back pain, spasms, flank pain, but no etiology is seen to be found.\par Patient lives with her son and daughter-in-law who are in the process of moving. She is dealing with a lot of stress. \par \par August 2021 - Ms. Crisostomo returns today for preoperative cardiovascular evaluation prior to a planned endoscopy/colonoscopy with Dr. Bangura at [facility] on 9/3/2021. \par Since her last visit she has been feeling well in general, but she does fatigue easily. \par She denies any chest pain or exertional shortness of breath. \par Her medications were reviewed and she is taking all as directed. \par \par April 2023 - Patient returns today for follow-up more than a year since her last visit. She remains on Eliqiuis 5 mg BID for anticoagulation. She remains on metoprolol succinate 50 mg BID for rate control. \par Since her last visit here, she has had Covid-19 several times, which she believes is affecting her memory. She was also hospitalized several times for gout and associated infections. \par She has lost more than 20 lbs by emphasizing a plant based diet and maintaining her diuretic regimen.\par She is scheduled for cataract surgeries with Jaden Michel MD, on 5/25/2023 and 6/8/2023.\par \par PMD: Silvia Nash DO and Nayan Lee MD [phone] EP: Doron Byrd MD [phone]\par GI: Hoa Bangura MD [phone]\par Hepatologist: Kezia Foster MD [phone]\par Gyn: Lubna Kennedy MD [phone]\par Gyn-Onc: Rogers Paredes MD [phone]

## 2023-05-12 NOTE — CARDIOLOGY SUMMARY
[de-identified] : 8/28/2020, AFib with frequent PVCs\par 8/27/2021. AFib. Unchanged from prior [de-identified] : 9/1/2020, moderate LV dysfunction, enlarged LA size \par 5/5/2023, severely dilated LA, mild-moderate MR, regional wall motion abnormalities - inferior and inferoseptal hypokinesis with basal-mid inferior akinesis, eccentric LVH - overall mildly reduced LVEF at 50-55%

## 2023-05-13 ENCOUNTER — LABORATORY RESULT (OUTPATIENT)
Age: 68
End: 2023-05-13

## 2023-05-17 ENCOUNTER — RESULT CHARGE (OUTPATIENT)
Age: 68
End: 2023-05-17

## 2023-05-17 LAB
ALBUMIN SERPL ELPH-MCNC: 3.4 G/DL
ALP BLD-CCNC: 403 U/L
ALT SERPL-CCNC: 45 U/L
ANION GAP SERPL CALC-SCNC: 14 MMOL/L
APPEARANCE: ABNORMAL
AST SERPL-CCNC: 51 U/L
BACTERIA UR CULT: ABNORMAL
BACTERIA: ABNORMAL /HPF
BASOPHILS # BLD AUTO: 0.03 K/UL
BASOPHILS NFR BLD AUTO: 0.2 %
BILIRUB DIRECT SERPL-MCNC: 0.4 MG/DL
BILIRUB INDIRECT SERPL-MCNC: 0.7 MG/DL
BILIRUB SERPL-MCNC: 1.1 MG/DL
BILIRUBIN URINE: NEGATIVE
BLOOD URINE: NEGATIVE
BUN SERPL-MCNC: 19 MG/DL
CALCIUM SERPL-MCNC: 9.5 MG/DL
CHLORIDE SERPL-SCNC: 108 MMOL/L
CO2 SERPL-SCNC: 20 MMOL/L
COLOR: YELLOW
CREAT SERPL-MCNC: 0.74 MG/DL
EGFR: 89 ML/MIN/1.73M2
EOSINOPHIL # BLD AUTO: 0.23 K/UL
EOSINOPHIL NFR BLD AUTO: 1.9 %
GLUCOSE QUALITATIVE U: NEGATIVE MG/DL
GLUCOSE SERPL-MCNC: 133 MG/DL
HCT VFR BLD CALC: 44.2 %
HGB BLD-MCNC: 13.7 G/DL
HYALINE CASTS: PRESENT
IMM GRANULOCYTES NFR BLD AUTO: 0.2 %
KETONES URINE: NEGATIVE MG/DL
LEUKOCYTE ESTERASE URINE: ABNORMAL
LYMPHOCYTES # BLD AUTO: 2.62 K/UL
LYMPHOCYTES NFR BLD AUTO: 21.8 %
MAN DIFF?: NORMAL
MCHC RBC-ENTMCNC: 29.1 PG
MCHC RBC-ENTMCNC: 31 GM/DL
MCV RBC AUTO: 94 FL
MICROSCOPIC-UA: NORMAL
MONOCYTES # BLD AUTO: 0.92 K/UL
MONOCYTES NFR BLD AUTO: 7.6 %
NEUTROPHILS # BLD AUTO: 8.2 K/UL
NEUTROPHILS NFR BLD AUTO: 68.3 %
NITRITE URINE: NEGATIVE
PH URINE: 6.5
PLATELET # BLD AUTO: 274 K/UL
POTASSIUM SERPL-SCNC: 4.5 MMOL/L
PROT SERPL-MCNC: 7 G/DL
PROTEIN URINE: 30 MG/DL
RBC # BLD: 4.7 M/UL
RBC # FLD: 16.6 %
RED BLOOD CELLS URINE: 4 /HPF
SODIUM SERPL-SCNC: 141 MMOL/L
SPECIFIC GRAVITY URINE: 1.02
SQUAMOUS EPITHELIAL CELLS: 45
T4 FREE SERPL-MCNC: 1.3 NG/DL
TSH SERPL-ACNC: 0.13 UIU/ML
UROBILINOGEN URINE: 1 MG/DL
WBC # FLD AUTO: 12.03 K/UL
WHITE BLOOD CELLS URINE: 33 /HPF

## 2023-05-18 ENCOUNTER — APPOINTMENT (OUTPATIENT)
Dept: ENDOCRINOLOGY | Facility: CLINIC | Age: 68
End: 2023-05-18
Payer: MEDICARE

## 2023-05-18 ENCOUNTER — NON-APPOINTMENT (OUTPATIENT)
Age: 68
End: 2023-05-18

## 2023-05-18 DIAGNOSIS — E05.20 THYROTOXICOSIS WITH TOXIC MULTINODULAR GOITER W/OUT THYROTOXIC CRISIS OR STORM: ICD-10-CM

## 2023-05-18 PROCEDURE — 99451 NTRPROF PH1/NTRNET/EHR 5/>: CPT

## 2023-05-19 ENCOUNTER — APPOINTMENT (OUTPATIENT)
Dept: CARDIOLOGY | Facility: CLINIC | Age: 68
End: 2023-05-19
Payer: MEDICARE

## 2023-05-19 ENCOUNTER — NON-APPOINTMENT (OUTPATIENT)
Age: 68
End: 2023-05-19

## 2023-05-19 ENCOUNTER — APPOINTMENT (OUTPATIENT)
Dept: HEPATOLOGY | Facility: CLINIC | Age: 68
End: 2023-05-19
Payer: MEDICARE

## 2023-05-19 VITALS
OXYGEN SATURATION: 97 % | BODY MASS INDEX: 33.49 KG/M2 | TEMPERATURE: 97.1 F | HEIGHT: 65 IN | WEIGHT: 201 LBS | SYSTOLIC BLOOD PRESSURE: 131 MMHG | RESPIRATION RATE: 14 BRPM | DIASTOLIC BLOOD PRESSURE: 89 MMHG | HEART RATE: 107 BPM

## 2023-05-19 DIAGNOSIS — Z87.01 PERSONAL HISTORY OF PNEUMONIA (RECURRENT): ICD-10-CM

## 2023-05-19 PROCEDURE — 99214 OFFICE O/P EST MOD 30 MIN: CPT

## 2023-05-19 PROCEDURE — 93000 ELECTROCARDIOGRAM COMPLETE: CPT | Mod: NC

## 2023-05-22 ENCOUNTER — APPOINTMENT (OUTPATIENT)
Dept: OBGYN | Facility: CLINIC | Age: 68
End: 2023-05-22
Payer: MEDICARE

## 2023-05-22 VITALS
DIASTOLIC BLOOD PRESSURE: 74 MMHG | BODY MASS INDEX: 33.15 KG/M2 | HEIGHT: 65 IN | WEIGHT: 199 LBS | SYSTOLIC BLOOD PRESSURE: 114 MMHG

## 2023-05-22 DIAGNOSIS — C55 MALIGNANT NEOPLASM OF UTERUS, PART UNSPECIFIED: ICD-10-CM

## 2023-05-22 PROCEDURE — 99213 OFFICE O/P EST LOW 20 MIN: CPT

## 2023-05-22 NOTE — PHYSICAL EXAM
[Appropriately responsive] : appropriately responsive [Alert] : alert [No Acute Distress] : no acute distress [No Lymphadenopathy] : no lymphadenopathy [Soft] : soft [Non-tender] : non-tender [Non-distended] : non-distended [No HSM] : No HSM [No Lesions] : no lesions [No Mass] : no mass [Oriented x3] : oriented x3 [Examination Of The Breasts] : a normal appearance [No Masses] : no breast masses were palpable [Vulvar Atrophy] : vulvar atrophy [Labia Majora] : normal [Labia Minora] : normal [Normal] : normal [Atrophy] : atrophy [Absent] : absent [Uterine Adnexae] : normal [FreeTextEntry7] : large upper abdominal scar with 5 x 5 x 2 cm mass c/s prior hernia repair mesh. [FreeTextEntry4] : apices of vaginal vault with 5 cm herniation leading to suspected enterocele. [FreeTextEntry9] : Luz negative

## 2023-05-22 NOTE — DISCUSSION/SUMMARY
[FreeTextEntry1] : Health Maintenance:\par -Pap with HPV neg in 2020 (RAYMOND/BSO adenocarcinoma of uterus)\par -Mammo Rx\par -TBSE\par -colonoscopy guidelines reviewed with patient\par -Achieve Vit D levels of 30-40, intake of 1100 mg daily calcium mostly thru dark green\par leafy greens and milk products, exercise 30 minutes TIW\par -Vit D 24. Recent increase to \par \par BIRAD 4 US\par suggested  right US guided drainage of cyst Rx provided\par \par Adenocarcinoma:\par SEBASTIÁN\par suggest seeing gyn oncology next year and me in 2 yrs\par vaginal vault prolapse\par wears pad daily\par interested in care. Names of gyn urologists provided\par \par Apical defect vaginally\par possibly c/w enterocele at apex\par NESTOR noted by pt and need for Depends daily\par (pt is considering repair of upper abdominal hernia and removal of mesh)\par would like gyn urology evaluation\par \par \par \par \par

## 2023-05-22 NOTE — HISTORY OF PRESENT ILLNESS
[FreeTextEntry1] : 69 yo \par \par GYN:\par stage 1 B, grade 2 endometrial adenocancinoma. \par Last seen by gyn onc 23\par \par Final Pathology revealed Uterus, cervix hysterectomy: Uterus with endometrioid adenocarcinoma FIGO grade 2 involving both anterior and posterior endometrium and extending into the myometrium for a 1.3 cm, out of the total myometrial thickness of 1.8 cm. Focal lymphovascular invasion is identified. Negative uterine segment and negative Parametria. Left tube and ovary: Histologically unremarkable fallopian tube and ovary. Right tube and ovary: Histologically unremarkable fallopian tube and ovary\par \par Med:\par HTN\par DM\par obesity\par GERD\par DVT\par esophageal varices - <2004 resolved (upper endoscopy ~)\par UTI - enterobacter cloacae\par \par Sx:\par 2004Liver transplant\par 2020 RAYMOND/BSO\par left wrist joint infection -SBUH\par left elbow infection\par left knee infection\par \par FH:\par Mother - breast cancer age 50s,   dementia ([place])\par Father - 3/2020 COVID related age 92\par MGM - breast cancer\par MGreat Aunts x 3 breast cancer -  60s)\par P GM - breast cancer\par P Aunt - breast cancer\par P cousin - breast cancer\par \par \par \par \par  [Mammogramdate] : 5/10/23 [BreastSonogramDate] : 5/18/23 [PapSmeardate] : 1/2020 [TextBox_31] : Pap and HPV neg [BoneDensityDate] : ? [ColonoscopyDate] : 2020?

## 2023-05-23 ENCOUNTER — NON-APPOINTMENT (OUTPATIENT)
Age: 68
End: 2023-05-23

## 2023-05-23 ENCOUNTER — APPOINTMENT (OUTPATIENT)
Dept: FAMILY MEDICINE | Facility: CLINIC | Age: 68
End: 2023-05-23
Payer: MEDICARE

## 2023-05-23 VITALS
OXYGEN SATURATION: 96 % | WEIGHT: 197 LBS | DIASTOLIC BLOOD PRESSURE: 68 MMHG | BODY MASS INDEX: 32.82 KG/M2 | TEMPERATURE: 97.4 F | HEIGHT: 65 IN | HEART RATE: 97 BPM | SYSTOLIC BLOOD PRESSURE: 124 MMHG

## 2023-05-23 DIAGNOSIS — N63.0 UNSPECIFIED LUMP IN UNSPECIFIED BREAST: ICD-10-CM

## 2023-05-23 DIAGNOSIS — N39.0 URINARY TRACT INFECTION, SITE NOT SPECIFIED: ICD-10-CM

## 2023-05-23 DIAGNOSIS — H26.9 UNSPECIFIED CATARACT: ICD-10-CM

## 2023-05-23 PROCEDURE — 36415 COLL VENOUS BLD VENIPUNCTURE: CPT

## 2023-05-23 PROCEDURE — 99214 OFFICE O/P EST MOD 30 MIN: CPT | Mod: 25

## 2023-05-23 NOTE — HISTORY OF PRESENT ILLNESS
[FreeTextEntry1] : follow up [de-identified] : Needed re check of urine and lab work\par For cataract surgery L eye day after tomorrow\par Feels well

## 2023-05-27 PROBLEM — Z87.01 HISTORY OF PNEUMONIA: Status: RESOLVED | Noted: 2022-09-12 | Resolved: 2023-05-27

## 2023-05-27 LAB
AFP-TM SERPL-MCNC: 2.8 NG/ML
IGG SER QL IEP: 1780 MG/DL
INR PPP: 1.2 RATIO
PT BLD: 13.9 SEC
TACROLIMUS SERPL-MCNC: 10.8 NG/ML

## 2023-05-27 NOTE — ASSESSMENT
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 \par - FibroScan re-ordered today to be coordinated with her next visit for non-invasive re-staging of fibrosis.\par - Multiphase CT abdomen ordered today to rule out allograft cirrhosis or portal hypertension as well as for re-evaluation of her ventral hernia with mesh given that she is potentially interested in surgical revision (as below).\par - Most recent labs (5/13/23) with normal liver synthetic function and platelet count and overall stable elevations in liver enzymes in a predominantly cholestatic pattern, possibly representing some interval development of chronic allograft rejection vs possible congestive hepatopathy in the context of her CHF.\par - Re-check tacrolimus trough level, IgG, INR, and AFP with next labs (ordered today).\par - Pending lab results, continue current immunosuppression: tacrolimus 3.5 mg po q12h (for goal trough level ~6 ng/mL) and prednisone 5 mg po bid. MMF previously discontinued due to recurrent infections.\par \par # Ventral hernia with mesh: Palpable mesh in epigastrium from prior hernia repair causes her some discomfort. She is interested in consultation with her transplant surgeon Dr. Smita Lord to consider possible revision in future. Will try to coordinate consultation with him with her next visit here in 3 months.\par \par # Obesity and NIDDM2:\par - Diagnosed in 11/2020 but not on medication, with most recent HbA1c 7.0% (5/11/23), borderline for goal <7%.\par - She may benefit from a GLP-1 RA such as semaglutide given her obesity, but will defer to her endocrinologist especially as she is currently also undergoing evaluation of a thyroid nodule.\par - We have discussed gradual weight loss, exercise as tolerated, and Mediterranean style diet.\par \par # Long-term health maintenance of the liver transplant recipient:\par - BP: Goal <130/80 mmHg. Continuing follow-up with Dr. Mosley re: CHF medications including metoprolol and furosemide.\par - Lipids: Most recent lipid panel (5/11/23) with LDL at goal of <100 mg/dL and Tg at goal of <250 mg/dL. Re-check annually.\par - Renal function: Stable, with Cr 0.74 on most recent labs (5/13/23).\par - Bone health: DEXA previously ordered but not yet done. Will re-address at next visit. Continue vitamin D supplementation.\par - Dermatology: She was previously advised to use sun protection measures daily (sunscreen, hat, and long sleeves) and to continue q6 month follow-up with her dermatologist (overdue) for full skin evaluation and given her history of squamous cells cancers. Will re-address at next visit.\par - Cancer screening/surveillance: S/p repeat EGD and colonoscopy on 9/3/21 with her GI Dr. Bangura. Next due for repeat EGD/colonoscopy in 3 years (9/2024) per Dr. Bangura. Advised to continue follow-up with gynecologist oncologist Dr. Sun re: endometrial cancer surveillance.\par - Vaccinations: Not addressed today.\par \par Next follow-up: 3 months, with same-day FibroScan

## 2023-05-27 NOTE — HISTORY OF PRESENT ILLNESS
[de-identified] : Ms. Crisostomo is a 67 yo  F with AIH (diagnosed at age 29) with cirrhosis, s/p DDLT (at [facility] with Dr. Smita Lord in 2004), now with alloimmune hepatitis (with percutaneous liver biopsy on 11/6/18 that showed alloimmune hepatitis with no fibrosis). Her post-transplant medical history is also notable for: morbid obesity, NIDDM2 (diagnosed in 11/2020), history of DVTs (s/p IVC filter placement), chronic Afib (s/p prior cardioversions and ablation, on chronic anticoagulation with Eliquis), combined systolic and diastolic HFrEF (with most recent TTE on 5/5/23 with LVEF 50-55% with regional wall motion abnormalities with hypokinetic inferior and inferoseptal walls and akinetic basal-mid inferior wall, mild-moderate mitral regurgitation, mild-moderate aortic regurgitation, mild aortic stenosis, mild tricuspid regurgitation, and RVSP 36 mmHg consistent with borderline pulmonary hypertension), excisions of squamous cell carcinomas of the skin (right arm 2016 and 2018, right neck 2013, and above the right lip 2018), chronic BLE lymphedema, small hiatal hernia, gastric fundic gland polyps (with focal high-grade vs low-grade dysplasia of resected polyp in 2018), left ankle septic arthritis vs pseudogout (hospitalized 6/21/19-7/10/19), right hand/wrist infection (hospitalized 9/4/19-9/13/19), urosepsis (hospitalized 2/14/20-2/18/20), endometrial adenocarcinoma (stage 1B, grade 2, s/p total vaginal hysterectomy/BSO and cystoscopy 9/8/20, s/p vaginal cuff radiation therapy completed in 11/2020), history of COVID-19 (mild in 12/2021 though with prolonged diarrhea treated with a 14-day course of oral vancomycin for possible C difficile in 1/2022 and then severe in 6/2022 with a prolonged hospital due to associated hypoxemia requiring supplemental O2 s/p dexamethasone, transient delirium, and ADAL).\par \par PCP: Dr. Silvia Nash -> Dr. Carmelita Ruiz\par Cardiology: Dr. Favian Mosley\par Cardiology (EP): Dr. Doron Byrd ([facility])\par GI: Dr. Hoa Bangura\par Gynecology: Dr. Lubna Kennedy\par Gynecology Oncology: Dr. Rogers Paredes\par Nephrology: Dr. Demarcus Mauro\par Endocrinology: Dr. Chivo Nguyen\par \par She had a telephonic follow-up visit with me on 1/20/22 (>1 year ago) and is here today for her first in-person follow-up visit with me in a long time. After her hospitalization at [facility] from 6/28/22-7/4/22 she was discharged to a rehabilitation facility and only returned home on 9/1/22. She is now back home living with her son, daughter-in-law, 5-year-old twin grandsons, and new 7-month-old grandson. She is now retired. She is scheduled to undergo cataract surgeries soon on 5/25/23 and 6/8/23. She underwent a thyroid ultrasound yesterday for evaluation of a nodule. She is currently on day 3 of a 7-day course of Macrobid prescribed by her PCP due to an Enterobacter cloacae complex UTI (on urine culture from 5/13/23). Her current immunosuppression is: prednisone 5 mg po bid and tacrolimus 3.5 mg po q12h. She remains off Cellcept. 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 She complains of noticing and having discomfort related to her epigastric mesh, particularly when she bends down to tie her shoes or when going up stairs.

## 2023-05-27 NOTE — REVIEW OF SYSTEMS
[Feeling Poorly] : feeling poorly [Feeling Tired] : feeling tired [Recent Weight Loss (___ Lbs)] : recent [unfilled] ~Ulb weight loss [Nasal Discharge] : nasal discharge [Hoarseness] : hoarseness [Lower Ext Edema] : lower extremity edema [SOB on Exertion] : shortness of breath during exertion [Diarrhea] : diarrhea [Arthralgias] : arthralgias [Difficulty Walking] : difficulty walking [Sleep Disturbances] : sleep disturbances [Anxiety] : anxiety [Depression] : depression [Negative] : Heme/Lymph [As Noted in HPI] : as noted in HPI

## 2023-06-06 ENCOUNTER — NON-APPOINTMENT (OUTPATIENT)
Age: 68
End: 2023-06-06

## 2023-06-06 LAB
APPEARANCE: CLEAR
BACTERIA UR CULT: NORMAL
BACTERIA: ABNORMAL /HPF
BILIRUBIN URINE: NEGATIVE
BLOOD URINE: NEGATIVE
CAST: 0 /LPF
COLOR: NORMAL
EPITHELIAL CELLS: 10 /HPF
FERRITIN SERPL-MCNC: 98 NG/ML
FOLATE SERPL-MCNC: 9.2 NG/ML
GLUCOSE QUALITATIVE U: NEGATIVE MG/DL
IRON SATN MFR SERPL: 18 %
IRON SERPL-MCNC: 49 UG/DL
KETONES URINE: NEGATIVE MG/DL
LEUKOCYTE ESTERASE URINE: NEGATIVE
MAGNESIUM SERPL-MCNC: 1.4 MG/DL
MICROSCOPIC-UA: NORMAL
NITRITE URINE: NEGATIVE
PH URINE: 6.5
PHOSPHATE SERPL-MCNC: 2.9 MG/DL
PROTEIN URINE: NORMAL MG/DL
RED BLOOD CELLS URINE: 1 /HPF
SPECIFIC GRAVITY URINE: 1.01
TIBC SERPL-MCNC: 267 UG/DL
UIBC SERPL-MCNC: 219 UG/DL
UROBILINOGEN URINE: 1 MG/DL
VIT B12 SERPL-MCNC: 901 PG/ML
WHITE BLOOD CELLS URINE: 1 /HPF

## 2023-06-29 ENCOUNTER — RX RENEWAL (OUTPATIENT)
Age: 68
End: 2023-06-29

## 2023-07-19 ENCOUNTER — RX RENEWAL (OUTPATIENT)
Age: 68
End: 2023-07-19

## 2023-07-24 ENCOUNTER — APPOINTMENT (OUTPATIENT)
Dept: BREAST CENTER | Facility: CLINIC | Age: 68
End: 2023-07-24
Payer: MEDICARE

## 2023-07-24 VITALS
HEART RATE: 94 BPM | SYSTOLIC BLOOD PRESSURE: 130 MMHG | HEIGHT: 65 IN | WEIGHT: 197 LBS | BODY MASS INDEX: 32.82 KG/M2 | DIASTOLIC BLOOD PRESSURE: 93 MMHG

## 2023-07-24 DIAGNOSIS — Z80.1 FAMILY HISTORY OF MALIGNANT NEOPLASM OF TRACHEA, BRONCHUS AND LUNG: ICD-10-CM

## 2023-07-24 DIAGNOSIS — Z80.3 FAMILY HISTORY OF MALIGNANT NEOPLASM OF BREAST: ICD-10-CM

## 2023-07-24 DIAGNOSIS — Z13.79 ENCOUNTER FOR OTHER SCREENING FOR GENETIC AND CHROMOSOMAL ANOMALIES: ICD-10-CM

## 2023-07-24 PROCEDURE — 99204 OFFICE O/P NEW MOD 45 MIN: CPT

## 2023-07-24 RX ORDER — KETOROLAC TROMETHAMINE 5 MG/ML
0.5 SOLUTION OPHTHALMIC
Qty: 5 | Refills: 0 | Status: DISCONTINUED | COMMUNITY
Start: 2023-06-26

## 2023-07-24 RX ORDER — OFLOXACIN 3 MG/ML
0.3 SOLUTION/ DROPS OPHTHALMIC
Qty: 5 | Refills: 0 | Status: DISCONTINUED | COMMUNITY
Start: 2023-05-26

## 2023-07-24 RX ORDER — NITROFURANTOIN (MONOHYDRATE/MACROCRYSTALS) 25; 75 MG/1; MG/1
100 CAPSULE ORAL TWICE DAILY
Qty: 14 | Refills: 0 | Status: DISCONTINUED | COMMUNITY
Start: 2018-12-26 | End: 2023-07-24

## 2023-07-24 RX ORDER — TOBRAMYCIN AND DEXAMETHASONE 3; 1 MG/ML; MG/ML
0.3-0.1 SUSPENSION/ DROPS OPHTHALMIC
Qty: 5 | Refills: 0 | Status: DISCONTINUED | COMMUNITY
Start: 2023-06-26

## 2023-07-24 NOTE — PAST MEDICAL HISTORY
[Postmenopausal] : The patient is postmenopausal [Menarche Age ____] : age at menarche was [unfilled] [Total Preg ___] : G[unfilled] [Live Births ___] : P[unfilled]  [Age At Live Birth ___] : Age at live birth: [unfilled] [FreeTextEntry8] : 4 yr

## 2023-07-24 NOTE — CONSULT LETTER
[Dear  ___] : Dear  [unfilled], [Consult Letter:] : I had the pleasure of evaluating your patient, [unfilled]. [Please see my note below.] : Please see my note below. [Consult Closing:] : Thank you very much for allowing me to participate in the care of this patient.  If you have any questions, please do not hesitate to contact me. [Sincerely,] : Sincerely, [DrFrantz  ___] : Dr. BURNS [FreeTextEntry3] : Parisa Hill MD FACS

## 2023-07-24 NOTE — HISTORY OF PRESENT ILLNESS
[FreeTextEntry1] : Ms. Crisostomo is a 68 year old woman who presents for a consultation for right breast papilloma with LCIS. She is asymptomatic. No palpable breast or axillary lumps, nipple discharge, or skin changes. \par \par Her medical history includes liver transplant on immunosuppressants, frequent UTI's and multiple infections after joint surgeries, and atrial fibrillation on eliquis.\par \par Her family history is significant for breast cancer in a paternal aunt in her late 40's, the paternal grandmother in her 50's, two of her father's cousins (PGF's sister's daughters; one in her 30's), the maternal grandmother in her early 60's, and four maternal great-aunts (MGM's sisters) in their 60's.

## 2023-07-24 NOTE — DATA REVIEWED
[FreeTextEntry1] : I have independently reviewed the reports and the images. \par \par B/l mammogram 5/10/23\par - scattered fibroglandular density\par - BIRADS 2\par \par B/l US 5/18/23\par - 0.9 cm nodule in R retroareolar; US bx\par - BIRADS 4\par \par US bx 7/13/23\par - R retroareolar, clip = LCIS classic type, sclerosing papilloma

## 2023-07-26 ENCOUNTER — OUTPATIENT (OUTPATIENT)
Dept: OUTPATIENT SERVICES | Facility: HOSPITAL | Age: 68
LOS: 1 days | End: 2023-07-26
Payer: MEDICARE

## 2023-07-26 ENCOUNTER — RESULT REVIEW (OUTPATIENT)
Age: 68
End: 2023-07-26

## 2023-07-26 ENCOUNTER — APPOINTMENT (OUTPATIENT)
Dept: MAMMOGRAPHY | Facility: CLINIC | Age: 68
End: 2023-07-26
Payer: MEDICARE

## 2023-07-26 DIAGNOSIS — Z94.4 LIVER TRANSPLANT STATUS: Chronic | ICD-10-CM

## 2023-07-26 DIAGNOSIS — Z98.890 OTHER SPECIFIED POSTPROCEDURAL STATES: Chronic | ICD-10-CM

## 2023-07-26 DIAGNOSIS — Z95.828 PRESENCE OF OTHER VASCULAR IMPLANTS AND GRAFTS: Chronic | ICD-10-CM

## 2023-07-26 DIAGNOSIS — I49.9 CARDIAC ARRHYTHMIA, UNSPECIFIED: Chronic | ICD-10-CM

## 2023-07-26 DIAGNOSIS — C54.1 MALIGNANT NEOPLASM OF ENDOMETRIUM: Chronic | ICD-10-CM

## 2023-07-26 DIAGNOSIS — Z87.798 PERSONAL HISTORY OF OTHER (CORRECTED) CONGENITAL MALFORMATIONS: Chronic | ICD-10-CM

## 2023-07-26 DIAGNOSIS — M11.20 OTHER CHONDROCALCINOSIS, UNSPECIFIED SITE: Chronic | ICD-10-CM

## 2023-07-26 DIAGNOSIS — Z91.89 OTHER SPECIFIED PERSONAL RISK FACTORS, NOT ELSEWHERE CLASSIFIED: ICD-10-CM

## 2023-07-26 PROCEDURE — G0279: CPT

## 2023-07-26 PROCEDURE — 77066 DX MAMMO INCL CAD BI: CPT

## 2023-07-26 PROCEDURE — G0279: CPT | Mod: 26

## 2023-07-26 PROCEDURE — 77066 DX MAMMO INCL CAD BI: CPT | Mod: 26

## 2023-08-02 ENCOUNTER — OUTPATIENT (OUTPATIENT)
Dept: OUTPATIENT SERVICES | Facility: HOSPITAL | Age: 68
LOS: 1 days | End: 2023-08-02
Payer: MEDICARE

## 2023-08-02 ENCOUNTER — RESULT REVIEW (OUTPATIENT)
Age: 68
End: 2023-08-02

## 2023-08-02 ENCOUNTER — NON-APPOINTMENT (OUTPATIENT)
Age: 68
End: 2023-08-02

## 2023-08-02 DIAGNOSIS — Z98.890 OTHER SPECIFIED POSTPROCEDURAL STATES: Chronic | ICD-10-CM

## 2023-08-02 DIAGNOSIS — I49.9 CARDIAC ARRHYTHMIA, UNSPECIFIED: Chronic | ICD-10-CM

## 2023-08-02 DIAGNOSIS — D05.01 LOBULAR CARCINOMA IN SITU OF RIGHT BREAST: ICD-10-CM

## 2023-08-02 DIAGNOSIS — C54.1 MALIGNANT NEOPLASM OF ENDOMETRIUM: Chronic | ICD-10-CM

## 2023-08-02 DIAGNOSIS — Z94.4 LIVER TRANSPLANT STATUS: Chronic | ICD-10-CM

## 2023-08-02 DIAGNOSIS — Z87.798 PERSONAL HISTORY OF OTHER (CORRECTED) CONGENITAL MALFORMATIONS: Chronic | ICD-10-CM

## 2023-08-02 DIAGNOSIS — M11.20 OTHER CHONDROCALCINOSIS, UNSPECIFIED SITE: Chronic | ICD-10-CM

## 2023-08-02 DIAGNOSIS — Z95.828 PRESENCE OF OTHER VASCULAR IMPLANTS AND GRAFTS: Chronic | ICD-10-CM

## 2023-08-02 DIAGNOSIS — Z98.41 CATARACT EXTRACTION STATUS, RIGHT EYE: Chronic | ICD-10-CM

## 2023-08-02 DIAGNOSIS — Z01.818 ENCOUNTER FOR OTHER PREPROCEDURAL EXAMINATION: ICD-10-CM

## 2023-08-02 LAB
ALBUMIN SERPL ELPH-MCNC: 2.9 G/DL — LOW (ref 3.3–5)
ALP SERPL-CCNC: 223 U/L — HIGH (ref 40–120)
ALT FLD-CCNC: 34 U/L — SIGNIFICANT CHANGE UP (ref 12–78)
ANION GAP SERPL CALC-SCNC: 10 MMOL/L — SIGNIFICANT CHANGE UP (ref 5–17)
APTT BLD: 34.3 SEC — SIGNIFICANT CHANGE UP (ref 24.5–35.6)
AST SERPL-CCNC: 26 U/L — SIGNIFICANT CHANGE UP (ref 15–37)
BASOPHILS # BLD AUTO: 0.04 K/UL — SIGNIFICANT CHANGE UP (ref 0–0.2)
BASOPHILS NFR BLD AUTO: 0.3 % — SIGNIFICANT CHANGE UP (ref 0–2)
BILIRUB SERPL-MCNC: 1.1 MG/DL — SIGNIFICANT CHANGE UP (ref 0.2–1.2)
BLD GP AB SCN SERPL QL: SIGNIFICANT CHANGE UP
BUN SERPL-MCNC: 22 MG/DL — SIGNIFICANT CHANGE UP (ref 7–23)
CALCIUM SERPL-MCNC: 9.3 MG/DL — SIGNIFICANT CHANGE UP (ref 8.5–10.1)
CHLORIDE SERPL-SCNC: 103 MMOL/L — SIGNIFICANT CHANGE UP (ref 96–108)
CO2 SERPL-SCNC: 21 MMOL/L — LOW (ref 22–31)
CREAT SERPL-MCNC: 1.43 MG/DL — HIGH (ref 0.5–1.3)
EGFR: 40 ML/MIN/1.73M2 — LOW
EOSINOPHIL # BLD AUTO: 0.14 K/UL — SIGNIFICANT CHANGE UP (ref 0–0.5)
EOSINOPHIL NFR BLD AUTO: 1.1 % — SIGNIFICANT CHANGE UP (ref 0–6)
GLUCOSE SERPL-MCNC: 744 MG/DL — CRITICAL HIGH (ref 70–99)
HCT VFR BLD CALC: 44.9 % — SIGNIFICANT CHANGE UP (ref 34.5–45)
HGB BLD-MCNC: 14.7 G/DL — SIGNIFICANT CHANGE UP (ref 11.5–15.5)
IMM GRANULOCYTES NFR BLD AUTO: 0.4 % — SIGNIFICANT CHANGE UP (ref 0–0.9)
INR BLD: 0.99 RATIO — SIGNIFICANT CHANGE UP (ref 0.85–1.18)
LYMPHOCYTES # BLD AUTO: 2.97 K/UL — SIGNIFICANT CHANGE UP (ref 1–3.3)
LYMPHOCYTES # BLD AUTO: 23.5 % — SIGNIFICANT CHANGE UP (ref 13–44)
MCHC RBC-ENTMCNC: 30.2 PG — SIGNIFICANT CHANGE UP (ref 27–34)
MCHC RBC-ENTMCNC: 32.7 GM/DL — SIGNIFICANT CHANGE UP (ref 32–36)
MCV RBC AUTO: 92.2 FL — SIGNIFICANT CHANGE UP (ref 80–100)
MONOCYTES # BLD AUTO: 1.03 K/UL — HIGH (ref 0–0.9)
MONOCYTES NFR BLD AUTO: 8.1 % — SIGNIFICANT CHANGE UP (ref 2–14)
NEUTROPHILS # BLD AUTO: 8.41 K/UL — HIGH (ref 1.8–7.4)
NEUTROPHILS NFR BLD AUTO: 66.6 % — SIGNIFICANT CHANGE UP (ref 43–77)
PLATELET # BLD AUTO: 253 K/UL — SIGNIFICANT CHANGE UP (ref 150–400)
POTASSIUM SERPL-MCNC: 3.8 MMOL/L — SIGNIFICANT CHANGE UP (ref 3.5–5.3)
POTASSIUM SERPL-SCNC: 3.8 MMOL/L — SIGNIFICANT CHANGE UP (ref 3.5–5.3)
PROT SERPL-MCNC: 7.2 GM/DL — SIGNIFICANT CHANGE UP (ref 6–8.3)
PROTHROM AB SERPL-ACNC: 11.2 SEC — SIGNIFICANT CHANGE UP (ref 9.5–13)
RBC # BLD: 4.87 M/UL — SIGNIFICANT CHANGE UP (ref 3.8–5.2)
RBC # FLD: 14.4 % — SIGNIFICANT CHANGE UP (ref 10.3–14.5)
SODIUM SERPL-SCNC: 134 MMOL/L — LOW (ref 135–145)
WBC # BLD: 12.64 K/UL — HIGH (ref 3.8–10.5)
WBC # FLD AUTO: 12.64 K/UL — HIGH (ref 3.8–10.5)

## 2023-08-02 PROCEDURE — 88321 CONSLTJ&REPRT SLD PREP ELSWR: CPT

## 2023-08-02 PROCEDURE — 93010 ELECTROCARDIOGRAM REPORT: CPT

## 2023-08-02 NOTE — ASU PATIENT PROFILE, ADULT - NSICDXPASTSURGICALHX_GEN_ALL_CORE_FT
PAST SURGICAL HISTORY:  Endometrial cancer hysterectomy  September 2021    Iza filter in place     H/O ligation of vein     H/O prior ablation treatment 1/2018 and cardioversion    H/O wrist surgery Right ---I& D    History of abdominal surgery     History of bilateral cataract extraction     History of incision and drainage Multiple, various joints--both wrists, left elbow, left shoulder, left knee    History of liver transplant 2004    History of loop recorder prior to 2019    History of thyroglossal duct cyst removal 1962    Pseudogout left shoulder and elbow 2017--irrigation and debridement

## 2023-08-02 NOTE — ASU PATIENT PROFILE, ADULT - FALL HARM RISK - UNIVERSAL INTERVENTIONS
Bed in lowest position, wheels locked, appropriate side rails in place/Call bell, personal items and telephone in reach/Instruct patient to call for assistance before getting out of bed or chair/Non-slip footwear when patient is out of bed/[place] to call system/Physically safe environment - no spills, clutter or unnecessary equipment/Purposeful Proactive Rounding/Room/bathroom lighting operational, light cord in reach

## 2023-08-02 NOTE — ASU PATIENT PROFILE, ADULT - NSICDXPASTMEDICALHX_GEN_ALL_CORE_FT
PAST MEDICAL HISTORY:  Afib     Anemia     Autoimmune hepatitis diagnosed age 29    Bilateral cataracts     Breast mass, right     Colon polyp and duodenal and stomach polyp    DVT (deep venous thrombosis) LLE    Endometrial cancer September 2020    H/O esophageal varices     History of pseudogout     HTN (hypertension)     Liver disease had liver transplant in 2004    Lymphedema of leg ambulates with walkder    Migraines     Pain esophageal banding due to esophageal varices    Prediabetes     Scoliosis     Squamous cell skin cancer neck face arms

## 2023-08-03 ENCOUNTER — NON-APPOINTMENT (OUTPATIENT)
Age: 68
End: 2023-08-03

## 2023-08-03 ENCOUNTER — APPOINTMENT (OUTPATIENT)
Dept: FAMILY MEDICINE | Facility: CLINIC | Age: 68
End: 2023-08-03

## 2023-08-03 DIAGNOSIS — D05.01 LOBULAR CARCINOMA IN SITU OF RIGHT BREAST: ICD-10-CM

## 2023-08-03 DIAGNOSIS — Z01.818 ENCOUNTER FOR OTHER PREPROCEDURAL EXAMINATION: ICD-10-CM

## 2023-08-03 LAB
A1C WITH ESTIMATED AVERAGE GLUCOSE RESULT: 11 % — HIGH (ref 4–5.6)
ESTIMATED AVERAGE GLUCOSE: 269 MG/DL — HIGH (ref 68–114)

## 2023-08-03 NOTE — CHART NOTE - NSCHARTNOTEFT_GEN_A_CORE
8/2/2023--68 y.o female scheduled for Right Excisional Biopsy with Magseed Localization  VS: BP- 120/67  P- 103   T- 96.8  SpO2- 97%  Plan  1. Stop all NSAIDS, herbal supplements and vitamins for 7 days. Eliquis per cardiology   2. NPO at midnight.  3. Take the following medications Tacrolimus, Prednisone, Metoprolol, Ursidol, Omeprazole with small sips of water on the morning of your procedure/surgery.  4. Use EZ sponges as directed  5. Labs, EKG as per surgeon  6. PMD EDSON Mosley cardiology  visit for optimization prior to surgery as per surgeon  7. Preprocedure education provided
8/3/2023--PST -review A1C 11.0, Glu-744. Discussed with GIL Camp, if not an urgent or emergent case should be postponed. Call to Dr Hill's office , she is aware of glucose and A1C result, patient sent to [facility] for evaluation. Labs faxed to PCP as well.

## 2023-08-04 ENCOUNTER — APPOINTMENT (OUTPATIENT)
Dept: CARDIOLOGY | Facility: CLINIC | Age: 68
End: 2023-08-04

## 2023-08-08 PROBLEM — N63.10 UNSPECIFIED LUMP IN THE RIGHT BREAST, UNSPECIFIED QUADRANT: Chronic | Status: ACTIVE | Noted: 2023-08-02

## 2023-08-08 PROBLEM — H26.9 UNSPECIFIED CATARACT: Chronic | Status: ACTIVE | Noted: 2023-08-02

## 2023-08-08 PROBLEM — I89.0 LYMPHEDEMA, NOT ELSEWHERE CLASSIFIED: Chronic | Status: ACTIVE | Noted: 2020-08-31

## 2023-08-08 LAB — SURGICAL PATHOLOGY STUDY: SIGNIFICANT CHANGE UP

## 2023-08-09 ENCOUNTER — APPOINTMENT (OUTPATIENT)
Dept: BREAST CENTER | Facility: HOSPITAL | Age: 68
End: 2023-08-09

## 2023-08-11 ENCOUNTER — APPOINTMENT (OUTPATIENT)
Dept: FAMILY MEDICINE | Facility: CLINIC | Age: 68
End: 2023-08-11
Payer: MEDICARE

## 2023-08-11 VITALS
DIASTOLIC BLOOD PRESSURE: 70 MMHG | SYSTOLIC BLOOD PRESSURE: 120 MMHG | HEIGHT: 65 IN | HEART RATE: 89 BPM | OXYGEN SATURATION: 98 % | TEMPERATURE: 98.1 F

## 2023-08-11 DIAGNOSIS — E11.9 TYPE 2 DIABETES MELLITUS W/OUT COMPLICATIONS: ICD-10-CM

## 2023-08-11 DIAGNOSIS — R79.89 OTHER SPECIFIED ABNORMAL FINDINGS OF BLOOD CHEMISTRY: ICD-10-CM

## 2023-08-11 PROCEDURE — 36415 COLL VENOUS BLD VENIPUNCTURE: CPT

## 2023-08-11 PROCEDURE — 99496 TRANSJ CARE MGMT HIGH F2F 7D: CPT | Mod: 25

## 2023-08-11 NOTE — PHYSICAL EXAM
[No Acute Distress] : no acute distress [Well Nourished] : well nourished [Well Developed] : well developed [Well-Appearing] : well-appearing [Normal Voice/Communication] : normal voice/communication [Normal Sclera/Conjunctiva] : normal sclera/conjunctiva [PERRL] : pupils equal round and reactive to light [Normal Outer Ear/Nose] : the outer ears and nose were normal in appearance [No JVD] : no jugular venous distention [No Respiratory Distress] : no respiratory distress  [No Accessory Muscle Use] : no accessory muscle use [Clear to Auscultation] : lungs were clear to auscultation bilaterally [Normal Rate] : normal rate  [Regular Rhythm] : with a regular rhythm [Normal S1, S2] : normal S1 and S2 [No Murmur] : no murmur heard [No Edema] : there was no peripheral edema [Soft] : abdomen soft [Non Tender] : non-tender [Non-distended] : non-distended [No Masses] : no abdominal mass palpated [No HSM] : no HSM [Normal Bowel Sounds] : normal bowel sounds [No CVA Tenderness] : no CVA  tenderness [No Spinal Tenderness] : no spinal tenderness [No Joint Swelling] : no joint swelling [Grossly Normal Strength/Tone] : grossly normal strength/tone [No Rash] : no rash [Coordination Grossly Intact] : coordination grossly intact [No Focal Deficits] : no focal deficits [Normal Gait] : normal gait [Speech Grossly Normal] : speech grossly normal [Normal Affect] : the affect was normal [Alert and Oriented x3] : oriented to person, place, and time [Normal Mood] : the mood was normal [Normal Insight/Judgement] : insight and judgment were intact

## 2023-08-11 NOTE — HISTORY OF PRESENT ILLNESS
[Post-hospitalization from ___ Hospital] : Post-hospitalization from [unfilled] Hospital [Pertinent Labs] : pertinent labs [Med Reconciliation] : medication reconciliation has been completed [FreeTextEntry2] : went to ER for sugars >700 . started on insulin and discharged  states feeling better  monitoring sugars  low carb diet  has appt with endocrinology this month

## 2023-08-15 ENCOUNTER — APPOINTMENT (OUTPATIENT)
Dept: BREAST CENTER | Facility: CLINIC | Age: 68
End: 2023-08-15

## 2023-08-18 LAB
ALBUMIN SERPL ELPH-MCNC: 3.5 G/DL
ALP BLD-CCNC: 187 IU/L
ALP BLD-CCNC: 193 U/L
ALP BONE CFR SERPL: 27 %
ALP INTEST CFR SERPL: 12 %
ALP LIVER CFR SERPL: 61 %
ALT SERPL-CCNC: 40 U/L
ANION GAP SERPL CALC-SCNC: 10 MMOL/L
APPEARANCE: CLEAR
AST SERPL-CCNC: 49 U/L
BACTERIA: NEGATIVE /HPF
BILIRUB SERPL-MCNC: 0.8 MG/DL
BILIRUBIN URINE: NEGATIVE
BLOOD URINE: NEGATIVE
BUN SERPL-MCNC: 21 MG/DL
CALCIUM SERPL-MCNC: 10.2 MG/DL
CAST: 0 /LPF
CHLORIDE SERPL-SCNC: 107 MMOL/L
CHOLEST SERPL-MCNC: 167 MG/DL
CO2 SERPL-SCNC: 21 MMOL/L
COLOR: YELLOW
CREAT SERPL-MCNC: 0.68 MG/DL
CREAT SPEC-SCNC: 49 MG/DL
CREAT/PROT UR: 0.2 RATIO
EGFR: 96 ML/MIN/1.73M2
EPITHELIAL CELLS: 2 /HPF
ESTIMATED AVERAGE GLUCOSE: 258 MG/DL
GLUCOSE QUALITATIVE U: NEGATIVE MG/DL
GLUCOSE SERPL-MCNC: 103 MG/DL
HBA1C MFR BLD HPLC: 10.6 %
HDLC SERPL-MCNC: 74 MG/DL
KETONES URINE: NEGATIVE MG/DL
LDLC SERPL CALC-MCNC: 76 MG/DL
LEUKOCYTE ESTERASE URINE: ABNORMAL
MICROSCOPIC-UA: NORMAL
NITRITE URINE: NEGATIVE
NONHDLC SERPL-MCNC: 93 MG/DL
PH URINE: 6
POTASSIUM SERPL-SCNC: 4.9 MMOL/L
PROT SERPL-MCNC: 6.5 G/DL
PROT UR-MCNC: 12 MG/DL
PROTEIN URINE: NEGATIVE MG/DL
RED BLOOD CELLS URINE: 1 /HPF
SODIUM SERPL-SCNC: 138 MMOL/L
SPECIFIC GRAVITY URINE: 1.01
T3 SERPL-MCNC: 107 NG/DL
T3FREE SERPL-MCNC: 2.61 PG/ML
T4 FREE SERPL-MCNC: 1.4 NG/DL
T4 SERPL-MCNC: 8.1 UG/DL
THYROGLOB AB SERPL-ACNC: <20 IU/ML
THYROPEROXIDASE AB SERPL IA-ACNC: <10 IU/ML
TRIGL SERPL-MCNC: 89 MG/DL
TSH SERPL-ACNC: 0.08 UIU/ML
TSI ACT/NOR SER: <0.1 IU/L
UROBILINOGEN URINE: 0.2 MG/DL
WHITE BLOOD CELLS URINE: 1 /HPF

## 2023-08-21 DIAGNOSIS — D72.829 ELEVATED WHITE BLOOD CELL COUNT, UNSPECIFIED: ICD-10-CM

## 2023-08-29 ENCOUNTER — RX ONLY (RX ONLY)
Age: 68
End: 2023-08-29

## 2023-08-29 ENCOUNTER — OFFICE (OUTPATIENT)
Facility: LOCATION | Age: 68
Setting detail: OPHTHALMOLOGY
End: 2023-08-29
Payer: MEDICARE

## 2023-08-29 DIAGNOSIS — E11.9: ICD-10-CM

## 2023-08-29 DIAGNOSIS — H43.813: ICD-10-CM

## 2023-08-29 DIAGNOSIS — H26.493: ICD-10-CM

## 2023-08-29 PROBLEM — H01.002 BLEPHARITIS; RIGHT UPPER LID, RIGHT LOWER LID, LEFT UPPER LID, LEFT LOWER LID: Status: ACTIVE | Noted: 2023-08-29

## 2023-08-29 PROBLEM — H01.001 BLEPHARITIS; RIGHT UPPER LID, RIGHT LOWER LID, LEFT UPPER LID, LEFT LOWER LID: Status: ACTIVE | Noted: 2023-08-29

## 2023-08-29 PROBLEM — H01.004 BLEPHARITIS; RIGHT UPPER LID, RIGHT LOWER LID, LEFT UPPER LID, LEFT LOWER LID: Status: ACTIVE | Noted: 2023-08-29

## 2023-08-29 PROBLEM — H31.29 PERIPAPILLARY ATROPHY ; BOTH EYES: Status: ACTIVE | Noted: 2023-08-29

## 2023-08-29 PROBLEM — Z96.1 PSEUDOPHAKIA ; BOTH EYES: Status: ACTIVE | Noted: 2023-08-29

## 2023-08-29 PROBLEM — H01.005 BLEPHARITIS; RIGHT UPPER LID, RIGHT LOWER LID, LEFT UPPER LID, LEFT LOWER LID: Status: ACTIVE | Noted: 2023-08-29

## 2023-08-29 PROCEDURE — 92250 FUNDUS PHOTOGRAPHY W/I&R: CPT | Performed by: OPHTHALMOLOGY

## 2023-08-29 PROCEDURE — 99213 OFFICE O/P EST LOW 20 MIN: CPT | Performed by: OPHTHALMOLOGY

## 2023-08-29 ASSESSMENT — LID EXAM ASSESSMENTS
OS_BLEPHARITIS: LLL LUL
OD_BLEPHARITIS: RLL RUL

## 2023-08-29 ASSESSMENT — CONFRONTATIONAL VISUAL FIELD TEST (CVF)
OD_FINDINGS: FULL
OS_FINDINGS: FULL

## 2023-08-31 ENCOUNTER — APPOINTMENT (OUTPATIENT)
Dept: FAMILY MEDICINE | Facility: CLINIC | Age: 68
End: 2023-08-31
Payer: MEDICARE

## 2023-08-31 DIAGNOSIS — R74.8 ABNORMAL LEVELS OF OTHER SERUM ENZYMES: ICD-10-CM

## 2023-08-31 PROCEDURE — 36415 COLL VENOUS BLD VENIPUNCTURE: CPT

## 2023-08-31 RX ORDER — METFORMIN HYDROCHLORIDE 500 MG/1
500 TABLET, COATED ORAL
Qty: 180 | Refills: 0 | Status: DISCONTINUED | COMMUNITY
End: 2023-08-31

## 2023-08-31 RX ORDER — URSODIOL 500 MG/1
500 TABLET ORAL TWICE DAILY
Qty: 180 | Refills: 3 | Status: DISCONTINUED | COMMUNITY
Start: 2023-05-19 | End: 2023-08-31

## 2023-08-31 ASSESSMENT — VISUAL ACUITY
OD_BCVA: 20/20-2
OS_BCVA: 20/20-1

## 2023-09-08 LAB
ALP BLD-CCNC: 305 IU/L
ALP BONE CFR SERPL: 27 %
ALP INTEST CFR SERPL: 8 %
ALP LIVER CFR SERPL: 65 %

## 2023-09-20 ENCOUNTER — APPOINTMENT (OUTPATIENT)
Dept: ULTRASOUND IMAGING | Facility: CLINIC | Age: 68
End: 2023-09-20
Payer: MEDICARE

## 2023-09-20 ENCOUNTER — OUTPATIENT (OUTPATIENT)
Dept: OUTPATIENT SERVICES | Facility: HOSPITAL | Age: 68
LOS: 1 days | End: 2023-09-20
Payer: MEDICARE

## 2023-09-20 DIAGNOSIS — Z98.890 OTHER SPECIFIED POSTPROCEDURAL STATES: Chronic | ICD-10-CM

## 2023-09-20 DIAGNOSIS — Z95.828 PRESENCE OF OTHER VASCULAR IMPLANTS AND GRAFTS: Chronic | ICD-10-CM

## 2023-09-20 DIAGNOSIS — M11.20 OTHER CHONDROCALCINOSIS, UNSPECIFIED SITE: Chronic | ICD-10-CM

## 2023-09-20 DIAGNOSIS — R74.8 ABNORMAL LEVELS OF OTHER SERUM ENZYMES: ICD-10-CM

## 2023-09-20 DIAGNOSIS — Z87.798 PERSONAL HISTORY OF OTHER (CORRECTED) CONGENITAL MALFORMATIONS: Chronic | ICD-10-CM

## 2023-09-20 DIAGNOSIS — Z94.4 LIVER TRANSPLANT STATUS: Chronic | ICD-10-CM

## 2023-09-20 DIAGNOSIS — C54.1 MALIGNANT NEOPLASM OF ENDOMETRIUM: Chronic | ICD-10-CM

## 2023-09-20 DIAGNOSIS — Z98.41 CATARACT EXTRACTION STATUS, RIGHT EYE: Chronic | ICD-10-CM

## 2023-09-20 PROCEDURE — 76700 US EXAM ABDOM COMPLETE: CPT | Mod: 26

## 2023-09-20 PROCEDURE — 76700 US EXAM ABDOM COMPLETE: CPT

## 2023-10-04 ENCOUNTER — APPOINTMENT (OUTPATIENT)
Dept: HEPATOLOGY | Facility: CLINIC | Age: 68
End: 2023-10-04
Payer: MEDICARE

## 2023-10-04 VITALS
TEMPERATURE: 97.7 F | HEART RATE: 88 BPM | HEIGHT: 65 IN | BODY MASS INDEX: 30.82 KG/M2 | WEIGHT: 185 LBS | SYSTOLIC BLOOD PRESSURE: 141 MMHG | OXYGEN SATURATION: 96 % | DIASTOLIC BLOOD PRESSURE: 91 MMHG

## 2023-10-04 PROCEDURE — 99214 OFFICE O/P EST MOD 30 MIN: CPT

## 2023-10-17 ENCOUNTER — APPOINTMENT (OUTPATIENT)
Dept: FAMILY MEDICINE | Facility: CLINIC | Age: 68
End: 2023-10-17
Payer: MEDICARE

## 2023-10-17 ENCOUNTER — RX RENEWAL (OUTPATIENT)
Age: 68
End: 2023-10-17

## 2023-10-17 VITALS
WEIGHT: 188 LBS | SYSTOLIC BLOOD PRESSURE: 136 MMHG | DIASTOLIC BLOOD PRESSURE: 81 MMHG | OXYGEN SATURATION: 96 % | TEMPERATURE: 98.1 F | BODY MASS INDEX: 31.29 KG/M2 | HEART RATE: 107 BPM

## 2023-10-17 DIAGNOSIS — J06.9 ACUTE UPPER RESPIRATORY INFECTION, UNSPECIFIED: ICD-10-CM

## 2023-10-17 DIAGNOSIS — R74.8 ABNORMAL LEVELS OF OTHER SERUM ENZYMES: ICD-10-CM

## 2023-10-17 PROCEDURE — 99213 OFFICE O/P EST LOW 20 MIN: CPT

## 2023-10-24 ENCOUNTER — OFFICE (OUTPATIENT)
Facility: LOCATION | Age: 68
Setting detail: OPHTHALMOLOGY
End: 2023-10-24
Payer: MEDICARE

## 2023-10-24 DIAGNOSIS — E11.9: ICD-10-CM

## 2023-10-24 DIAGNOSIS — H43.813: ICD-10-CM

## 2023-10-24 DIAGNOSIS — H26.493: ICD-10-CM

## 2023-10-24 DIAGNOSIS — Z13.5: ICD-10-CM

## 2023-10-24 PROBLEM — H43.393 VITREOUS FLOATERS; BOTH EYES: Status: ACTIVE | Noted: 2023-10-24

## 2023-10-24 PROBLEM — H35.40 PERIPAPILLARY ATROPHY ; BOTH EYES: Status: ACTIVE | Noted: 2023-10-24

## 2023-10-24 PROCEDURE — 92250 FUNDUS PHOTOGRAPHY W/I&R: CPT | Performed by: OPHTHALMOLOGY

## 2023-10-24 PROCEDURE — 99213 OFFICE O/P EST LOW 20 MIN: CPT | Performed by: OPHTHALMOLOGY

## 2023-10-24 ASSESSMENT — CONFRONTATIONAL VISUAL FIELD TEST (CVF)
OS_FINDINGS: FULL
OD_FINDINGS: FULL

## 2023-10-24 ASSESSMENT — LID EXAM ASSESSMENTS
OS_BLEPHARITIS: LLL LUL
OD_BLEPHARITIS: RLL RUL

## 2023-10-24 ASSESSMENT — TONOMETRY
OD_IOP_MMHG: 13
OS_IOP_MMHG: 13

## 2023-10-30 ASSESSMENT — KERATOMETRY
OD_AXISANGLE_DEGREES: 93
OS_AXISANGLE_DEGREES: 105
OS_K2POWER_DIOPTERS: 45.00
OD_K2POWER_DIOPTERS: 45.00
OD_K1POWER_DIOPTERS: 44.50
OS_K1POWER_DIOPTERS: 44.50

## 2023-10-30 ASSESSMENT — SPHEQUIV_DERIVED
OS_SPHEQUIV: -0.5
OD_SPHEQUIV: 0

## 2023-10-30 ASSESSMENT — VISUAL ACUITY
OS_BCVA: 20/20
OD_BCVA: 20/40

## 2023-10-30 ASSESSMENT — REFRACTION_AUTOREFRACTION
OD_AXIS: 63
OS_CYLINDER: +0.50
OS_AXIS: 97
OD_SPHERE: -0.25
OD_CYLINDER: +0.50
OS_SPHERE: -0.75

## 2023-10-30 ASSESSMENT — AXIALLENGTH_DERIVED
OD_AL: 23.1415
OS_AL: 23.3305

## 2024-01-10 ENCOUNTER — RX RENEWAL (OUTPATIENT)
Age: 69
End: 2024-01-10

## 2024-01-18 NOTE — PHYSICAL EXAM
Neuro- A&Ox4  Most Recent Vitals- Temp: 97.9  F (36.6  C) Temp src: Oral BP: 115/69 Pulse: 83   Resp: 16 SpO2: 94 % O2 Device: Nasal cannula   Tele/Cardiac- Afib CVR  Resp- LS dim with fine crackles at bases  Activity- lift for transfers  Pain- with repositioning  Drips- hep gtt  Drains/Tubes- PIV  Skin- scattered bruising  GI/- branch for retension  Aggression Color- Green  COVID status- Negative  Plan- placement pending  [facility]-     Carlee Castellon, RN Goal Outcome Evaluation:                         [Absent] : Adnexa(ae): Absent [Normal] : Bimanual Exam: Normal [de-identified] : Grade 2 cystocele.  [de-identified] : Andria Quinteros Medical assistant chaperoned during gynecologic exam.

## 2024-02-05 ENCOUNTER — OFFICE (OUTPATIENT)
Facility: LOCATION | Age: 69
Setting detail: OPHTHALMOLOGY
End: 2024-02-05
Payer: MEDICARE

## 2024-02-05 DIAGNOSIS — H43.813: ICD-10-CM

## 2024-02-05 DIAGNOSIS — Z13.5: ICD-10-CM

## 2024-02-05 DIAGNOSIS — H26.493: ICD-10-CM

## 2024-02-05 DIAGNOSIS — E11.9: ICD-10-CM

## 2024-02-05 PROBLEM — H16.223 DRY EYE SYNDROME K SICCA; BOTH EYES: Status: ACTIVE | Noted: 2024-02-05

## 2024-02-05 PROCEDURE — 92250 FUNDUS PHOTOGRAPHY W/I&R: CPT | Performed by: OPHTHALMOLOGY

## 2024-02-05 PROCEDURE — 99213 OFFICE O/P EST LOW 20 MIN: CPT | Performed by: OPHTHALMOLOGY

## 2024-02-05 ASSESSMENT — CONFRONTATIONAL VISUAL FIELD TEST (CVF)
OS_FINDINGS: FULL
OD_FINDINGS: FULL

## 2024-02-05 ASSESSMENT — SUPERFICIAL PUNCTATE KERATITIS (SPK)
OS_SPK: T
OD_SPK: T

## 2024-02-05 ASSESSMENT — LID EXAM ASSESSMENTS
OS_BLEPHARITIS: LLL LUL
OD_BLEPHARITIS: RLL RUL

## 2024-02-07 ASSESSMENT — REFRACTION_AUTOREFRACTION
OS_AXIS: 97
OS_SPHERE: -0.75
OD_AXIS: 63
OS_CYLINDER: +0.50
OD_CYLINDER: +0.50
OD_SPHERE: -0.25

## 2024-02-07 ASSESSMENT — SPHEQUIV_DERIVED
OS_SPHEQUIV: -0.5
OD_SPHEQUIV: 0

## 2024-02-14 ENCOUNTER — RX RENEWAL (OUTPATIENT)
Age: 69
End: 2024-02-14

## 2024-02-23 LAB
AFP-TM SERPL-MCNC: 2.9 NG/ML
ALBUMIN SERPL ELPH-MCNC: 3.6 G/DL
ALP BLD-CCNC: 248 U/L
ALT SERPL-CCNC: 49 U/L
ANION GAP SERPL CALC-SCNC: 14 MMOL/L
AST SERPL-CCNC: 48 U/L
BASOPHILS # BLD AUTO: 0.03 K/UL
BASOPHILS NFR BLD AUTO: 0.3 %
BILIRUB SERPL-MCNC: 0.9 MG/DL
BUN SERPL-MCNC: 30 MG/DL
CALCIUM SERPL-MCNC: 9.3 MG/DL
CHLORIDE SERPL-SCNC: 108 MMOL/L
CO2 SERPL-SCNC: 22 MMOL/L
CREAT SERPL-MCNC: 0.99 MG/DL
EGFR: 63 ML/MIN/1.73M2
EOSINOPHIL # BLD AUTO: 0.15 K/UL
EOSINOPHIL NFR BLD AUTO: 1.4 %
ESTIMATED AVERAGE GLUCOSE: 126 MG/DL
GGT SERPL-CCNC: 338 U/L
GLUCOSE SERPL-MCNC: 63 MG/DL
HBA1C MFR BLD HPLC: 6 %
HCT VFR BLD CALC: 43.9 %
HGB BLD-MCNC: 13.8 G/DL
IMM GRANULOCYTES NFR BLD AUTO: 0.2 %
LYMPHOCYTES # BLD AUTO: 2.65 K/UL
LYMPHOCYTES NFR BLD AUTO: 25.3 %
MAN DIFF?: NORMAL
MCHC RBC-ENTMCNC: 29.7 PG
MCHC RBC-ENTMCNC: 31.4 GM/DL
MCV RBC AUTO: 94.6 FL
MONOCYTES # BLD AUTO: 0.82 K/UL
MONOCYTES NFR BLD AUTO: 7.8 %
NEUTROPHILS # BLD AUTO: 6.79 K/UL
NEUTROPHILS NFR BLD AUTO: 65 %
PLATELET # BLD AUTO: 260 K/UL
POTASSIUM SERPL-SCNC: 4.9 MMOL/L
PROT SERPL-MCNC: 6.4 G/DL
RBC # BLD: 4.64 M/UL
RBC # FLD: 15.7 %
SODIUM SERPL-SCNC: 144 MMOL/L
TACROLIMUS SERPL-MCNC: 8.5 NG/ML
WBC # FLD AUTO: 10.46 K/UL

## 2024-02-28 RX ORDER — TACROLIMUS 1 MG/1
1 CAPSULE ORAL
Qty: 540 | Refills: 3 | Status: ACTIVE | COMMUNITY
Start: 2022-12-26 | End: 1900-01-01

## 2024-02-28 RX ORDER — PREDNISONE 5 MG/1
5 TABLET ORAL
Qty: 180 | Refills: 3 | Status: ACTIVE | COMMUNITY
Start: 2018-02-07 | End: 1900-01-01

## 2024-02-28 RX ORDER — TACROLIMUS 0.5 MG/1
0.5 CAPSULE ORAL
Qty: 60 | Refills: 11 | Status: ACTIVE | COMMUNITY
Start: 2020-02-28 | End: 1900-01-01

## 2024-03-04 ENCOUNTER — OFFICE (OUTPATIENT)
Facility: LOCATION | Age: 69
Setting detail: OPHTHALMOLOGY
End: 2024-03-04
Payer: MEDICARE

## 2024-03-04 DIAGNOSIS — H26.492: ICD-10-CM

## 2024-03-04 PROCEDURE — 66821 AFTER CATARACT LASER SURGERY: CPT | Mod: LT | Performed by: OPHTHALMOLOGY

## 2024-03-04 ASSESSMENT — LID EXAM ASSESSMENTS
OD_BLEPHARITIS: RLL RUL
OS_BLEPHARITIS: LLL LUL

## 2024-03-05 NOTE — PHYSICAL EXAM
[Normocephalic] : normocephalic [Atraumatic] : atraumatic [Sclera nonicteric] : sclera nonicteric [Supple] : supple [No Supraclavicular Adenopathy] : no supraclavicular adenopathy [No Cervical Adenopathy] : no cervical adenopathy [Clear to Auscultation Bilat] : clear to auscultation bilaterally [Normal Sinus Rhythm] : normal sinus rhythm [Examined in the supine and seated position] : examined in the supine and seated position [Bra Size: ___] : Bra Size: [unfilled] [No dominant masses] : no dominant masses in right breast  [No dominant masses] : no dominant masses left breast [No Nipple Retraction] : no left nipple retraction [No Nipple Discharge] : no left nipple discharge [No Axillary Lymphadenopathy] : no left axillary lymphadenopathy [No Edema] : no edema [No Rashes] : no rashes [No Ulceration] : no ulceration

## 2024-03-06 ENCOUNTER — APPOINTMENT (OUTPATIENT)
Dept: BREAST CENTER | Facility: CLINIC | Age: 69
End: 2024-03-06
Payer: MEDICARE

## 2024-03-06 ENCOUNTER — RX ONLY (RX ONLY)
Age: 69
End: 2024-03-06

## 2024-03-06 VITALS
SYSTOLIC BLOOD PRESSURE: 153 MMHG | HEART RATE: 115 BPM | BODY MASS INDEX: 27.99 KG/M2 | DIASTOLIC BLOOD PRESSURE: 100 MMHG | HEIGHT: 65 IN | WEIGHT: 168 LBS

## 2024-03-06 PROCEDURE — 99215 OFFICE O/P EST HI 40 MIN: CPT

## 2024-03-06 RX ORDER — ALBUTEROL SULFATE 90 UG/1
108 (90 BASE) INHALANT RESPIRATORY (INHALATION)
Qty: 1 | Refills: 0 | Status: DISCONTINUED | COMMUNITY
Start: 2023-10-17 | End: 2024-03-06

## 2024-03-06 RX ORDER — BLOOD SUGAR DIAGNOSTIC
STRIP MISCELLANEOUS 4 TIMES DAILY
Qty: 300 | Refills: 0 | Status: DISCONTINUED | COMMUNITY
Start: 2020-12-10 | End: 2024-03-06

## 2024-03-06 RX ORDER — FLUTICASONE PROPIONATE 50 UG/1
50 SPRAY, METERED NASAL
Qty: 48 | Refills: 0 | Status: DISCONTINUED | COMMUNITY
Start: 2023-10-17 | End: 2024-03-06

## 2024-03-06 RX ORDER — AZITHROMYCIN 250 MG/1
250 TABLET, FILM COATED ORAL
Qty: 1 | Refills: 0 | Status: DISCONTINUED | COMMUNITY
Start: 2023-10-17 | End: 2024-03-06

## 2024-03-06 RX ORDER — BENZONATATE 100 MG/1
100 CAPSULE ORAL 3 TIMES DAILY
Qty: 30 | Refills: 0 | Status: DISCONTINUED | COMMUNITY
Start: 2023-10-17 | End: 2024-03-06

## 2024-03-06 NOTE — HISTORY OF PRESENT ILLNESS
[FreeTextEntry1] : Ms. Crisostomo is a 68 year old woman who presents for a follow-up for right breast papilloma with LCIS. She is asymptomatic. No palpable breast or axillary lumps, nipple discharge, or skin changes. Her breast surgery in August was previously cancelled due to uncontrolled diabetes with a HbA1C of 10.6. She has since been treated with insulin and ozempic, and the recent HbA1C is 6.0.   Her medical history includes liver transplant on immunosuppressants, frequent UTI's and multiple infections after joint surgeries, and atrial fibrillation on eliquis.  Her family history is significant for breast cancer in a paternal aunt in her late 40's, the paternal grandmother in her 50's, two of her father's cousins (PGF's sister's daughters; one in her 30's), the maternal grandmother in her early 60's, and four maternal great-aunts (MGM's sisters) in their 60's.

## 2024-03-06 NOTE — ASSESSMENT
[FreeTextEntry1] : Ms. Crisostomo is a 68 year old woman with a right breast papilloma with LCIS. Her breast exam today is without suspicious findings. A bilateral mammogram and US are ordered for a follow-up of the right breast papilloma with LCIS, and as it is almost time for her annual screening, this would be to evaluate both breasts prior to surgery. We again reviewed a right breast excisional biopsy with Magseed localization. Ms. Crisostomo once woke up during the procedure of loop recorder placement, and wants to ensure that she will not wake up during the procedure. We also discussed that if the imaging shows that papilloma with LCIS has been stable for 10 months, the likelihood of an associated malignancy will be low, and it is not unreasonable to consider continued observation of the area. This would then avoid the risks of surgery and anesthesia, given her medical problems. To address the increased risk of breast cancer in the future, she would need to see the Medical Oncologist regarding anti-hormonal medication, and this would be recommended whether or not she has the surgical excision. Ms. Crisostomo will think about it. I will call [facility] to finalize the plan of management. She understands and is comfortable with the plan. She is encouraged to call if any questions or concerns arise.

## 2024-03-13 ENCOUNTER — APPOINTMENT (OUTPATIENT)
Dept: MAMMOGRAPHY | Facility: CLINIC | Age: 69
End: 2024-03-13
Payer: MEDICARE

## 2024-03-13 ENCOUNTER — RESULT REVIEW (OUTPATIENT)
Age: 69
End: 2024-03-13

## 2024-03-13 ENCOUNTER — APPOINTMENT (OUTPATIENT)
Dept: ULTRASOUND IMAGING | Facility: CLINIC | Age: 69
End: 2024-03-13
Payer: MEDICARE

## 2024-03-13 ENCOUNTER — OUTPATIENT (OUTPATIENT)
Dept: OUTPATIENT SERVICES | Facility: HOSPITAL | Age: 69
LOS: 1 days | End: 2024-03-13
Payer: MEDICARE

## 2024-03-13 DIAGNOSIS — Z98.890 OTHER SPECIFIED POSTPROCEDURAL STATES: Chronic | ICD-10-CM

## 2024-03-13 DIAGNOSIS — Z94.4 LIVER TRANSPLANT STATUS: Chronic | ICD-10-CM

## 2024-03-13 DIAGNOSIS — M11.20 OTHER CHONDROCALCINOSIS, UNSPECIFIED SITE: Chronic | ICD-10-CM

## 2024-03-13 DIAGNOSIS — Z87.798 PERSONAL HISTORY OF OTHER (CORRECTED) CONGENITAL MALFORMATIONS: Chronic | ICD-10-CM

## 2024-03-13 DIAGNOSIS — D05.01 LOBULAR CARCINOMA IN SITU OF RIGHT BREAST: ICD-10-CM

## 2024-03-13 DIAGNOSIS — Z95.828 PRESENCE OF OTHER VASCULAR IMPLANTS AND GRAFTS: Chronic | ICD-10-CM

## 2024-03-13 DIAGNOSIS — Z98.41 CATARACT EXTRACTION STATUS, RIGHT EYE: Chronic | ICD-10-CM

## 2024-03-13 DIAGNOSIS — D24.1 BENIGN NEOPLASM OF RIGHT BREAST: ICD-10-CM

## 2024-03-13 DIAGNOSIS — C54.1 MALIGNANT NEOPLASM OF ENDOMETRIUM: Chronic | ICD-10-CM

## 2024-03-13 PROCEDURE — 77066 DX MAMMO INCL CAD BI: CPT

## 2024-03-13 PROCEDURE — 76641 ULTRASOUND BREAST COMPLETE: CPT | Mod: 26,50,3G,GY

## 2024-03-13 PROCEDURE — G0279: CPT | Mod: 26

## 2024-03-13 PROCEDURE — 77066 DX MAMMO INCL CAD BI: CPT | Mod: 26

## 2024-03-13 PROCEDURE — 76641 ULTRASOUND BREAST COMPLETE: CPT

## 2024-03-13 PROCEDURE — G0279: CPT

## 2024-03-14 ENCOUNTER — NON-APPOINTMENT (OUTPATIENT)
Age: 69
End: 2024-03-14

## 2024-03-22 ENCOUNTER — NON-APPOINTMENT (OUTPATIENT)
Age: 69
End: 2024-03-22

## 2024-04-02 ENCOUNTER — APPOINTMENT (OUTPATIENT)
Dept: CARDIOLOGY | Facility: CLINIC | Age: 69
End: 2024-04-02
Payer: MEDICARE

## 2024-04-02 ENCOUNTER — NON-APPOINTMENT (OUTPATIENT)
Age: 69
End: 2024-04-02

## 2024-04-02 VITALS
DIASTOLIC BLOOD PRESSURE: 83 MMHG | RESPIRATION RATE: 17 BRPM | WEIGHT: 161 LBS | HEIGHT: 65 IN | SYSTOLIC BLOOD PRESSURE: 123 MMHG | HEART RATE: 111 BPM | BODY MASS INDEX: 26.82 KG/M2 | OXYGEN SATURATION: 96 %

## 2024-04-02 DIAGNOSIS — I48.91 UNSPECIFIED ATRIAL FIBRILLATION: ICD-10-CM

## 2024-04-02 DIAGNOSIS — R60.0 LOCALIZED EDEMA: ICD-10-CM

## 2024-04-02 DIAGNOSIS — Z01.810 ENCOUNTER FOR PREPROCEDURAL CARDIOVASCULAR EXAMINATION: ICD-10-CM

## 2024-04-02 DIAGNOSIS — Z79.01 LONG TERM (CURRENT) USE OF ANTICOAGULANTS: ICD-10-CM

## 2024-04-02 PROCEDURE — G2211 COMPLEX E/M VISIT ADD ON: CPT

## 2024-04-02 PROCEDURE — 93000 ELECTROCARDIOGRAM COMPLETE: CPT | Mod: NC

## 2024-04-02 PROCEDURE — 99215 OFFICE O/P EST HI 40 MIN: CPT

## 2024-04-02 RX ORDER — SEMAGLUTIDE 1.34 MG/ML
2 INJECTION, SOLUTION SUBCUTANEOUS
Refills: 0 | Status: ACTIVE | COMMUNITY

## 2024-04-02 NOTE — DISCUSSION/SUMMARY
[EKG obtained to assist in diagnosis and management of assessed problem(s)] : EKG obtained to assist in diagnosis and management of assessed problem(s) [FreeTextEntry1] : Patient is a 68 year-old liver transplant recipient with cardiovascular history as above including a history of heart failure with reduced ejection fraction, chronic LE edema, atrial fibrillation on anticoagulation, and DVT (after trauma) status post IVC filter.  In the fall of 2020, patient was taking metoprolol succinate 50 mg BID and Entresto  mg BID, in addition to furosemide. She continues taking furosemide and metoprolol, but the Entresto was discontinued due to nonspecific complaints prior to hospitalizations for symptoms of unclear etiology, e.g. diarrhea, flank pain, and muscle spasms.   No changes to her medications were suggested today.  She is without acute coronary syndrome, decompensated heart failure, dangerous arrhythmia, or critical valvular stenosis. No further cardiovascular testing is necessary prior to proceeding with scheduled breast surgery. She may stop her Eliquis 72 hours prior to the breast surgery and resume when cleared by Dr. Hill. Continue beta-blocker perioperatively. Can escalate dosing for rate control as needed.

## 2024-04-02 NOTE — PHYSICAL EXAM
[General Appearance - In No Acute Distress] : no acute distress [Normal Oral Mucosa] : normal oral mucosa [No Oral Cyanosis] : no oral cyanosis [No Oral Pallor] : no oral pallor [Normal Oropharynx] : normal oropharynx [Normal Jugular Venous V Waves Present] : normal jugular venous V waves present [] : no respiratory distress [Respiration, Rhythm And Depth] : normal respiratory rhythm and effort [Exaggerated Use Of Accessory Muscles For Inspiration] : no accessory muscle use [Auscultation Breath Sounds / Voice Sounds] : lungs were clear to auscultation bilaterally [Bowel Sounds] : normal bowel sounds [Abdomen Soft] : soft [Abdomen Tenderness] : non-tender [Cyanosis, Localized] : no localized cyanosis [Nail Clubbing] : no clubbing of the fingernails [No Xanthoma] : no  xanthoma was observed [Skin Color & Pigmentation] : normal skin color and pigmentation [Oriented To Time, Place, And Person] : oriented to person, place, and time [Impaired Insight] : insight and judgment were intact [Mood] : the mood was normal [Affect] : the affect was normal [No Anxiety] : not feeling anxious [Normal] : the eyelids were normal bilaterally [Conjunctiva] : the conjunctiva were normal in both eyes [PERRL] : pupils were equal in size, round, and reactive to light [EOM Intact] : extraocular movements were intact [Not Palpable] : not palpable [Tachycardia] : tachycardic [Irregularly Irregular] : irregularly irregular [Normal S1] : normal S1 [No Murmur] : no murmurs heard [Normal S2] : normal S2 [Anasarca] : anasarca edema present [FreeTextEntry1] : ambulates with cane for assist [Yellow Sclera (Icteric)] : no scleral icterus was seen

## 2024-04-02 NOTE — CARDIOLOGY SUMMARY
[de-identified] : 8/28/2020, AFib with frequent PVCs\par  8/27/2021. AFib. Unchanged from prior [de-identified] : 9/1/2020, moderate LV dysfunction, enlarged LA size \par  5/5/2023, severely dilated LA, mild-moderate MR, regional wall motion abnormalities - inferior and inferoseptal hypokinesis with basal-mid inferior akinesis, eccentric LVH - overall mildly reduced LVEF at 50-55%

## 2024-04-02 NOTE — REASON FOR VISIT
[Symptom and Test Evaluation] : symptom and test evaluation [Cardiac Failure] : cardiac failure [Arrhythmia/ECG Abnorrmalities] : arrhythmia/ECG abnormalities [FreeTextEntry3] : Kezia Foster MD PhD [FreeTextEntry1] : April 2024 - Patient returns today for follow-up and preop cardiovascular evaluation prior to right breast surgery that is scheduled for 4/24/2024 with Dr. Parisa Hill.  She has no new cardiovascular complaints. She has lost considerable weight since starting Ozempic. She reports just no longer feeling hungry.  She has difficulty taking stairs, but not because of shortness of breath, just because of muscle strength. She walks with a rolling walker for exercise.

## 2024-04-02 NOTE — HISTORY OF PRESENT ILLNESS
[FreeTextEntry1] : Patient is a 68 year-old woman known history of acute autoimmune hepatitis at age 30 with cirrhosis, status post liver transplant at [facility] in 2004, known cardiovascular risk factors of morbid obesity, DVT status post IVC filter, atrial fibrillation status post DCCV and catheter ablations, maintained on anticoagulation with apixaban, history of heart failure with reduced ejection fraction, LVEF 46% on TTE in January 2018, maintained on beta-blocker and loop diuretic, but not on ACEi/ARB/ARNI, now presents for cardiovascular evaluation prior to hysterectomy after a biopsy showed endometrial adenocarcinoma.  Patient sleeps with one pillow at night. She does not have paroxysmal nocturnal dyspnea. Chronic lower extremity edema vs. lymphedema.  September 2020 - Status post total hysterectomy, BSO, cystoscopy on 9/8/2020. She was discharged from [facility] on 9/9/2020. She transiently increased her steroids but then tapered back down. She has tolerated her Entresto 24-26 mg BID well.  November 2020 - Patient returns today for follow-up. She has been tolerating Entresto 49-51 mg BID, with frequent TID, but some days she decides against the third dose because of symptoms that she associates with her surgery and radiation therapy, including diarrhea.  May 2021 - Patient returns today for follow-up. Since her last visit here, she has been hospitalized several times for various symptoms, e.g. diarrhea, back pain, spasms, flank pain, but no etiology is seen to be found. Patient lives with her son and daughter-in-law who are in the process of moving. She is dealing with a lot of stress.   August 2021 - Ms. Crisostomo returns today for preoperative cardiovascular evaluation prior to a planned endoscopy/colonoscopy with Dr. Bangura at [facility] on 9/3/2021.  Since her last visit she has been feeling well in general, but she does fatigue easily.  She denies any chest pain or exertional shortness of breath.  Her medications were reviewed and she is taking all as directed.   April 2023 - Patient returns today for follow-up more than a year since her last visit. She remains on Eliqiuis 5 mg BID for anticoagulation. She remains on metoprolol succinate 50 mg BID for rate control.  Since her last visit here, she has had Covid-19 several times, which she believes is affecting her memory. She was also hospitalized several times for gout and associated infections.  She has lost more than 20 lbs by emphasizing a plant based diet and maintaining her diuretic regimen. She is scheduled for cataract surgeries with Jaden Michel MD, on 5/25/2023 and 6/8/2023.  May 2023 -  TeleHealth Video Encounter Initiated by: Patient election for TeleHealth visit Patient was consented for TeleHealth visit Patient Location: Home Physician Location: Office ([address] [place] [address], [address], [place], [place], [address]) Duration of Encounter: 30 minutes, at least 50% of which was spent in direct counseling and coordination of care.   She is scheduled for cataract surgeries with Jaden Michel MD, on 5/25/2023 and 6/8/2023. There have been no changes in her health since she was here in April. Echocardiogram performed 5/5/2023 was unchanged from 2020 study, showing mildly reduced LVEF with regional wall motion abnormalities. She has also been seen and evaluated by her primary care physician. She will see Dr. Foster on 5/19/2023.  PMD: Silvia Nash DO and Nayan Lee MD [phone] EP: Doron Byrd MD [phone] GI: Hoa Bangura MD [phone] Hepatologist: Kezia Foster MD [phone] Gyn: Lubna Kennedy MD [phone] Gyn-Onc: Rogers Paredes MD [phone]

## 2024-04-08 ENCOUNTER — OUTPATIENT (OUTPATIENT)
Dept: OUTPATIENT SERVICES | Facility: HOSPITAL | Age: 69
LOS: 1 days | End: 2024-04-08
Payer: MEDICARE

## 2024-04-08 DIAGNOSIS — Z01.818 ENCOUNTER FOR OTHER PREPROCEDURAL EXAMINATION: ICD-10-CM

## 2024-04-08 DIAGNOSIS — Z98.890 OTHER SPECIFIED POSTPROCEDURAL STATES: Chronic | ICD-10-CM

## 2024-04-08 DIAGNOSIS — M11.20 OTHER CHONDROCALCINOSIS, UNSPECIFIED SITE: Chronic | ICD-10-CM

## 2024-04-08 DIAGNOSIS — Z87.798 PERSONAL HISTORY OF OTHER (CORRECTED) CONGENITAL MALFORMATIONS: Chronic | ICD-10-CM

## 2024-04-08 DIAGNOSIS — C54.1 MALIGNANT NEOPLASM OF ENDOMETRIUM: Chronic | ICD-10-CM

## 2024-04-08 DIAGNOSIS — Z95.828 PRESENCE OF OTHER VASCULAR IMPLANTS AND GRAFTS: Chronic | ICD-10-CM

## 2024-04-08 DIAGNOSIS — Z95.818 PRESENCE OF OTHER CARDIAC IMPLANTS AND GRAFTS: Chronic | ICD-10-CM

## 2024-04-08 DIAGNOSIS — Z94.4 LIVER TRANSPLANT STATUS: Chronic | ICD-10-CM

## 2024-04-08 DIAGNOSIS — Z98.41 CATARACT EXTRACTION STATUS, RIGHT EYE: Chronic | ICD-10-CM

## 2024-04-08 DIAGNOSIS — D24.1 BENIGN NEOPLASM OF RIGHT BREAST: ICD-10-CM

## 2024-04-08 LAB
A1C WITH ESTIMATED AVERAGE GLUCOSE RESULT: 6.2 % — HIGH (ref 4–5.6)
ALBUMIN SERPL ELPH-MCNC: 3 G/DL — LOW (ref 3.3–5)
ALP SERPL-CCNC: 186 U/L — HIGH (ref 40–120)
ALT FLD-CCNC: 43 U/L — SIGNIFICANT CHANGE UP (ref 12–78)
ANION GAP SERPL CALC-SCNC: 7 MMOL/L — SIGNIFICANT CHANGE UP (ref 5–17)
APTT BLD: 28.9 SEC — SIGNIFICANT CHANGE UP (ref 24.5–35.6)
AST SERPL-CCNC: 34 U/L — SIGNIFICANT CHANGE UP (ref 15–37)
BASOPHILS # BLD AUTO: 0.04 K/UL — SIGNIFICANT CHANGE UP (ref 0–0.2)
BASOPHILS NFR BLD AUTO: 0.4 % — SIGNIFICANT CHANGE UP (ref 0–2)
BILIRUB DIRECT SERPL-MCNC: 0.4 MG/DL — HIGH (ref 0–0.3)
BILIRUB SERPL-MCNC: 1.1 MG/DL — SIGNIFICANT CHANGE UP (ref 0.2–1.2)
BLD GP AB SCN SERPL QL: SIGNIFICANT CHANGE UP
BUN SERPL-MCNC: 37 MG/DL — HIGH (ref 7–23)
CALCIUM SERPL-MCNC: 9.5 MG/DL — SIGNIFICANT CHANGE UP (ref 8.5–10.1)
CHLORIDE SERPL-SCNC: 105 MMOL/L — SIGNIFICANT CHANGE UP (ref 96–108)
CO2 SERPL-SCNC: 24 MMOL/L — SIGNIFICANT CHANGE UP (ref 22–31)
CREAT SERPL-MCNC: 1.22 MG/DL — SIGNIFICANT CHANGE UP (ref 0.5–1.3)
EGFR: 49 ML/MIN/1.73M2 — LOW
EOSINOPHIL # BLD AUTO: 0.12 K/UL — SIGNIFICANT CHANGE UP (ref 0–0.5)
EOSINOPHIL NFR BLD AUTO: 1.1 % — SIGNIFICANT CHANGE UP (ref 0–6)
ESTIMATED AVERAGE GLUCOSE: 131 MG/DL — HIGH (ref 68–114)
GLUCOSE SERPL-MCNC: 127 MG/DL — HIGH (ref 70–99)
HCT VFR BLD CALC: 45 % — SIGNIFICANT CHANGE UP (ref 34.5–45)
HGB BLD-MCNC: 14.7 G/DL — SIGNIFICANT CHANGE UP (ref 11.5–15.5)
IMM GRANULOCYTES NFR BLD AUTO: 0.4 % — SIGNIFICANT CHANGE UP (ref 0–0.9)
INR BLD: 1.07 RATIO — SIGNIFICANT CHANGE UP (ref 0.85–1.18)
LYMPHOCYTES # BLD AUTO: 2.31 K/UL — SIGNIFICANT CHANGE UP (ref 1–3.3)
LYMPHOCYTES # BLD AUTO: 20.8 % — SIGNIFICANT CHANGE UP (ref 13–44)
MCHC RBC-ENTMCNC: 29.8 PG — SIGNIFICANT CHANGE UP (ref 27–34)
MCHC RBC-ENTMCNC: 32.7 GM/DL — SIGNIFICANT CHANGE UP (ref 32–36)
MCV RBC AUTO: 91.3 FL — SIGNIFICANT CHANGE UP (ref 80–100)
MONOCYTES # BLD AUTO: 0.81 K/UL — SIGNIFICANT CHANGE UP (ref 0–0.9)
MONOCYTES NFR BLD AUTO: 7.3 % — SIGNIFICANT CHANGE UP (ref 2–14)
NEUTROPHILS # BLD AUTO: 7.78 K/UL — HIGH (ref 1.8–7.4)
NEUTROPHILS NFR BLD AUTO: 70 % — SIGNIFICANT CHANGE UP (ref 43–77)
PLATELET # BLD AUTO: 298 K/UL — SIGNIFICANT CHANGE UP (ref 150–400)
POTASSIUM SERPL-MCNC: 4.2 MMOL/L — SIGNIFICANT CHANGE UP (ref 3.5–5.3)
POTASSIUM SERPL-SCNC: 4.2 MMOL/L — SIGNIFICANT CHANGE UP (ref 3.5–5.3)
PROT SERPL-MCNC: 6.6 GM/DL — SIGNIFICANT CHANGE UP (ref 6–8.3)
PROTHROM AB SERPL-ACNC: 12.1 SEC — SIGNIFICANT CHANGE UP (ref 9.5–13)
RBC # BLD: 4.93 M/UL — SIGNIFICANT CHANGE UP (ref 3.8–5.2)
RBC # FLD: 14.8 % — HIGH (ref 10.3–14.5)
SODIUM SERPL-SCNC: 136 MMOL/L — SIGNIFICANT CHANGE UP (ref 135–145)
WBC # BLD: 11.1 K/UL — HIGH (ref 3.8–10.5)
WBC # FLD AUTO: 11.1 K/UL — HIGH (ref 3.8–10.5)

## 2024-04-08 PROCEDURE — 86901 BLOOD TYPING SEROLOGIC RH(D): CPT

## 2024-04-08 PROCEDURE — 93010 ELECTROCARDIOGRAM REPORT: CPT

## 2024-04-08 PROCEDURE — 82248 BILIRUBIN DIRECT: CPT

## 2024-04-08 PROCEDURE — 36415 COLL VENOUS BLD VENIPUNCTURE: CPT

## 2024-04-08 PROCEDURE — 93005 ELECTROCARDIOGRAM TRACING: CPT

## 2024-04-08 PROCEDURE — 85025 COMPLETE CBC W/AUTO DIFF WBC: CPT

## 2024-04-08 PROCEDURE — 80053 COMPREHEN METABOLIC PANEL: CPT

## 2024-04-08 PROCEDURE — 86900 BLOOD TYPING SEROLOGIC ABO: CPT

## 2024-04-08 PROCEDURE — 85610 PROTHROMBIN TIME: CPT

## 2024-04-08 PROCEDURE — 86850 RBC ANTIBODY SCREEN: CPT

## 2024-04-08 PROCEDURE — 83036 HEMOGLOBIN GLYCOSYLATED A1C: CPT

## 2024-04-08 PROCEDURE — 85730 THROMBOPLASTIN TIME PARTIAL: CPT

## 2024-04-08 NOTE — ASU PATIENT PROFILE, ADULT - NSICDXPASTMEDICALHX_GEN_ALL_CORE_FT
PAST MEDICAL HISTORY:  Afib     Anemia     Autoimmune hepatitis diagnosed age 29    Bilateral cataracts     Breast mass, right     Colon polyp and duodenal and stomach polyp    DMII (diabetes mellitus, type 2)     DVT (deep venous thrombosis) LLE    Endometrial cancer September 2020    Enlarged thyroid     H/O esophageal varices     History of pseudogout     HTN (hypertension)     Implantable loop recorder present     Intraductal papilloma     Liver disease had liver transplant in 2004    Lobular carcinoma in situ (LCIS) of right breast     Lymphedema of leg ambulates with walkder    Migraines     Pain esophageal banding due to esophageal varices    Scoliosis     Squamous cell skin cancer neck face arms

## 2024-04-08 NOTE — CHART NOTE - NSCHARTNOTEFT_GEN_A_CORE
Plan  1. Stop all NSAIDS, herbal supplements and vitamins for 7 days.  2. NPO as per ASU instructions.  3. Take the following medications ( Prednisone, Tacrolimus, Metoprolol, Omeprazole ) with small sips of water on the morning of your procedure/surgery.  4. Use EZ sponges as directed  5. Labs, EKG as per surgeon.   6. PMD and Cardiologist visits for optimization prior to surgery as per surgeon  7. Hold Ozempic 1 week before surgery  8. Eliquis preop instructions as per cardiologist.

## 2024-04-09 DIAGNOSIS — Z01.818 ENCOUNTER FOR OTHER PREPROCEDURAL EXAMINATION: ICD-10-CM

## 2024-04-09 DIAGNOSIS — D24.1 BENIGN NEOPLASM OF RIGHT BREAST: ICD-10-CM

## 2024-04-10 ENCOUNTER — APPOINTMENT (OUTPATIENT)
Dept: FAMILY MEDICINE | Facility: CLINIC | Age: 69
End: 2024-04-10
Payer: MEDICARE

## 2024-04-10 VITALS
SYSTOLIC BLOOD PRESSURE: 116 MMHG | BODY MASS INDEX: 26.79 KG/M2 | OXYGEN SATURATION: 97 % | TEMPERATURE: 97.1 F | HEART RATE: 89 BPM | DIASTOLIC BLOOD PRESSURE: 70 MMHG | WEIGHT: 161 LBS

## 2024-04-10 DIAGNOSIS — Z01.818 ENCOUNTER FOR OTHER PREPROCEDURAL EXAMINATION: ICD-10-CM

## 2024-04-10 DIAGNOSIS — N64.9 DISORDER OF BREAST, UNSPECIFIED: ICD-10-CM

## 2024-04-10 PROCEDURE — 99214 OFFICE O/P EST MOD 30 MIN: CPT

## 2024-04-10 RX ORDER — INSULIN ASPART 100 [IU]/ML
100 INJECTION, SOLUTION INTRAVENOUS; SUBCUTANEOUS
Refills: 1 | Status: DISCONTINUED | COMMUNITY
End: 2024-04-10

## 2024-04-10 NOTE — ASSESSMENT
"    2/3/2022         RE: Abhi Siddiqui  [address]  [place] [address]        Dear Colleague,    Thank you for referring your patient, Abhi Siddiqui, to the [facility]. Please see a copy of my visit note below.    Chief Complaint(s) and History of Present Illness(es)     Strabismus Follow Up     Course: stable    Associated symptoms: Negative for droopy eyelid, head tilt and eye pain    Treatments tried: surgery              Comments     Mom sees drifting mainly when tired. She is still noting him pointing to objects \"off\" center. She has adapted now and can sort of tell what he is pointing at. No squinting, no AHP.     Will have work up for autism soon.     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 Mom was on Prozac & Ritalin in pregnancy.    Emergency  for cord strangulation.    Congenital nystagmus - very fine, mild, rotary    This may all be idiopathic, related to cord/hypoxia, or what I suspect may be Optic nerve hypoplasia of both eyes though MRI was reassuring.      s/p BLR 9 + BIOA 3 /  (19)   s/p BMx 6 (12/10/19)    Stable excellent vision and eye alignment. Monitor. Reassured Mom on her experiences at home.     Developmental delays, sensory issues, and undergoing work-up for autism spectrum disorder.        Return in about 6 months (around 8/3/2022) for SME, DFE & CRx.    There are no Patient Instructions on file for this visit.    Visit Diagnoses & Orders    ICD-10-CM    1. 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 Congenital nystagmus  H55.01    4. Intermittent monocular exotropia of right eye  H50.331     " [Patient Optimized for Surgery] : Patient optimized for surgery   Attending Physician Attestation:  Complete documentation of historical and exam elements from today's encounter can be found in the full encounter summary report (not reduplicated in this progress note).  I personally obtained the chief complaint(s) and history of present illness.  I confirmed and edited as necessary the review of systems, past medical/surgical history, family history, social history, and examination findings as documented by others; and I examined the patient myself.  I personally reviewed the relevant tests, images, and reports as documented above.  I formulated and edited as necessary the assessment and plan and discussed the findings and management plan with the patient and family. - Laurent Yun Jr., MD       Again, thank you for allowing me to participate in the care of your patient.        Sincerely,        Laurent Yun MD     [As per surgery] : as per surgery [FreeTextEntry4] : 67 yo f with acute autoimmune hepatitis with cirrhosis s/p liver transplant , DVT status post IVC filter, atrial fibrillation status post DCCV and catheter ablations, maintained on anticoagulation, history of heart failure, diabetes, here for clearance for breast papilloma removal

## 2024-04-10 NOTE — HISTORY OF PRESENT ILLNESS
[Atrial Fibrillation] : atrial fibrillation [No Pertinent Pulmonary History] : no history of asthma, COPD, sleep apnea, or smoking [No Adverse Anesthesia Reaction] : no adverse anesthesia reaction in self or family member [Chronic Anticoagulation] : chronic anticoagulation [Diabetes] : diabetes [(Patient denies any chest pain, claudication, dyspnea on exertion, orthopnea, palpitations or syncope)] : Patient denies any chest pain, claudication, dyspnea on exertion, orthopnea, palpitations or syncope [Aortic Stenosis] : no aortic stenosis [Coronary Artery Disease] : no coronary artery disease [Recent Myocardial Infarction] : no recent myocardial infarction [Implantable Device/Pacemaker] : no implantable device/pacemaker [Chronic Kidney Disease] : no chronic kidney disease [FreeTextEntry1] : papiloma removal from right brest [FreeTextEntry2] : 04/24/24 [FreeTextEntry3] : DR. MORENO [FreeTextEntry4] : PT he a medical clearance. no chest pain, no sob, no cough, no fever, no dizziness, no abdominal pain, no n/v/d/c/melena/brbpr/hematuria/dysuria [FreeTextEntry6] :  loop recorder in place per pt

## 2024-04-10 NOTE — PHYSICAL EXAM
[No Acute Distress] : no acute distress [Well Nourished] : well nourished [Well Developed] : well developed [Well-Appearing] : well-appearing [Normal Voice/Communication] : normal voice/communication [Normal Sclera/Conjunctiva] : normal sclera/conjunctiva [PERRL] : pupils equal round and reactive to light [EOMI] : extraocular movements intact [Normal Outer Ear/Nose] : the outer ears and nose were normal in appearance [Normal Oropharynx] : the oropharynx was normal [Normal TMs] : both tympanic membranes were normal [No JVD] : no jugular venous distention [No Lymphadenopathy] : no lymphadenopathy [Supple] : supple [Thyroid Normal, No Nodules] : the thyroid was normal and there were no nodules present [No Respiratory Distress] : no respiratory distress  [No Accessory Muscle Use] : no accessory muscle use [Clear to Auscultation] : lungs were clear to auscultation bilaterally [Normal Rate] : normal rate  [Regular Rhythm] : with a regular rhythm [Normal S1, S2] : normal S1 and S2 [No Murmur] : no murmur heard [No Carotid Bruits] : no carotid bruits [No Abdominal Bruit] : a ~M bruit was not heard ~T in the abdomen [No Varicosities] : no varicosities [Pedal Pulses Present] : the pedal pulses are present [No Edema] : there was no peripheral edema [No Palpable Aorta] : no palpable aorta [No Extremity Clubbing/Cyanosis] : no extremity clubbing/cyanosis [Soft] : abdomen soft [Non Tender] : non-tender [Non-distended] : non-distended [No Masses] : no abdominal mass palpated [No HSM] : no HSM [Normal Bowel Sounds] : normal bowel sounds [Normal Posterior Cervical Nodes] : no posterior cervical lymphadenopathy [Normal Anterior Cervical Nodes] : no anterior cervical lymphadenopathy [No CVA Tenderness] : no CVA  tenderness [No Spinal Tenderness] : no spinal tenderness [No Joint Swelling] : no joint swelling [Grossly Normal Strength/Tone] : grossly normal strength/tone [No Rash] : no rash [Coordination Grossly Intact] : coordination grossly intact [No Focal Deficits] : no focal deficits [Speech Grossly Normal] : speech grossly normal [Normal Affect] : the affect was normal [Alert and Oriented x3] : oriented to person, place, and time [Normal Mood] : the mood was normal [Normal Insight/Judgement] : insight and judgment were intact [de-identified] : walks with walker

## 2024-04-11 ENCOUNTER — APPOINTMENT (OUTPATIENT)
Dept: HEPATOLOGY | Facility: CLINIC | Age: 69
End: 2024-04-11
Payer: MEDICARE

## 2024-04-11 VITALS
TEMPERATURE: 97.4 F | WEIGHT: 160 LBS | BODY MASS INDEX: 26.66 KG/M2 | OXYGEN SATURATION: 98 % | HEIGHT: 65 IN | HEART RATE: 90 BPM

## 2024-04-11 DIAGNOSIS — K75.4 AUTOIMMUNE HEPATITIS: ICD-10-CM

## 2024-04-11 DIAGNOSIS — Z79.899 IMMUNODEFICIENCY DUE TO DRUGS: ICD-10-CM

## 2024-04-11 DIAGNOSIS — D84.821 IMMUNODEFICIENCY DUE TO DRUGS: ICD-10-CM

## 2024-04-11 DIAGNOSIS — Z94.4 LIVER TRANSPLANT STATUS: ICD-10-CM

## 2024-04-11 PROCEDURE — 76981 USE PARENCHYMA: CPT | Mod: 26

## 2024-04-11 PROCEDURE — 99214 OFFICE O/P EST MOD 30 MIN: CPT

## 2024-04-12 NOTE — HISTORY OF PRESENT ILLNESS
[de-identified] : Ms. Crisostomo is a 69 yo  F with AIH (diagnosed at age 29) with cirrhosis, s/p DDLT (at [facility] with Dr. Smita Lord in 2004), now with alloimmune hepatitis (with percutaneous liver biopsy on 11/6/18 that showed alloimmune hepatitis with no fibrosis). Her post-transplant medical history is also notable for: morbid obesity, NIDDM2 (diagnosed in 11/2020), osteopenia, history of DVTs (s/p IVC filter placement), chronic Afib (s/p prior cardioversions and ablation, on chronic anticoagulation with Eliquis), combined systolic and diastolic HFrEF (with most recent TTE on 5/5/23 with LVEF 50-55% with regional wall motion abnormalities with hypokinetic inferior and inferoseptal walls and akinetic basal-mid inferior wall, mild-moderate mitral regurgitation, mild-moderate aortic regurgitation, mild aortic stenosis, mild tricuspid regurgitation, and RVSP 36 mmHg consistent with borderline pulmonary hypertension), excisions of squamous cell carcinomas of the skin (right arm 2016 and 2018, right neck 2013, and above the right lip 2018), chronic BLE lymphedema, small hiatal hernia, gastric fundic gland polyps (with focal high-grade vs low-grade dysplasia of resected polyp in 2018), left ankle septic arthritis vs pseudogout (hospitalized 6/21/19-7/10/19), right hand/wrist infection (hospitalized 9/4/19-9/13/19), urosepsis (hospitalized 2/14/20-2/18/20), endometrial adenocarcinoma (stage 1B, grade 2, s/p total vaginal hysterectomy/BSO and cystoscopy 9/8/20, s/p vaginal cuff radiation therapy completed in 11/2020), history of COVID-19 (mild in 12/2021 though with prolonged diarrhea treated with a 14-day course of oral vancomycin for possible C difficile in 1/2022 and then severe in 6/2022 with a prolonged hospital due to associated hypoxemia requiring supplemental O2 s/p dexamethasone, transient delirium, and ADAL), and a right breast papilloma with LCIS (tentatively planned for surgical excision on 4/24/24).  PCP: Dr. Silvia Nash -> Dr. Carmelita Ruiz Cardiology: Dr. Favian Mosley Cardiology (EP): Dr. Doron Byrd ([place]) GI: Dr. Hoa Bangura Gynecology: Dr. Lubna Kennedy Gynecology Oncology: Dr. Rogers Paredes Nephrology: Dr. Demarcus Mauro Endocrinology: Dr. Chivo Nguyen Breast Surgery: Dr. Parisa Hill  FibroScan (4/11/24): Median liver stiffness 9.3 kPa (consistent with F2 fibrosis) and CAP score 162 dB/m (normal, consistent with S0 steatosis).  She was last seen by me on 10/4/23 and is here today for routine follow-up and FibroScan (above). Her current immunosuppression is: prednisone 5 mg po bid and tacrolimus 3.5 mg po q12h, both unchanged since her last visit. She has not had any ER visits or hospitalizations since her last visit with me. She is tentatively scheduled for surgical excision of the right breast papilloma with LCIS with Dr. Hill on 4/24/24. Today, she reports feeling well overall. She was previously on insulin from 8/2023-3/2024 but says she has not been able to transition off insulin as of approximately 1 month ago. She is currently on Ozempic 1 mg subcutaneously weekly (last on 4/5/24), though with a plan to hold it given her upcoming surgery and then to re-start it at 0.25 mg/week and gradually re-increase it again after the surgery. She has lost weight intentionally aided by the Ozempic and has noticed some difficulty eating meals on the higher dose recently, but is transitioning to 6 smaller meals/day to try to avoid muscle wasting and ensure adequate protein intake daily.

## 2024-04-12 NOTE — ASSESSMENT
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 FibroScan today (4/12/24) suggests possible interval progression to moderate (F2) fibrosis but without current concerns for allograft cirrhosis including based on her last imaging (with abdominal sonogram on 9/20/23).  - Most recent labs (4/8/24) with mild elevations in ALP and ALT but overall improved from prior.  - Continue prednisone 5 mg po bid. MMF was previously discontinued due to recurrent infections. - Continue tacrolimus 3.5 mg po q12h, for goal trough level 6 ng/mL (balancing her alloimmune hepatitis with need for increased immunosuppression but with history of recurrent serious infections when over-immunosuppressed). - Ursodiol was discontinued by her in mid-8/2023 due to multiple side effects noted and will not be resumed. - Re-check labs in 9/2024 including with tacrolimus trough level.  # Ventral hernia with mesh: - She has palpable mesh in the epigastrium from prior hernia repair that causes her some discomfort chronically such as with bending over to tie her shoes. She is interested in non-urgent consultation with her transplant surgeon Dr. Smita Lord to consider possible revision in future, and, per her preference, we will try to coordinate that with her next visit with me this fall.  # Obesity and DM2: - She is now on Ozempic and back off insulin and followed by an endocrinologist. Last HbA1c 6.2% (4/8/24), at gaol of <7%. - We have discussed gradual weight loss, exercise as tolerated, and Mediterranean style diet.  # Long-term health maintenance of the liver transplant recipient: - BP: Recently at goal <130/80 mmHg. Continuing metoprolol and follow-up with Dr. Mosley. - Lipids: Most recent lipid panel (8/2023) with LDL at goal of <100 mg/dL and Tg at goal of <250 mg/dL. Re-check annually. - Renal function: Stable on recent labs. - Bone health: Prior DEXA with osteopenia. Continue vitamin D supplementation.  - Dermatology: She was previously advised to use sun protection measures daily (sunscreen, hat, and long sleeves) and to continue q6 month follow-up with her dermatologist for full skin evaluation and given her history of squamous cells cancers. - Cancer screening/surveillance: S/p repeat EGD and colonoscopy on 9/3/21 with her GI Dr. Bangura. Next due for repeat EGD/colonoscopy in 3 years (9/2024) per Dr. Bangura. Advised to continue follow-up with gynecologist oncologist Dr. Sun re: endometrial cancer surveillance. - Vaccinations: Not addressed today. - Other: Ms. MCLEAN was counseled to: abstain from alcohol and all illicit drugs; avoid use of herbal and dietary supplements due to potential hepatotoxicity; limit use of acetaminophen to <2 grams per day; avoid eating any unpasteurized dairy products; avoid eating any raw or undercooked eggs, fish, poultry, or meat; and avoid eating raw/steamed oysters or other shellfish.  Next follow-up: 5-6 months

## 2024-04-12 NOTE — PHYSICAL EXAM
[Non-Tender] : non-tender [Smooth] : smooth [General Appearance - Alert] : alert [General Appearance - In No Acute Distress] : in no acute distress [General Appearance - Well Nourished] : well nourished [General Appearance - Well Developed] : well developed [General Appearance - Well-Appearing] : healthy appearing [Sclera] : the sclera and conjunctiva were normal [Hearing Threshold Finger Rub Not [place]] : hearing was normal [Oropharynx] : the oropharynx was normal [Neck Appearance] : the appearance of the neck was normal [Neck Cervical Mass (___cm)] : no neck mass was observed [Jugular Venous Distention Increased] : there was no jugular-venous distention [Thyroid Diffuse Enlargement] : the thyroid was not enlarged [Thyroid Nodule] : there were no palpable thyroid nodules [Respiration, Rhythm And Depth] : normal respiratory rhythm and effort [Exaggerated Use Of Accessory Muscles For Inspiration] : no accessory muscle use [Auscultation Breath Sounds / Voice Sounds] : lungs were clear to auscultation bilaterally [Heart Sounds] : normal S1 and S2 [Bowel Sounds] : normal bowel sounds [Abdomen Soft] : soft [Abdomen Tenderness] : non-tender [Abdomen Mass (___ Cm)] : no abdominal mass palpated [Nail Clubbing] : no clubbing  or cyanosis of the fingernails [Involuntary Movements] : no involuntary movements were seen [Skin Color & Pigmentation] : normal skin color and pigmentation [Skin Turgor] : normal skin turgor [] : no rash [Oriented To Time, Place, And Person] : oriented to person, place, and time [Impaired Insight] : insight and judgment were intact [Affect] : the affect was normal [Mood] : the mood was normal [Memory Recent] : recent memory was not impaired [Memory Remote] : remote memory was not impaired [Edema] : there was no peripheral edema [Scleral Icterus] : No Scleral Icterus [Spider Angioma] : No spider angioma(s) were observed [Abdominal  Ascites] : no ascites [Splenomegaly] : no splenomegaly [Caput Medusae] : no caput medusae observed [Asterixis] : no asterixis observed [Jaundice] : No jaundice [Palmar Erythema] : no Palmar Erythema [FreeTextEntry1] : +ambulatory with walker

## 2024-04-16 PROBLEM — K75.4 AUTOIMMUNE HEPATITIS: Status: ACTIVE | Noted: 2018-10-04

## 2024-04-19 PROBLEM — D05.01 LOBULAR CARCINOMA IN SITU OF RIGHT BREAST: Chronic | Status: ACTIVE | Noted: 2024-04-08

## 2024-04-19 PROBLEM — E04.9 NONTOXIC GOITER, UNSPECIFIED: Chronic | Status: ACTIVE | Noted: 2024-04-08

## 2024-04-19 PROBLEM — D36.9 BENIGN NEOPLASM, UNSPECIFIED SITE: Chronic | Status: ACTIVE | Noted: 2024-04-08

## 2024-04-19 PROBLEM — Z95.818 PRESENCE OF OTHER CARDIAC IMPLANTS AND GRAFTS: Chronic | Status: ACTIVE | Noted: 2024-04-08

## 2024-04-19 PROBLEM — E11.9 TYPE 2 DIABETES MELLITUS WITHOUT COMPLICATIONS: Chronic | Status: ACTIVE | Noted: 2024-04-08

## 2024-04-21 ENCOUNTER — OUTPATIENT (OUTPATIENT)
Dept: OUTPATIENT SERVICES | Facility: HOSPITAL | Age: 69
LOS: 1 days | End: 2024-04-21
Payer: MEDICARE

## 2024-04-21 ENCOUNTER — RESULT REVIEW (OUTPATIENT)
Age: 69
End: 2024-04-21

## 2024-04-21 DIAGNOSIS — Z98.890 OTHER SPECIFIED POSTPROCEDURAL STATES: Chronic | ICD-10-CM

## 2024-04-21 DIAGNOSIS — Z98.41 CATARACT EXTRACTION STATUS, RIGHT EYE: Chronic | ICD-10-CM

## 2024-04-21 DIAGNOSIS — C54.1 MALIGNANT NEOPLASM OF ENDOMETRIUM: Chronic | ICD-10-CM

## 2024-04-21 DIAGNOSIS — Z87.798 PERSONAL HISTORY OF OTHER (CORRECTED) CONGENITAL MALFORMATIONS: Chronic | ICD-10-CM

## 2024-04-21 DIAGNOSIS — Z95.828 PRESENCE OF OTHER VASCULAR IMPLANTS AND GRAFTS: Chronic | ICD-10-CM

## 2024-04-21 DIAGNOSIS — M11.20 OTHER CHONDROCALCINOSIS, UNSPECIFIED SITE: Chronic | ICD-10-CM

## 2024-04-21 DIAGNOSIS — D05.01 LOBULAR CARCINOMA IN SITU OF RIGHT BREAST: ICD-10-CM

## 2024-04-21 DIAGNOSIS — D24.1 BENIGN NEOPLASM OF RIGHT BREAST: ICD-10-CM

## 2024-04-21 DIAGNOSIS — Z94.4 LIVER TRANSPLANT STATUS: Chronic | ICD-10-CM

## 2024-04-21 PROCEDURE — 88321 CONSLTJ&REPRT SLD PREP ELSWR: CPT

## 2024-04-22 ENCOUNTER — APPOINTMENT (OUTPATIENT)
Dept: ULTRASOUND IMAGING | Facility: CLINIC | Age: 69
End: 2024-04-22
Payer: MEDICARE

## 2024-04-22 ENCOUNTER — OUTPATIENT (OUTPATIENT)
Dept: OUTPATIENT SERVICES | Facility: HOSPITAL | Age: 69
LOS: 1 days | End: 2024-04-22
Payer: MEDICARE

## 2024-04-22 DIAGNOSIS — Z98.890 OTHER SPECIFIED POSTPROCEDURAL STATES: Chronic | ICD-10-CM

## 2024-04-22 DIAGNOSIS — D24.1 BENIGN NEOPLASM OF RIGHT BREAST: ICD-10-CM

## 2024-04-22 DIAGNOSIS — Z98.41 CATARACT EXTRACTION STATUS, RIGHT EYE: Chronic | ICD-10-CM

## 2024-04-22 DIAGNOSIS — Z95.828 PRESENCE OF OTHER VASCULAR IMPLANTS AND GRAFTS: Chronic | ICD-10-CM

## 2024-04-22 DIAGNOSIS — D05.01 LOBULAR CARCINOMA IN SITU OF RIGHT BREAST: ICD-10-CM

## 2024-04-22 DIAGNOSIS — Z94.4 LIVER TRANSPLANT STATUS: Chronic | ICD-10-CM

## 2024-04-22 DIAGNOSIS — C54.1 MALIGNANT NEOPLASM OF ENDOMETRIUM: Chronic | ICD-10-CM

## 2024-04-22 DIAGNOSIS — M11.20 OTHER CHONDROCALCINOSIS, UNSPECIFIED SITE: Chronic | ICD-10-CM

## 2024-04-22 LAB — SURGICAL PATHOLOGY STUDY: SIGNIFICANT CHANGE UP

## 2024-04-22 PROCEDURE — 19285 PERQ DEV BREAST 1ST US IMAG: CPT

## 2024-04-22 PROCEDURE — 19285 PERQ DEV BREAST 1ST US IMAG: CPT | Mod: RT

## 2024-04-22 PROCEDURE — A4648: CPT

## 2024-04-22 NOTE — H&P PST ADULT - PSYCHIATRIC
Chronic low back pain Chronic low back pain Chronic low back pain Chronic low back pain Chronic low back pain Chronic low back pain negative Affect and characteristics of appearance, verbalizations, behaviors are appropriate

## 2024-04-24 ENCOUNTER — NON-APPOINTMENT (OUTPATIENT)
Age: 69
End: 2024-04-24

## 2024-04-24 ENCOUNTER — APPOINTMENT (OUTPATIENT)
Dept: BREAST CENTER | Facility: HOSPITAL | Age: 69
End: 2024-04-24

## 2024-04-24 ENCOUNTER — RESULT REVIEW (OUTPATIENT)
Age: 69
End: 2024-04-24

## 2024-04-24 ENCOUNTER — TRANSCRIPTION ENCOUNTER (OUTPATIENT)
Age: 69
End: 2024-04-24

## 2024-04-24 ENCOUNTER — OUTPATIENT (OUTPATIENT)
Dept: INPATIENT UNIT | Facility: HOSPITAL | Age: 69
LOS: 1 days | Discharge: HOME | End: 2024-04-24
Payer: MEDICARE

## 2024-04-24 VITALS
WEIGHT: 160.06 LBS | RESPIRATION RATE: 16 BRPM | SYSTOLIC BLOOD PRESSURE: 119 MMHG | OXYGEN SATURATION: 99 % | HEART RATE: 95 BPM | TEMPERATURE: 98 F | DIASTOLIC BLOOD PRESSURE: 71 MMHG | HEIGHT: 65 IN

## 2024-04-24 VITALS
RESPIRATION RATE: 17 BRPM | HEART RATE: 94 BPM | OXYGEN SATURATION: 97 % | SYSTOLIC BLOOD PRESSURE: 105 MMHG | DIASTOLIC BLOOD PRESSURE: 72 MMHG

## 2024-04-24 DIAGNOSIS — R11.0 NAUSEA: ICD-10-CM

## 2024-04-24 DIAGNOSIS — Z98.890 OTHER SPECIFIED POSTPROCEDURAL STATES: Chronic | ICD-10-CM

## 2024-04-24 DIAGNOSIS — M11.20 OTHER CHONDROCALCINOSIS, UNSPECIFIED SITE: Chronic | ICD-10-CM

## 2024-04-24 DIAGNOSIS — D24.1 BENIGN NEOPLASM OF RIGHT BREAST: ICD-10-CM

## 2024-04-24 DIAGNOSIS — C54.1 MALIGNANT NEOPLASM OF ENDOMETRIUM: Chronic | ICD-10-CM

## 2024-04-24 DIAGNOSIS — Z98.41 CATARACT EXTRACTION STATUS, RIGHT EYE: Chronic | ICD-10-CM

## 2024-04-24 DIAGNOSIS — Z87.798 PERSONAL HISTORY OF OTHER (CORRECTED) CONGENITAL MALFORMATIONS: Chronic | ICD-10-CM

## 2024-04-24 DIAGNOSIS — D05.01 LOBULAR CARCINOMA IN SITU OF RIGHT BREAST: ICD-10-CM

## 2024-04-24 LAB — GLUCOSE BLDC GLUCOMTR-MCNC: 118 MG/DL — HIGH (ref 70–99)

## 2024-04-24 PROCEDURE — 88307 TISSUE EXAM BY PATHOLOGIST: CPT

## 2024-04-24 PROCEDURE — 88342 IMHCHEM/IMCYTCHM 1ST ANTB: CPT

## 2024-04-24 PROCEDURE — 76098 X-RAY EXAM SURGICAL SPECIMEN: CPT | Mod: 26

## 2024-04-24 PROCEDURE — 76098 X-RAY EXAM SURGICAL SPECIMEN: CPT

## 2024-04-24 PROCEDURE — 19125 EXCISION BREAST LESION: CPT | Mod: RT

## 2024-04-24 PROCEDURE — 82962 GLUCOSE BLOOD TEST: CPT

## 2024-04-24 PROCEDURE — 88342 IMHCHEM/IMCYTCHM 1ST ANTB: CPT | Mod: 26

## 2024-04-24 PROCEDURE — 88307 TISSUE EXAM BY PATHOLOGIST: CPT | Mod: 26

## 2024-04-24 RX ORDER — APIXABAN 2.5 MG/1
1 TABLET, FILM COATED ORAL
Refills: 0 | DISCHARGE

## 2024-04-24 RX ORDER — ONDANSETRON 4 MG/1
4 TABLET, ORALLY DISINTEGRATING ORAL EVERY 6 HOURS
Qty: 5 | Refills: 0 | Status: ACTIVE | COMMUNITY
Start: 2024-04-24 | End: 1900-01-01

## 2024-04-24 RX ORDER — ACETAMINOPHEN AND CODEINE PHOSPHATE 300; 15 MG/1; MG/1
300-15 TABLET ORAL EVERY 6 HOURS
Qty: 5 | Refills: 0 | Status: ACTIVE | COMMUNITY
Start: 2024-04-24 | End: 1900-01-01

## 2024-04-24 RX ORDER — PREDNISOLONE 5 MG
1 TABLET ORAL
Refills: 0 | DISCHARGE

## 2024-04-24 RX ORDER — ONDANSETRON 8 MG/1
4 TABLET, FILM COATED ORAL EVERY 6 HOURS
Refills: 0 | Status: COMPLETED | OUTPATIENT
Start: 2024-04-24 | End: 2024-04-24

## 2024-04-24 RX ORDER — APREPITANT 80 MG/1
40 CAPSULE ORAL ONCE
Refills: 0 | Status: COMPLETED | OUTPATIENT
Start: 2024-04-24 | End: 2024-04-24

## 2024-04-24 RX ORDER — SODIUM CHLORIDE 9 MG/ML
1000 INJECTION, SOLUTION INTRAVENOUS
Refills: 0 | Status: DISCONTINUED | OUTPATIENT
Start: 2024-04-24 | End: 2024-04-24

## 2024-04-24 RX ORDER — FENTANYL CITRATE 50 UG/ML
50 INJECTION INTRAVENOUS
Refills: 0 | Status: DISCONTINUED | OUTPATIENT
Start: 2024-04-24 | End: 2024-04-24

## 2024-04-24 RX ORDER — PROCHLORPERAZINE MALEATE 5 MG
10 TABLET ORAL ONCE
Refills: 0 | Status: COMPLETED | OUTPATIENT
Start: 2024-04-24 | End: 2024-04-24

## 2024-04-24 RX ORDER — OMEPRAZOLE 10 MG/1
1 CAPSULE, DELAYED RELEASE ORAL
Refills: 0 | DISCHARGE

## 2024-04-24 RX ORDER — OXYCODONE HYDROCHLORIDE 5 MG/1
5 TABLET ORAL ONCE
Refills: 0 | Status: DISCONTINUED | OUTPATIENT
Start: 2024-04-24 | End: 2024-04-24

## 2024-04-24 RX ORDER — METOPROLOL TARTRATE 50 MG
1 TABLET ORAL
Refills: 0 | DISCHARGE

## 2024-04-24 RX ORDER — SEMAGLUTIDE 0.68 MG/ML
0 INJECTION, SOLUTION SUBCUTANEOUS
Refills: 0 | DISCHARGE

## 2024-04-24 RX ADMIN — Medication 10 MILLIGRAM(S): at 11:49

## 2024-04-24 RX ADMIN — FENTANYL CITRATE 50 MICROGRAM(S): 50 INJECTION INTRAVENOUS at 10:33

## 2024-04-24 RX ADMIN — ONDANSETRON 4 MILLIGRAM(S): 8 TABLET, FILM COATED ORAL at 10:20

## 2024-04-24 RX ADMIN — APREPITANT 40 MILLIGRAM(S): 80 CAPSULE ORAL at 07:49

## 2024-04-24 RX ADMIN — FENTANYL CITRATE 50 MICROGRAM(S): 50 INJECTION INTRAVENOUS at 10:48

## 2024-04-24 NOTE — ASU PATIENT PROFILE, ADULT - NSCAFFEAMTFREQ_GEN_ALL_CORE_SD
URGENT CARE VISIT NOTE    Chief Complaint   Patient presents with   • Dental Problem     Lower Rt side started last Thursday and over the weekend started to swell up and now this moring was not able to take it anymore         HISTORY     Naveen English is a 64 year old male who presents to urgent care clinic with the above concern. He does not have a dentist.       Medications, Past medical history and Allergies were reviewed in medical record    REVIEW OF SYSTEMS    See history of present illness     PHYSICAL EXAM    Vital Signs:    Vitals:    09/28/21 1208   BP: 136/83   Pulse: 87   Resp: 20   Temp: 97.5 °F (36.4 °C)   TempSrc: Temporal   SpO2: 96%   Weight: 65.8 kg (144 lb 15.2 oz)   Height: 5' 10\" (1.778 m)     General:  Well developed, well-nourished male in no acute distress.    HEENT: Multiple broken/caries. Right lower buccal with purulent drainage.  Extremities: Without deformity, clubbing, or cyanosis of upper or lower extremities.     Neurologic:  Alert, verbal, appropriate.  Oriented x 3.  Speech fluent.  No apraxia or ataxia observed.   Skin: Warm and dry. No rashes    Labs   n/a    Imaging:   n/a    ASSESSMENT & PLAN      1. Dental abscess  - salt water swishes  - amoxicillin-clavulanate (AUGMENTIN) 875-125 MG per tablet; Take 1 tablet by mouth 2 times daily for 10 days.  Dispense: 20 tablet; Refill: 0  - Lidocaine HCl (lidocaine viscous) 2 % solution; Apply to the painful area with a q-tip every 3 hours if needed for pain  Dispense: 15 mL; Refill: 0  - See Patient Instruction section in AVS  - Return if symptoms worsen or fail to improve. See a dentist in 1-2 weeks..            My collaborating physician is Dr. Mayito Khoury DO.   
3-4 cups/cans per day

## 2024-04-24 NOTE — BRIEF OPERATIVE NOTE - NSICDXBRIEFPROCEDURE_GEN_ALL_CORE_FT
PROCEDURES:  Excisional biopsy, breast, with radiologic localization 24-Apr-2024 10:03:52  Parisa Hill

## 2024-04-24 NOTE — BRIEF OPERATIVE NOTE - NSICDXBRIEFPREOP_GEN_ALL_CORE_FT
PRE-OP DIAGNOSIS:  Breast neoplasm, Tis (LCIS), right 24-Apr-2024 10:04:16  Parisa Hill  Papilloma of right breast 24-Apr-2024 10:04:09  Parisa Hill

## 2024-04-24 NOTE — BRIEF OPERATIVE NOTE - NSICDXBRIEFPOSTOP_GEN_ALL_CORE_FT
POST-OP DIAGNOSIS:  Papilloma of right breast 24-Apr-2024 10:04:23  Parisa Hill  Breast neoplasm, Tis (LCIS), right 24-Apr-2024 10:04:20  Parisa Hill

## 2024-04-24 NOTE — ASU PREOP CHECKLIST - INTERNAL PROSTHESES
loop recorder, lens loop recorder, lens, mesh, edwar loop recorder, lens, mesh, edwar, left arm glucose monitoring device

## 2024-04-24 NOTE — ASU DISCHARGE PLAN (ADULT/PEDIATRIC) - CARE PROVIDER_API CALL
Parisa Hill [facility]  Surgery  [address] [place] [id number]  Phone: [phone]  Fax: [phone]  Follow Up Time:

## 2024-04-24 NOTE — ASU PATIENT PROFILE, ADULT - NSICDXPASTMEDICALHX_GEN_ALL_CORE_FT
PAST MEDICAL HISTORY:  Afib     Anemia     Autoimmune hepatitis diagnosed age 29    Bilateral cataracts     Breast mass, right     Colon polyp and duodenal and stomach polyp    Diabetes     DMII (diabetes mellitus, type 2)     DVT (deep venous thrombosis) LLE    Endometrial cancer September 2020    Enlarged thyroid     H/O esophageal varices     History of pseudogout     HTN (hypertension)     Implantable loop recorder present     Intraductal papilloma     Liver disease had liver transplant in 2004    Lobular carcinoma in situ (LCIS) of right breast     Lymphedema of leg ambulates with walkder    Migraines     Pain esophageal banding due to esophageal varices    Scoliosis     Squamous cell skin cancer neck face arms

## 2024-04-24 NOTE — ASU PATIENT PROFILE, ADULT - NSICDXPASTSURGICALHX_GEN_ALL_CORE_FT
Patient (s) given copy of dc instructions and script(s). Patient (s) verbalized understanding of instructions and script (s). Patient given a current medication reconciliation form and verbalized understanding of their medications. Patient (s) verbalized understanding of the importance of discussing medications with  his or her physician or clinic they will be following up with. Patient alert and oriented and in no acute distress. Patient discharged home ambulatory with self. PAST SURGICAL HISTORY:  Endometrial cancer hysterectomy  September 2021    Iza filter in place     H/O ligation of vein     H/O prior ablation treatment 1/2018 and cardioversion    H/O wrist surgery Right ---I& D    History of abdominal surgery     History of bilateral cataract extraction     History of incision and drainage Multiple, various joints--both wrists, left elbow, left shoulder, left knee    History of liver transplant 2004    History of loop recorder prior to 2019    History of thyroglossal duct cyst removal 1962    Pseudogout left shoulder and elbow 2017--irrigation and debridement
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
## 2024-04-25 NOTE — DATA REVIEWED
[FreeTextEntry1] : I have independently reviewed the reports and the images.   B/l mammogram 5/10/23 - scattered fibroglandular density - BIRADS 2  B/l US 5/18/23 - 0.9 cm nodule in R retroareolar; US bx - BIRADS 4  US bx 7/13/23 - R retroareolar, clip = LCIS classic type, sclerosing papilloma  B/l mammogram and US 3/13/24 - scattered fibroglandular density   - bx clip in R central breast with stable asymmetry - 0.8 cm nodule in R retroareolar region, biopsied, stable - BIRADS 2

## 2024-04-25 NOTE — ASSESSMENT
[FreeTextEntry1] : Ms. Crisostomo is a 68 year old woman with a right breast papilloma with LCIS. Her breast exam today is without suspicious findings. She has been cleared by her. She has been cleared by her PCP and Cardiologist. We will proceed with right breast excisional biopsy with Magseed localization.

## 2024-04-25 NOTE — HISTORY OF PRESENT ILLNESS
[FreeTextEntry1] : Ms. Crisostomo is a 68 year old woman who presents for surgical management of a right breast papilloma with LCIS. She is asymptomatic. No palpable breast or axillary lumps, nipple discharge, or skin changes. Her breast surgery in August was previously cancelled due to uncontrolled diabetes with a HbA1C of 10.6. She has since been treated with insulin and ozempic, and the recent HbA1C is 6.0.   Her medical history includes liver transplant on immunosuppressants, frequent UTI's and multiple infections after joint surgeries, and atrial fibrillation on eliquis.  Her family history is significant for breast cancer in a paternal aunt in her late 40's, the paternal grandmother in her 50's, two of her father's cousins (PGF's sister's daughters; one in her 30's), the maternal grandmother in her early 60's, and four maternal great-aunts (MGM's sisters) in their 60's.

## 2024-04-26 ENCOUNTER — NON-APPOINTMENT (OUTPATIENT)
Age: 69
End: 2024-04-26

## 2024-04-29 LAB — SURGICAL PATHOLOGY STUDY: SIGNIFICANT CHANGE UP

## 2024-04-30 DIAGNOSIS — Z87.891 PERSONAL HISTORY OF NICOTINE DEPENDENCE: ICD-10-CM

## 2024-04-30 DIAGNOSIS — I48.91 UNSPECIFIED ATRIAL FIBRILLATION: ICD-10-CM

## 2024-04-30 DIAGNOSIS — N60.01 SOLITARY CYST OF RIGHT BREAST: ICD-10-CM

## 2024-04-30 DIAGNOSIS — D24.1 BENIGN NEOPLASM OF RIGHT BREAST: ICD-10-CM

## 2024-04-30 DIAGNOSIS — I50.42 CHRONIC COMBINED SYSTOLIC (CONGESTIVE) AND DIASTOLIC (CONGESTIVE) HEART FAILURE: ICD-10-CM

## 2024-04-30 DIAGNOSIS — Z86.718 PERSONAL HISTORY OF OTHER VENOUS THROMBOSIS AND EMBOLISM: ICD-10-CM

## 2024-04-30 DIAGNOSIS — Z79.85 LONG-TERM (CURRENT) USE OF INJECTABLE NON-INSULIN ANTIDIABETIC DRUGS: ICD-10-CM

## 2024-04-30 DIAGNOSIS — Z90.722 ACQUIRED ABSENCE OF OVARIES, BILATERAL: ICD-10-CM

## 2024-04-30 DIAGNOSIS — M85.80 OTHER SPECIFIED DISORDERS OF BONE DENSITY AND STRUCTURE, UNSPECIFIED SITE: ICD-10-CM

## 2024-04-30 DIAGNOSIS — N60.81 OTHER BENIGN MAMMARY DYSPLASIAS OF RIGHT BREAST: ICD-10-CM

## 2024-04-30 DIAGNOSIS — N60.31 FIBROSCLEROSIS OF RIGHT BREAST: ICD-10-CM

## 2024-04-30 DIAGNOSIS — Z85.41 PERSONAL HISTORY OF MALIGNANT NEOPLASM OF CERVIX UTERI: ICD-10-CM

## 2024-04-30 DIAGNOSIS — Z88.5 ALLERGY STATUS TO NARCOTIC AGENT: ICD-10-CM

## 2024-04-30 DIAGNOSIS — N62 HYPERTROPHY OF BREAST: ICD-10-CM

## 2024-04-30 DIAGNOSIS — Z79.01 LONG TERM (CURRENT) USE OF ANTICOAGULANTS: ICD-10-CM

## 2024-04-30 DIAGNOSIS — N64.89 OTHER SPECIFIED DISORDERS OF BREAST: ICD-10-CM

## 2024-04-30 DIAGNOSIS — E66.01 MORBID (SEVERE) OBESITY DUE TO EXCESS CALORIES: ICD-10-CM

## 2024-04-30 DIAGNOSIS — Z90.710 ACQUIRED ABSENCE OF BOTH CERVIX AND UTERUS: ICD-10-CM

## 2024-04-30 DIAGNOSIS — D05.01 LOBULAR CARCINOMA IN SITU OF RIGHT BREAST: ICD-10-CM

## 2024-04-30 DIAGNOSIS — E11.9 TYPE 2 DIABETES MELLITUS WITHOUT COMPLICATIONS: ICD-10-CM

## 2024-04-30 DIAGNOSIS — I11.0 HYPERTENSIVE HEART DISEASE WITH HEART FAILURE: ICD-10-CM

## 2024-05-02 ENCOUNTER — APPOINTMENT (OUTPATIENT)
Dept: BREAST CENTER | Facility: CLINIC | Age: 69
End: 2024-05-02
Payer: MEDICARE

## 2024-05-02 VITALS
DIASTOLIC BLOOD PRESSURE: 89 MMHG | SYSTOLIC BLOOD PRESSURE: 139 MMHG | BODY MASS INDEX: 26.66 KG/M2 | WEIGHT: 160 LBS | HEIGHT: 65 IN | HEART RATE: 64 BPM

## 2024-05-02 DIAGNOSIS — D24.1 BENIGN NEOPLASM OF RIGHT BREAST: ICD-10-CM

## 2024-05-02 DIAGNOSIS — Z91.89 OTHER SPECIFIED PERSONAL RISK FACTORS, NOT ELSEWHERE CLASSIFIED: ICD-10-CM

## 2024-05-02 PROCEDURE — 99024 POSTOP FOLLOW-UP VISIT: CPT

## 2024-05-02 NOTE — HISTORY OF PRESENT ILLNESS
[FreeTextEntry1] : Ms. Crisostomo is a 68 year old woman who presents for a postop visit s/p R breast excisional biopsy for a papilloma with LCIS. Postoperatively, she did not use any medication at all for pain. She resumed her eliquis and has some bruising and a lump by the right breast surgical site.  Her family history is significant for breast cancer in a paternal aunt in her late 40's, the paternal grandmother in her 50's, two of her father's cousins (PGF's sister's daughters; one in her 30's), the maternal grandmother in her early 60's, and four maternal great-aunts (MGM's sisters) in their 60's.

## 2024-05-02 NOTE — PHYSICAL EXAM
[Normocephalic] : normocephalic [Atraumatic] : atraumatic [Sclera nonicteric] : sclera nonicteric [Examined in the supine and seated position] : examined in the supine and seated position [Bra Size: ___] : Bra Size: [unfilled] [No dominant masses] : no dominant masses in right breast  [No dominant masses] : no dominant masses left breast [No Nipple Retraction] : no left nipple retraction [No Nipple Discharge] : no left nipple discharge [No Edema] : no edema [No Rashes] : no rashes [No Ulceration] : no ulceration [de-identified] : Superior periareolar incision clean and intact. Surrounding bruising and likely a hematoma at surgical site

## 2024-05-02 NOTE — DATA REVIEWED
[FreeTextEntry1] : I have independently reviewed the reports and the images.   US bx 7/13/23 - R retroareolar, clip = LCIS classic type, sclerosing papilloma  Contrast enhanced mammo 7/26/23 - BIRADS 2   B/l mammogram and US 3/13/24 - scattered fibroglandular density  - 0.8 cm nodule in R breast retroareolar, stable - BIRADS 2   Surgical pathology 4/24/24 - R breast excision 12:00 = LCIS, papilloma, negative for malignancy

## 2024-05-02 NOTE — CONSULT LETTER
[Dear  ___] : Dear  [unfilled], [Courtesy Letter:] : I had the pleasure of seeing your patient, [unfilled], in my office today. [Please see my note below.] : Please see my note below. [Consult Closing:] : Thank you very much for allowing me to participate in the care of this patient.  If you have any questions, please do not hesitate to contact me. [Sincerely,] : Sincerely, [DrFrantz  ___] : Dr. BURNS [FreeTextEntry3] : Parisa Hill MD FACS

## 2024-05-02 NOTE — ASSESSMENT
[FreeTextEntry1] : Ms. Crisostomo is a 68 year old woman s/p R breast excisional biopsy for a papilloma with LCIS. She is recovering well from surgery. Surgical pathology is negative for malignancy. A referral to the Medical Oncologist is provided regarding risk reducing medication. I would like to see her back for a follow-up in 1 year. She understands and is comfortable with the plan. She is encouraged to call if any questions or concerns arise.

## 2024-05-03 PROBLEM — E11.9 TYPE 2 DIABETES MELLITUS WITHOUT COMPLICATIONS: Chronic | Status: ACTIVE | Noted: 2024-04-24

## 2024-05-14 ENCOUNTER — RX RENEWAL (OUTPATIENT)
Age: 69
End: 2024-05-14

## 2024-05-14 ENCOUNTER — APPOINTMENT (OUTPATIENT)
Dept: HEMATOLOGY ONCOLOGY | Facility: CLINIC | Age: 69
End: 2024-05-14
Payer: MEDICARE

## 2024-05-14 VITALS
OXYGEN SATURATION: 96 % | RESPIRATION RATE: 17 BRPM | TEMPERATURE: 98 F | DIASTOLIC BLOOD PRESSURE: 86 MMHG | BODY MASS INDEX: 26.82 KG/M2 | HEIGHT: 65 IN | WEIGHT: 161 LBS | HEART RATE: 102 BPM | SYSTOLIC BLOOD PRESSURE: 132 MMHG

## 2024-05-14 DIAGNOSIS — D05.01 LOBULAR CARCINOMA IN SITU OF RIGHT BREAST: ICD-10-CM

## 2024-05-14 PROCEDURE — 99205 OFFICE O/P NEW HI 60 MIN: CPT

## 2024-05-14 PROCEDURE — G2211 COMPLEX E/M VISIT ADD ON: CPT

## 2024-05-14 RX ORDER — METOPROLOL SUCCINATE 50 MG/1
50 TABLET, EXTENDED RELEASE ORAL
Qty: 180 | Refills: 3 | Status: ACTIVE | COMMUNITY
Start: 2020-12-23 | End: 1900-01-01

## 2024-05-14 NOTE — HISTORY OF PRESENT ILLNESS
[de-identified] : Referred by:   Parisa Hill (breast surgery) PCP: Carmelita Aparicio Diagnosis: right breast LCIS    Camelia Crisostomo is a post-menopausal female who presented at age 69 in May 2024 for evaluation of right breast papilloma with LCIS. The patient has a medical history of uterine cancer, heart failure, poorly controlled DM (on ozempic), liver transplant (in , for autoimmune hepatitis), on immunosuppressants (on tacrolimus), recurrent UTI's, a.fib (on eliquis), vitamin D deficiency, DVT in LLE in  and post-operative joint infections.     Camelia reports that she has a strong family history of breast cancer and therefore undergoes mammogram and 3D MMG yearly. She was found to have a small nodule in the right breast in May 2023. Camelia underwent right breast biopsy on 23 which revealed LCIS, classic type, portions of sclerosing intraductal papilloma w/ calcifications. Her breast surgery was delayed due to hyperglycemia. She ultimately underwent right breast excisional biopsy on 24 with Dr. Parisa Hill and final pathology revealed lobular carcinoma in situ (LCIS) with pagetoid involvement of large ducts, intraductal papilloma.  S/p right breast excision in , benign per patient report.   At today's visit she is generally in her usual state of health. She walks with a walker. She gets up and out of the house every day. She is recovering well from surgery, had some post-op pain and a hematoma, which are improving. She does not like to take pain medications because she has a bad reaction (nausea/vomiting) and likes to avoid additional medications given her history of liver transplant.  She has a poor appetite 2/2 her ozempic. She lost almost 100lb since .   Imaging reviewed: 5/10/2023 - screening mammogram - Birads 2 2023 - Right retroareolar intraductal nodule (Birads 4) 2023 - diagnostic right mammogram - Birads 2 3/13/2024 - bilateral diagnostic mammogram w/ US - Birads 2   Pathology reviewed: 23 - Right breast biopsy (ZP)- LCIS, classic type, portions of sclerosing intraductal papilloma w/ calcifications 24 - Right breast excision - lobular carcinoma in situ (LCIS) with pagetoid involvement of large ducts, intraductal papilloma.     Genetics: [facility] 23- negative (47 gene panel)    HCM: - Colonoscopy: DUE this year in August, follows w/ Dr. Lizarraga  - Gyn: UTD, hx of uterine cancer (S/p hysterectomy in + RT)- did not need chemotherapy, no longer needs pap smears  - Mammo:  - Lung cancer screen: n/a - DEXA: done 2 years ago, osteopenia per report    SH: - Occupation: she retired, she previously worked as a   - Living situation: lives in [place] with her son and daughter in law, and 3 grandkids, would like to get a condo in [place]  - Smoking/etoh/illicits: remote smoker, quit 42 years ago, denies etoh  - Exercise: she walks with a walker   OB/GYN Hx: - Age of menarche: 13 - Age of menopause: had menses until her hysterectomy in   - Pregnancy history:   - Age at live birth: 27 - OCP use: n/a - Fertility treatments: n/a - Hormonal therapy: n/a - Breast feeding history: briefly with all 4 children    FH: - breast cancer in a paternal aunt in her late 40's, the paternal grandmother in her 50's, two of her father's cousins (PGF's sister's daughters; one in her 30's), the maternal grandmother in her early 60's, and four maternal great-aunts (MGM's sisters) in their 60's.

## 2024-05-14 NOTE — CONSULT LETTER
[Dear  ___] : Dear  [unfilled], [Consult Letter:] : I had the pleasure of evaluating your patient, [unfilled]. [Please see my note below.] : Please see my note below. [Consult Closing:] : Thank you very much for allowing me to participate in the care of this patient.  If you have any questions, please do not hesitate to contact me. [Sincerely,] : Sincerely, [FreeTextEntry2] : Dr. Parisa Hill  [FreeTextEntry3] : Dr. Deedee Carballo [DrFrantz  ___] : Dr. BURNS

## 2024-05-14 NOTE — PHYSICAL EXAM
[Ambulatory and capable of all self care but unable to carry out any work activities] : Status 2- Ambulatory and capable of all self care but unable to carry out any work activities. Up and about more than 50% of waking hours [Normal] : affect appropriate [de-identified] : chronically ill appearing female, pleasant, NAD, walks with a walker  [de-identified] : s/p R lumpectomy, healing well, no palpable masses or LAD [de-identified] : large abdominal scar from prior liver transplant, mesh palpable

## 2024-05-14 NOTE — RESULTS/DATA
Last Appointment:  8/25/2021  Future Appointments   Date Time Provider Perico Glynn   12/1/2021  1:45 PM Rigoberto Ruiz  [address] [place] [address]
[FreeTextEntry1] : Right breast LCIS- Diagnosed on screening imaging S/p R breast biopsy 7/13/23 which revealed LCIS, classic type, portions of sclerosing intraductal papilloma w/ calcifications Ultimately underwent R breast excision w/ Dr. Parisa Hill on 4/24/24 - path revealed lobular carcinoma in situ (LCIS) with pagetoid involvement of large ducts, intraductal papilloma.   At today's visit we discussed management of high risk breast disorders including ADH, ALH, LCIS and DCIS. We discussed her risk of breast abnormalities in the future including additional high risk lesions or invasive breast cancer which is about twice the risk of the average population.  Options for risk reduction were reviewed including arimidex, full dose tamoxifen (20mg PO daily x 5 years) vs. low dose tamoxifen (5mg PO daily x 3 years), all of which provide ~50% reduction in future breast events. We discussed that given her history of DVT and uterine cancer, as well as possible medication interactions, she would not be a candidate for tamoxifen.  Would not recommend arimidex for LCIS given risks outweigh benefits, she has baseline osteopenia and is on chronic steroids.  At this time, will monitor her off of antiestrogen therapy. This has been discussed w/ Dr. Hill and Dr. Foster.  She will continue regular breast imaging and follow up with breast surgery. Consider additional breast imaging such as MRI.  Breast exam q 6 months, will stagger my appointments w/ Dr. Hill.  Genetics: [facility] 7/24/23- negative (47 gene panel)  RTC 6 months for follow up.   I personally have spent a total of 60 minutes of time on the date of this encounter reviewing test results, documenting findings, coordinating care and directly consulting with the patient and/or designated family member. Greater than 50% of the face to face encounter time was spent on counseling and/or coordination of care for LCIS.

## 2024-05-28 ENCOUNTER — RX RENEWAL (OUTPATIENT)
Age: 69
End: 2024-05-28

## 2024-05-28 RX ORDER — APIXABAN 5 MG/1
5 TABLET, FILM COATED ORAL
Qty: 180 | Refills: 3 | Status: ACTIVE | COMMUNITY
Start: 2022-04-22 | End: 1900-01-01

## 2024-06-05 RX ORDER — OMEPRAZOLE 40 MG/1
40 CAPSULE, DELAYED RELEASE ORAL
Qty: 90 | Refills: 3 | Status: ACTIVE | COMMUNITY
Start: 2018-02-12 | End: 1900-01-01

## 2024-08-05 ENCOUNTER — RX RENEWAL (OUTPATIENT)
Age: 69
End: 2024-08-05

## 2024-08-20 ENCOUNTER — APPOINTMENT (OUTPATIENT)
Dept: CARDIOLOGY | Facility: CLINIC | Age: 69
End: 2024-08-20

## 2024-08-27 ENCOUNTER — APPOINTMENT (OUTPATIENT)
Dept: INTERNAL MEDICINE | Facility: CLINIC | Age: 69
End: 2024-08-27
Payer: MEDICARE

## 2024-08-27 VITALS
SYSTOLIC BLOOD PRESSURE: 110 MMHG | DIASTOLIC BLOOD PRESSURE: 70 MMHG | TEMPERATURE: 97.6 F | WEIGHT: 171 LBS | BODY MASS INDEX: 28.46 KG/M2 | HEART RATE: 115 BPM | OXYGEN SATURATION: 96 %

## 2024-08-27 DIAGNOSIS — R79.89 OTHER SPECIFIED ABNORMAL FINDINGS OF BLOOD CHEMISTRY: ICD-10-CM

## 2024-08-27 DIAGNOSIS — E11.9 TYPE 2 DIABETES MELLITUS W/OUT COMPLICATIONS: ICD-10-CM

## 2024-08-27 DIAGNOSIS — L02.91 CUTANEOUS ABSCESS, UNSPECIFIED: ICD-10-CM

## 2024-08-27 PROCEDURE — G2211 COMPLEX E/M VISIT ADD ON: CPT

## 2024-08-27 PROCEDURE — 99214 OFFICE O/P EST MOD 30 MIN: CPT

## 2024-08-27 RX ORDER — SULFAMETHOXAZOLE AND TRIMETHOPRIM 800; 160 MG/1; MG/1
800-160 TABLET ORAL TWICE DAILY
Qty: 20 | Refills: 0 | Status: ACTIVE | COMMUNITY
Start: 2024-08-27 | End: 1900-01-01

## 2024-08-27 NOTE — HISTORY OF PRESENT ILLNESS
[FreeTextEntry8] : Pt  with a cyst under the right armpit states was treated 2 weeks ago finished antibiotic , still present states drained and returned - painfull no fever

## 2024-08-27 NOTE — PHYSICAL EXAM
[No Acute Distress] : no acute distress [Well Nourished] : well nourished [Well Developed] : well developed [Well-Appearing] : well-appearing [Normal Voice/Communication] : normal voice/communication [Normal Sclera/Conjunctiva] : normal sclera/conjunctiva [PERRL] : pupils equal round and reactive to light [Normal Outer Ear/Nose] : the outer ears and nose were normal in appearance [No JVD] : no jugular venous distention [No Respiratory Distress] : no respiratory distress  [No Accessory Muscle Use] : no accessory muscle use [Clear to Auscultation] : lungs were clear to auscultation bilaterally [Normal Rate] : normal rate  [Regular Rhythm] : with a regular rhythm [Normal S1, S2] : normal S1 and S2 [No Murmur] : no murmur heard [Soft] : abdomen soft [Non Tender] : non-tender [Non-distended] : non-distended [No Masses] : no abdominal mass palpated [No HSM] : no HSM [Normal Bowel Sounds] : normal bowel sounds [Grossly Normal Strength/Tone] : grossly normal strength/tone [Coordination Grossly Intact] : coordination grossly intact [No Focal Deficits] : no focal deficits [Speech Grossly Normal] : speech grossly normal [Normal Affect] : the affect was normal [Alert and Oriented x3] : oriented to person, place, and time [Normal Mood] : the mood was normal [Normal Insight/Judgement] : insight and judgment were intact [de-identified] : right axilla with enlarged cystic appearing lesion, area with erythema, warmth and indurated and tender, - pustule like in appearance  [de-identified] : walks with walker

## 2024-08-27 NOTE — PHYSICAL EXAM
[No Acute Distress] : no acute distress [Well Nourished] : well nourished [Well Developed] : well developed [Well-Appearing] : well-appearing [Normal Voice/Communication] : normal voice/communication [Normal Sclera/Conjunctiva] : normal sclera/conjunctiva [PERRL] : pupils equal round and reactive to light [Normal Outer Ear/Nose] : the outer ears and nose were normal in appearance [No JVD] : no jugular venous distention [No Respiratory Distress] : no respiratory distress  [No Accessory Muscle Use] : no accessory muscle use [Clear to Auscultation] : lungs were clear to auscultation bilaterally [Normal Rate] : normal rate  [Regular Rhythm] : with a regular rhythm [Normal S1, S2] : normal S1 and S2 [No Murmur] : no murmur heard [Soft] : abdomen soft [Non Tender] : non-tender [Non-distended] : non-distended [No Masses] : no abdominal mass palpated [No HSM] : no HSM [Normal Bowel Sounds] : normal bowel sounds [Grossly Normal Strength/Tone] : grossly normal strength/tone [Coordination Grossly Intact] : coordination grossly intact [No Focal Deficits] : no focal deficits [Speech Grossly Normal] : speech grossly normal [Normal Affect] : the affect was normal [Alert and Oriented x3] : oriented to person, place, and time [Normal Mood] : the mood was normal [Normal Insight/Judgement] : insight and judgment were intact [de-identified] : right axilla with enlarged cystic appearing lesion, area with erythema, warmth and indurated and tender, - pustule like in appearance  [de-identified] : walks with walker

## 2024-08-27 NOTE — ASSESSMENT
[FreeTextEntry1] : start bactrim   see surgery for consultaion given recurrent infection  clear atibiotic with liver transplant team prior to starting  followup labs

## 2024-09-10 ENCOUNTER — APPOINTMENT (OUTPATIENT)
Dept: HEPATOLOGY | Facility: CLINIC | Age: 69
End: 2024-09-10
Payer: MEDICARE

## 2024-09-10 ENCOUNTER — APPOINTMENT (OUTPATIENT)
Dept: TRANSPLANT | Facility: CLINIC | Age: 69
End: 2024-09-10
Payer: MEDICARE

## 2024-09-10 VITALS
WEIGHT: 170 LBS | OXYGEN SATURATION: 97 % | DIASTOLIC BLOOD PRESSURE: 87 MMHG | HEART RATE: 100 BPM | BODY MASS INDEX: 28.32 KG/M2 | RESPIRATION RATE: 16 BRPM | SYSTOLIC BLOOD PRESSURE: 122 MMHG | HEIGHT: 65 IN

## 2024-09-10 DIAGNOSIS — Z94.4 LIVER TRANSPLANT STATUS: ICD-10-CM

## 2024-09-10 PROCEDURE — G2211 COMPLEX E/M VISIT ADD ON: CPT

## 2024-09-10 PROCEDURE — 99214 OFFICE O/P EST MOD 30 MIN: CPT

## 2024-09-10 PROCEDURE — 99213 OFFICE O/P EST LOW 20 MIN: CPT

## 2024-09-10 NOTE — REVIEW OF SYSTEMS
[As Noted in HPI] : as noted in HPI [Negative] : Integumentary [de-identified] : + R axilla cysts

## 2024-09-10 NOTE — PHYSICAL EXAM
[Non-Tender] : non-tender [Smooth] : smooth [General Appearance - Alert] : alert [General Appearance - In No Acute Distress] : in no acute distress [General Appearance - Well Nourished] : well nourished [General Appearance - Well Developed] : well developed [General Appearance - Well-Appearing] : healthy appearing [Sclera] : the sclera and conjunctiva were normal [Hearing Threshold Finger Rub Not Blaine] : hearing was normal [Oropharynx] : the oropharynx was normal [Neck Appearance] : the appearance of the neck was normal [Neck Cervical Mass (___cm)] : no neck mass was observed [Jugular Venous Distention Increased] : there was no jugular-venous distention [Thyroid Diffuse Enlargement] : the thyroid was not enlarged [Thyroid Nodule] : there were no palpable thyroid nodules [Respiration, Rhythm And Depth] : normal respiratory rhythm and effort [Exaggerated Use Of Accessory Muscles For Inspiration] : no accessory muscle use [Auscultation Breath Sounds / Voice Sounds] : lungs were clear to auscultation bilaterally [Heart Sounds] : normal S1 and S2 [Bowel Sounds] : normal bowel sounds [Abdomen Soft] : soft [Abdomen Tenderness] : non-tender [] : no hepato-splenomegaly [Abdomen Mass (___ Cm)] : no abdominal mass palpated [Nail Clubbing] : no clubbing  or cyanosis of the fingernails [Involuntary Movements] : no involuntary movements were seen [Skin Color & Pigmentation] : normal skin color and pigmentation [Skin Turgor] : normal skin turgor [Oriented To Time, Place, And Person] : oriented to person, place, and time [Impaired Insight] : insight and judgment were intact [Affect] : the affect was normal [Mood] : the mood was normal [Memory Recent] : recent memory was not impaired [Memory Remote] : remote memory was not impaired [Scleral Icterus] : No Scleral Icterus [Spider Angioma] : No spider angioma(s) were observed [Abdominal  Ascites] : no ascites [Splenomegaly] : no splenomegaly [Caput Medusae] : no caput medusae observed [Asterixis] : no asterixis observed [Jaundice] : No jaundice [Palmar Erythema] : no Palmar Erythema [FreeTextEntry1] : + 5 tender erythematous/hyperpigmented small nodules to R axilla

## 2024-09-10 NOTE — HISTORY OF PRESENT ILLNESS
[FreeTextEntry1] : 69 year old female with AIH , post  donor liver transplant in  ( [facility]) doing well, follows with Dr Foster DM, DVTs post IVC filter, chronic A fib, HFrEF, endometrial adenocarcinoma post hysterectomy 2020,  had repair of incisional hernia at the center part of the transplant incision with mesh, few years after her transplant referred today because of pain and discomfort at site of hernia repair lost weight doing well eating and ambulating regular bowel movements complains of discomfort at site of hernia and feels the mesh under the skin . patient is asking if we can remove the mesh  O/E looks generally well lungs clear abdomen soft transplant incision healed well the center part of the transplant incision; the mesh is palpable right under the skin and feels very firm/ fibrotic almost calcified, no hernia recurrence  will get CT abdomen non contrast then discuss risks and benefits of exploration, removing the mesh and performing another repair

## 2024-09-10 NOTE — ASSESSMENT
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 Her recent FibroScan (4/12/24) suggests possible interval progression to moderate (F2) fibrosis but without current concerns for allograft cirrhosis including based on her last imaging (with abdominal sonogram on 9/20/23).  - Labs ordered today to evaluate liver function. If stable, we will monitor labs every 3 months. - MMF was previously discontinued due to recurrent infections. - Continue prednisone 5 mg po bid.  - Continue tacrolimus 3.5 mg po q12h, for goal trough level 6 ng/mL (balancing her alloimmune hepatitis with need for increased immunosuppression but with history of recurrent serious infections when over-immunosuppressed). - Based on pending lab results, will adjust immunosuppression accordingly.  - Ursodiol was discontinued by her in mid-8/2023 due to multiple side effects noted.  # Ventral hernia with mesh: - She has palpable mesh in the epigastrium from prior hernia repair that causes her some discomfort chronically such as with bending over to tie her shoes.  - Per Dr. Lord's recommendation, will plan for CT of the abdomen and pelvis non contrast (ordered today) and depending on the results, they will discuss risks and benefits of removing the mesh and preforming a repair.    # Right axilla cysts, likely hidradenitis suppurativa: - She is currently on a 10-day course of full dose Bactrim DS 1 tab po bid. She is pending surgical consultation with Dr. Leung, who she was referred to by her PCP. She was advised that if dermatology advises to manage her medically rather than surgically remove the cysts, long term use of Bactrim (such as if prophylactic Bactrim SS 1 tab po daily is helpful) is OK from a liver standpoint.   # Obesity and DM2: - She has had an overall an intentional ~ 70 lbs. weight loss from 2673-3704. She is now only on Ozempic 0.25mg/weekly and followed by an endocrinologist. Last HbA1c 6.2% (4/8/24), at goal of <7%. Re-check with next labs.  - We have discussed gradual weight loss, exercise as tolerated, and Mediterranean style diet.  # Long-term health maintenance of the liver transplant recipient: - BP: At goal <130/80 mmHg. Continuing metoprolol and follow-up with Dr. Mosley. - Lipids: Most recent lipid panel (8/2023) with LDL at goal of <100 mg/dL and Tg at goal of <250 mg/dL. Re-check annually, ordered with next labs. - Renal function: Stable on recent labs. - Bone health: Prior DEXA with osteopenia. Continue vitamin D supplementation.  - Dermatology: She was advised to use sun protection measures daily (sunscreen, hat, and long sleeves) and to continue q6 month follow-up with her dermatologist for full skin evaluation and given her history of squamous cells cancers. - Cancer screening/surveillance: S/p repeat EGD and colonoscopy on 9/3/21 with her GI Dr. Bangura. She is due now for repeat EGD/colonoscopy per Dr. Bangura, pending scheduling. Advised to continue follow-up with gynecologist oncologist Dr. Sun re: endometrial cancer surveillance. - Vaccinations: S/p Shingrix and pneumococcal vaccine. She was advised to get this year's flu vaccine, RSV and COVID-19 booster.  - Other: Ms. MCLEAN was counseled to: abstain from alcohol and all illicit drugs; avoid use of herbal and dietary supplements due to potential hepatotoxicity; limit use of acetaminophen to <2 grams per day; avoid eating any unpasteurized dairy products; avoid eating any raw or undercooked eggs, fish, poultry, or meat; and avoid eating raw/steamed oysters or other shellfish.  Next follow-up: 2 months

## 2024-09-10 NOTE — HISTORY OF PRESENT ILLNESS
[de-identified] : Ms. Crisostomo is a 70 yo  F with AIH (diagnosed at age 29) with cirrhosis, s/p DDLT (at [facility] with Dr. Smita Lord in 2004), now with alloimmune hepatitis (with percutaneous liver biopsy on 11/6/18 that showed alloimmune hepatitis with no fibrosis). Her post-transplant medical history is also notable for: morbid obesity (s/p ~ 70 lbs intentional weight loss in 1421-8420), NIDDM2 (diagnosed in 11/2020), osteopenia, history of DVTs (s/p IVC filter placement), chronic Afib (s/p prior cardioversions and ablation, on chronic anticoagulation with Eliquis), combined systolic and diastolic HFrEF (with most recent TTE on 5/5/23 with LVEF 50-55% with regional wall motion abnormalities with hypokinetic inferior and inferoseptal walls and akinetic basal-mid inferior wall, mild-moderate mitral regurgitation, mild-moderate aortic regurgitation, mild aortic stenosis, mild tricuspid regurgitation, and RVSP 36 mmHg consistent with borderline pulmonary hypertension), excisions of squamous cell carcinomas of the skin (right arm 2016 and 2018, right neck 2013, and above the right lip 2018), chronic BLE lymphedema, small hiatal hernia, gastric fundic gland polyps (with focal high-grade vs low-grade dysplasia of resected polyp in 2018), left ankle septic arthritis vs pseudogout (hospitalized 6/21/19-7/10/19), right hand/wrist infection (hospitalized 9/4/19-9/13/19), urosepsis (hospitalized 2/14/20-2/18/20), endometrial adenocarcinoma (stage 1B, grade 2, s/p total vaginal hysterectomy/BSO and cystoscopy 9/8/20, s/p vaginal cuff radiation therapy completed in 11/2020), history of COVID-19 (mild in 12/2021 though with prolonged diarrhea treated with a 14-day course of oral vancomycin for possible C difficile in 1/2022 and then severe in 6/2022 with a prolonged hospital due to associated hypoxemia requiring supplemental O2 s/p dexamethasone, transient delirium, and ADAL), and a right breast papilloma with LCIS, s/p excision on 4/24/24.  PCP: Dr. Silvia Nash -> Dr. Carmelita Ruiz Cardiology: Dr. Favian Mosley Cardiology (EP): Dr. Doron Byrd ([place]) GI: Dr. Hoa Bangura Gynecology: Dr. Lubna Kennedy Gynecology Oncology: Dr. Rogers Paredes Nephrology: Dr. Demarcus Mauro Endocrinology: Dr. Chivo Nguyen Breast Surgery: Dr. Parisa iHll  FibroScan (4/11/24): Median liver stiffness 9.3 kPa (consistent with F2 fibrosis) and CAP score 162 dB/m (normal, consistent with S0 steatosis).  She was last seen in this office on 4/11/24 and presents today for routine follow up. Her current immunosuppression: tacrolimus 3.5 mg po q12h and prednisone 5 mg po daily. She has since had surgical excision of a right breast papilloma on 4/2024. About a month after the procedure, she developed multiple painful cysts to the right axilla. She was evaluated by dermatology and was placed on full dose Bactrim, which she is currently on a 3rd cycle of, and is pending surgical consultation with Dr. Иван Leung, who she was referred to by her PCP. She is scheduled to see transplant surgeon, Dr. Lord, today for a non-urgent consultation of possible ventral hernia revision.

## 2025-05-09 ENCOUNTER — OFFICE (OUTPATIENT)
Facility: LOCATION | Age: 70
Setting detail: OPHTHALMOLOGY
End: 2025-05-09
Payer: MEDICARE

## 2025-05-09 DIAGNOSIS — H26.493: ICD-10-CM

## 2025-05-09 DIAGNOSIS — E11.3293: ICD-10-CM

## 2025-05-09 DIAGNOSIS — H16.223: ICD-10-CM

## 2025-05-09 PROCEDURE — 92014 COMPRE OPH EXAM EST PT 1/>: CPT | Performed by: OPHTHALMOLOGY

## 2025-05-09 PROCEDURE — 92250 FUNDUS PHOTOGRAPHY W/I&R: CPT | Performed by: OPHTHALMOLOGY

## 2025-05-09 ASSESSMENT — LID EXAM ASSESSMENTS
OD_BLEPHARITIS: RLL RUL
OS_BLEPHARITIS: LLL LUL

## 2025-05-09 ASSESSMENT — REFRACTION_AUTOREFRACTION
OS_CYLINDER: 0.00
OD_CYLINDER: +0.50
OS_SPHERE: -0.25
OD_AXIS: 026
OD_SPHERE: 0.00

## 2025-05-09 ASSESSMENT — KERATOMETRY
OD_K1POWER_DIOPTERS: 44.00
OD_K2POWER_DIOPTERS: 44.25
OS_K2POWER_DIOPTERS: 44.75
OS_K1POWER_DIOPTERS: 44.50
OD_AXISANGLE_DEGREES: 041
OS_AXISANGLE_DEGREES: 077

## 2025-05-09 ASSESSMENT — CONFRONTATIONAL VISUAL FIELD TEST (CVF)
OD_FINDINGS: FULL
OS_FINDINGS: FULL

## 2025-05-09 ASSESSMENT — SUPERFICIAL PUNCTATE KERATITIS (SPK)
OD_SPK: T
OS_SPK: T

## 2025-05-09 ASSESSMENT — TONOMETRY
OS_IOP_MMHG: 16
OD_IOP_MMHG: 16

## 2025-05-09 ASSESSMENT — VISUAL ACUITY
OS_BCVA: 20/20-1
OD_BCVA: 20/20-1